# Patient Record
Sex: FEMALE | Race: WHITE | NOT HISPANIC OR LATINO | Employment: OTHER
[De-identification: names, ages, dates, MRNs, and addresses within clinical notes are randomized per-mention and may not be internally consistent; named-entity substitution may affect disease eponyms.]

---

## 2017-02-21 ENCOUNTER — PRIOR ORIGINAL RECORDS (OUTPATIENT)
Dept: OTHER | Age: 82
End: 2017-02-21

## 2017-03-01 ENCOUNTER — PRIOR ORIGINAL RECORDS (OUTPATIENT)
Dept: OTHER | Age: 82
End: 2017-03-01

## 2017-03-01 ENCOUNTER — LAB REQUISITION (OUTPATIENT)
Dept: LAB | Facility: HOSPITAL | Age: 82
End: 2017-03-01
Attending: FAMILY MEDICINE
Payer: MEDICARE

## 2017-03-01 DIAGNOSIS — I25.10 ATHEROSCLEROTIC HEART DISEASE OF NATIVE CORONARY ARTERY WITHOUT ANGINA PECTORIS: ICD-10-CM

## 2017-03-01 DIAGNOSIS — I48.91 ATRIAL FIBRILLATION (HCC): ICD-10-CM

## 2017-03-01 LAB
ALBUMIN SERPL-MCNC: 3.5 G/DL (ref 3.5–4.8)
ALP LIVER SERPL-CCNC: 55 U/L (ref 55–142)
ALT SERPL-CCNC: 24 U/L (ref 14–54)
AST SERPL-CCNC: 25 U/L (ref 15–41)
BILIRUB SERPL-MCNC: 0.8 MG/DL (ref 0.1–2)
BUN BLD-MCNC: 28 MG/DL (ref 8–20)
CALCIUM BLD-MCNC: 9.1 MG/DL (ref 8.3–10.3)
CHLORIDE: 106 MMOL/L (ref 101–111)
CHOLEST SMN-MCNC: 238 MG/DL (ref ?–200)
CO2: 29 MMOL/L (ref 22–32)
CREAT BLD-MCNC: 1.19 MG/DL (ref 0.55–1.02)
GLUCOSE BLD-MCNC: 78 MG/DL (ref 70–99)
HDLC SERPL-MCNC: 88 MG/DL (ref 45–?)
HDLC SERPL: 2.7 {RATIO} (ref ?–4.44)
LDLC SERPL CALC-MCNC: 134 MG/DL (ref ?–130)
M PROTEIN MFR SERPL ELPH: 6.7 G/DL (ref 6.1–8.3)
NONHDLC SERPL-MCNC: 150 MG/DL (ref ?–130)
POTASSIUM SERPL-SCNC: 3.9 MMOL/L (ref 3.6–5.1)
SODIUM SERPL-SCNC: 142 MMOL/L (ref 136–144)
T3 SERPL-MCNC: 51 NG/DL (ref 60–181)
THYROXINE (T4): 7.1 UG/DL (ref 4.5–10.9)
TRIGLYCERIDES: 78 MG/DL (ref ?–150)
TSI SER-ACNC: 1.67 MIU/ML (ref 0.35–5.5)
VLDL: 16 MG/DL (ref 5–40)

## 2017-03-01 PROCEDURE — 84443 ASSAY THYROID STIM HORMONE: CPT | Performed by: FAMILY MEDICINE

## 2017-03-01 PROCEDURE — 80061 LIPID PANEL: CPT | Performed by: FAMILY MEDICINE

## 2017-03-01 PROCEDURE — 84436 ASSAY OF TOTAL THYROXINE: CPT | Performed by: FAMILY MEDICINE

## 2017-03-01 PROCEDURE — 84480 ASSAY TRIIODOTHYRONINE (T3): CPT | Performed by: FAMILY MEDICINE

## 2017-03-01 PROCEDURE — 36415 COLL VENOUS BLD VENIPUNCTURE: CPT | Performed by: FAMILY MEDICINE

## 2017-03-01 PROCEDURE — 80053 COMPREHEN METABOLIC PANEL: CPT | Performed by: FAMILY MEDICINE

## 2017-03-17 LAB
ALBUMIN: 3.5 G/DL
ALT (SGPT): 24 U/L
AST (SGOT): 25 U/L
BILIRUBIN TOTAL: 0.8 MG/DL
BUN: 28 MG/DL
CALCIUM: 9.1 MG/DL
CHLORIDE: 106 MEQ/L
CHOLESTEROL, TOTAL: 238 MG/DL
CREATININE, SERUM: 1.19 MG/DL
GLUCOSE: 78 MG/DL
GLUCOSE: 78 MG/DL
HDL CHOLESTEROL: 88 MG/DL
LDL CHOLESTEROL: 134 MG/DL
POTASSIUM, SERUM: 3.9 MEQ/L
PROTEIN, TOTAL: 6.7 G/DL
SGOT (AST): 25 IU/L
SGPT (ALT): 24 IU/L
SODIUM: 142 MEQ/L
TRIGLYCERIDES: 78 MG/DL

## 2017-03-20 ENCOUNTER — PRIOR ORIGINAL RECORDS (OUTPATIENT)
Dept: OTHER | Age: 82
End: 2017-03-20

## 2017-03-21 ENCOUNTER — PRIOR ORIGINAL RECORDS (OUTPATIENT)
Dept: OTHER | Age: 82
End: 2017-03-21

## 2017-03-24 ENCOUNTER — PRIOR ORIGINAL RECORDS (OUTPATIENT)
Dept: OTHER | Age: 82
End: 2017-03-24

## 2017-03-27 ENCOUNTER — PRIOR ORIGINAL RECORDS (OUTPATIENT)
Dept: OTHER | Age: 82
End: 2017-03-27

## 2017-06-07 ENCOUNTER — LAB ENCOUNTER (OUTPATIENT)
Dept: LAB | Age: 82
End: 2017-06-07
Attending: FAMILY MEDICINE
Payer: MEDICARE

## 2017-06-07 DIAGNOSIS — I25.10 CAD (CORONARY ARTERY DISEASE): Primary | ICD-10-CM

## 2017-06-07 PROCEDURE — 36415 COLL VENOUS BLD VENIPUNCTURE: CPT

## 2017-06-07 PROCEDURE — 80048 BASIC METABOLIC PNL TOTAL CA: CPT

## 2017-06-07 PROCEDURE — 83880 ASSAY OF NATRIURETIC PEPTIDE: CPT

## 2017-07-12 ENCOUNTER — LAB ENCOUNTER (OUTPATIENT)
Dept: LAB | Age: 82
End: 2017-07-12
Attending: FAMILY MEDICINE
Payer: MEDICARE

## 2017-07-12 DIAGNOSIS — R69 DIAGNOSIS UNKNOWN: Primary | ICD-10-CM

## 2017-07-12 LAB
ALBUMIN SERPL-MCNC: 3.3 G/DL (ref 3.5–4.8)
ALP LIVER SERPL-CCNC: 55 U/L (ref 55–142)
ALT SERPL-CCNC: 21 U/L (ref 14–54)
AST SERPL-CCNC: 27 U/L (ref 15–41)
BASOPHILS # BLD AUTO: 0.05 X10(3) UL (ref 0–0.1)
BASOPHILS NFR BLD AUTO: 1.3 %
BILIRUB SERPL-MCNC: 0.7 MG/DL (ref 0.1–2)
BUN BLD-MCNC: 27 MG/DL (ref 8–20)
CALCIUM BLD-MCNC: 9.1 MG/DL (ref 8.3–10.3)
CHLORIDE: 106 MMOL/L (ref 101–111)
CO2: 33 MMOL/L (ref 22–32)
CREAT BLD-MCNC: 1.17 MG/DL (ref 0.55–1.02)
EOSINOPHIL # BLD AUTO: 0.12 X10(3) UL (ref 0–0.3)
EOSINOPHIL NFR BLD AUTO: 3.1 %
ERYTHROCYTE [DISTWIDTH] IN BLOOD BY AUTOMATED COUNT: 15 % (ref 11.5–16)
GLUCOSE BLD-MCNC: 78 MG/DL (ref 70–99)
HCT VFR BLD AUTO: 39.6 % (ref 34–50)
HGB BLD-MCNC: 12.6 G/DL (ref 12–16)
IMMATURE GRANULOCYTE COUNT: 0.01 X10(3) UL (ref 0–1)
IMMATURE GRANULOCYTE RATIO %: 0.3 %
LYMPHOCYTES # BLD AUTO: 1.13 X10(3) UL (ref 0.9–4)
LYMPHOCYTES NFR BLD AUTO: 29.6 %
M PROTEIN MFR SERPL ELPH: 6.6 G/DL (ref 6.1–8.3)
MCH RBC QN AUTO: 30.5 PG (ref 27–33.2)
MCHC RBC AUTO-ENTMCNC: 31.8 G/DL (ref 31–37)
MCV RBC AUTO: 95.9 FL (ref 81–100)
MONOCYTES # BLD AUTO: 0.6 X10(3) UL (ref 0.1–0.6)
MONOCYTES NFR BLD AUTO: 15.7 %
NEUTROPHIL ABS PRELIM: 1.91 X10 (3) UL (ref 1.3–6.7)
NEUTROPHILS # BLD AUTO: 1.91 X10(3) UL (ref 1.3–6.7)
NEUTROPHILS NFR BLD AUTO: 50 %
PLATELET # BLD AUTO: 106 10(3)UL (ref 150–450)
POTASSIUM SERPL-SCNC: 4.2 MMOL/L (ref 3.6–5.1)
RBC # BLD AUTO: 4.13 X10(6)UL (ref 3.8–5.1)
RED CELL DISTRIBUTION WIDTH-SD: 52.4 FL (ref 35.1–46.3)
SODIUM SERPL-SCNC: 143 MMOL/L (ref 136–144)
WBC # BLD AUTO: 3.8 X10(3) UL (ref 4–13)

## 2017-07-12 PROCEDURE — 80053 COMPREHEN METABOLIC PANEL: CPT

## 2017-07-12 PROCEDURE — 36415 COLL VENOUS BLD VENIPUNCTURE: CPT

## 2017-07-12 PROCEDURE — 85025 COMPLETE CBC W/AUTO DIFF WBC: CPT

## 2017-08-14 ENCOUNTER — PRIOR ORIGINAL RECORDS (OUTPATIENT)
Dept: OTHER | Age: 82
End: 2017-08-14

## 2017-08-22 ENCOUNTER — MYAURORA ACCOUNT LINK (OUTPATIENT)
Dept: OTHER | Age: 82
End: 2017-08-22

## 2017-08-22 ENCOUNTER — PRIOR ORIGINAL RECORDS (OUTPATIENT)
Dept: OTHER | Age: 82
End: 2017-08-22

## 2017-08-24 ENCOUNTER — PRIOR ORIGINAL RECORDS (OUTPATIENT)
Dept: OTHER | Age: 82
End: 2017-08-24

## 2017-09-05 ENCOUNTER — PRIOR ORIGINAL RECORDS (OUTPATIENT)
Dept: OTHER | Age: 82
End: 2017-09-05

## 2017-10-04 ENCOUNTER — LAB ENCOUNTER (OUTPATIENT)
Dept: LAB | Age: 82
End: 2017-10-04
Attending: INTERNAL MEDICINE
Payer: MEDICARE

## 2017-10-04 DIAGNOSIS — E78.00 PURE HYPERCHOLESTEROLEMIA: Primary | ICD-10-CM

## 2017-10-04 PROCEDURE — 85025 COMPLETE CBC W/AUTO DIFF WBC: CPT

## 2017-10-04 PROCEDURE — 84436 ASSAY OF TOTAL THYROXINE: CPT

## 2017-10-04 PROCEDURE — 84443 ASSAY THYROID STIM HORMONE: CPT

## 2017-10-04 PROCEDURE — 36415 COLL VENOUS BLD VENIPUNCTURE: CPT

## 2017-10-04 PROCEDURE — 80053 COMPREHEN METABOLIC PANEL: CPT

## 2017-10-04 PROCEDURE — 84480 ASSAY TRIIODOTHYRONINE (T3): CPT

## 2017-10-04 PROCEDURE — 80061 LIPID PANEL: CPT

## 2017-11-07 ENCOUNTER — PRIOR ORIGINAL RECORDS (OUTPATIENT)
Dept: OTHER | Age: 82
End: 2017-11-07

## 2017-11-14 ENCOUNTER — PRIOR ORIGINAL RECORDS (OUTPATIENT)
Dept: OTHER | Age: 82
End: 2017-11-14

## 2017-11-22 ENCOUNTER — PRIOR ORIGINAL RECORDS (OUTPATIENT)
Dept: OTHER | Age: 82
End: 2017-11-22

## 2017-12-01 ENCOUNTER — PRIOR ORIGINAL RECORDS (OUTPATIENT)
Dept: OTHER | Age: 82
End: 2017-12-01

## 2018-01-10 ENCOUNTER — NURSE ONLY (OUTPATIENT)
Dept: LAB | Age: 83
End: 2018-01-10
Attending: FAMILY MEDICINE
Payer: MEDICARE

## 2018-01-10 DIAGNOSIS — N18.30 CHRONIC KIDNEY DISEASE, STAGE III (MODERATE) (HCC): Primary | ICD-10-CM

## 2018-01-10 LAB
ALBUMIN SERPL-MCNC: 3.6 G/DL (ref 3.5–4.8)
ALP LIVER SERPL-CCNC: 68 U/L (ref 55–142)
ALT SERPL-CCNC: 22 U/L (ref 14–54)
AST SERPL-CCNC: 32 U/L (ref 15–41)
BASOPHILS # BLD AUTO: 0.05 X10(3) UL (ref 0–0.1)
BASOPHILS NFR BLD AUTO: 0.7 %
BILIRUB SERPL-MCNC: 0.8 MG/DL (ref 0.1–2)
BUN BLD-MCNC: 29 MG/DL (ref 8–20)
CALCIUM BLD-MCNC: 9.1 MG/DL (ref 8.3–10.3)
CHLORIDE: 105 MMOL/L (ref 101–111)
CO2: 31 MMOL/L (ref 22–32)
CREAT BLD-MCNC: 1.1 MG/DL (ref 0.55–1.02)
EOSINOPHIL # BLD AUTO: 0.14 X10(3) UL (ref 0–0.3)
EOSINOPHIL NFR BLD AUTO: 1.9 %
ERYTHROCYTE [DISTWIDTH] IN BLOOD BY AUTOMATED COUNT: 15.2 % (ref 11.5–16)
GLUCOSE BLD-MCNC: 80 MG/DL (ref 70–99)
HCT VFR BLD AUTO: 42 % (ref 34–50)
HGB BLD-MCNC: 13.9 G/DL (ref 12–16)
IMMATURE GRANULOCYTE COUNT: 0.04 X10(3) UL (ref 0–1)
IMMATURE GRANULOCYTE RATIO %: 0.5 %
LYMPHOCYTES # BLD AUTO: 0.94 X10(3) UL (ref 0.9–4)
LYMPHOCYTES NFR BLD AUTO: 12.7 %
M PROTEIN MFR SERPL ELPH: 7.3 G/DL (ref 6.1–8.3)
MCH RBC QN AUTO: 31.3 PG (ref 27–33.2)
MCHC RBC AUTO-ENTMCNC: 33.1 G/DL (ref 31–37)
MCV RBC AUTO: 94.6 FL (ref 81–100)
MONOCYTES # BLD AUTO: 0.64 X10(3) UL (ref 0.1–0.6)
MONOCYTES NFR BLD AUTO: 8.6 %
NEUTROPHIL ABS PRELIM: 5.6 X10 (3) UL (ref 1.3–6.7)
NEUTROPHILS # BLD AUTO: 5.6 X10(3) UL (ref 1.3–6.7)
NEUTROPHILS NFR BLD AUTO: 75.6 %
PLATELET # BLD AUTO: 131 10(3)UL (ref 150–450)
POTASSIUM SERPL-SCNC: 4.1 MMOL/L (ref 3.6–5.1)
RBC # BLD AUTO: 4.44 X10(6)UL (ref 3.8–5.1)
RED CELL DISTRIBUTION WIDTH-SD: 53.7 FL (ref 35.1–46.3)
SODIUM SERPL-SCNC: 142 MMOL/L (ref 136–144)
WBC # BLD AUTO: 7.4 X10(3) UL (ref 4–13)

## 2018-01-10 PROCEDURE — 36415 COLL VENOUS BLD VENIPUNCTURE: CPT

## 2018-01-10 PROCEDURE — 80053 COMPREHEN METABOLIC PANEL: CPT

## 2018-01-10 PROCEDURE — 85025 COMPLETE CBC W/AUTO DIFF WBC: CPT

## 2018-02-07 ENCOUNTER — NURSE ONLY (OUTPATIENT)
Dept: LAB | Age: 83
End: 2018-02-07
Attending: FAMILY MEDICINE
Payer: MEDICARE

## 2018-02-07 DIAGNOSIS — E78.5 HYPERLIPIDEMIA: Primary | ICD-10-CM

## 2018-02-07 LAB
CHOLEST SMN-MCNC: 321 MG/DL (ref ?–200)
HDLC SERPL-MCNC: 78 MG/DL (ref 45–?)
HDLC SERPL: 4.12 {RATIO} (ref ?–4.44)
LDLC SERPL CALC-MCNC: 223 MG/DL (ref ?–130)
NONHDLC SERPL-MCNC: 243 MG/DL (ref ?–130)
TRIGL SERPL-MCNC: 100 MG/DL (ref ?–150)
VLDLC SERPL CALC-MCNC: 20 MG/DL (ref 5–40)

## 2018-02-07 PROCEDURE — 80061 LIPID PANEL: CPT

## 2018-02-07 PROCEDURE — 36415 COLL VENOUS BLD VENIPUNCTURE: CPT

## 2018-03-02 PROCEDURE — 87075 CULTR BACTERIA EXCEPT BLOOD: CPT | Performed by: NURSE PRACTITIONER

## 2018-03-02 PROCEDURE — 87205 SMEAR GRAM STAIN: CPT | Performed by: NURSE PRACTITIONER

## 2018-03-02 PROCEDURE — 87070 CULTURE OTHR SPECIMN AEROBIC: CPT | Performed by: NURSE PRACTITIONER

## 2018-03-07 ENCOUNTER — OFFICE VISIT (OUTPATIENT)
Dept: WOUND CARE | Facility: HOSPITAL | Age: 83
End: 2018-03-07
Attending: NURSE PRACTITIONER
Payer: MEDICARE

## 2018-03-07 DIAGNOSIS — I87.2 PERIPHERAL VENOUS INSUFFICIENCY: ICD-10-CM

## 2018-03-07 DIAGNOSIS — L03.115 CELLULITIS OF RIGHT LOWER LIMB: ICD-10-CM

## 2018-03-07 DIAGNOSIS — S81.801A UNSPECIFIED OPEN WOUND, RIGHT LOWER LEG, INITIAL ENCOUNTER: Primary | ICD-10-CM

## 2018-03-07 PROCEDURE — 11042 DBRDMT SUBQ TIS 1ST 20SQCM/<: CPT

## 2018-03-07 PROCEDURE — 99214 OFFICE O/P EST MOD 30 MIN: CPT

## 2018-03-07 NOTE — PROGRESS NOTES
Progress Note Details  Patient Name: Rupesh Huston Date: 3/7/2018   Patient Number: [id number] Physician / Jessica Duran: Santo Gilmore   Patient YOB: 1932 [facility]    Chief Complaint  This information was obtained from Heart Disease - Mother, Maternal Grandparents, Hypertension - Mother    Social History  This information was obtained from the patient  Never smoker, Marital Status - , Alcohol Use - ocassionaly, Tobacco Use - no, Caffeine Use - ocassional-coffee, S Endocrine: Polydypsia (Excessive Thirst), Polyphagia (Excessive Hunger), Polyuria (Excessive Urination)  Hematologic/Lymphatic: Enlarged Lymph Nodes  Psychiatric: Anxiety, Depression    Additional Information  Medication reconciliation completed at today's Wound #1 Right, Medial Lower Leg is an acute Full Thickness Venous Ulcer and has received a status of Not Healed. Initial wound encounter measurements are 5.5cm length x 3.1cm width with no measurable depth, with an area of 17.05 sq cm .  No tunneling has b Extremity Color: Pigmented   Hair Growth on Extremity: No   Temperature of Extremity: Cool   Capillary Refill: < 3 seconds   Erythema: Yes   Dependent Rubor: Yes   Hyperpigmentation: Yes   Lipodermatosclerosis: No      Additional Information  RIGHT- Heel t Wound Cleansing & Dressings  May shower with protection. - Shower boot  Cleanse with saline or wound cleanser  Santyl - Place gauze moistened with saline over santyl. Bordered foam - Only in clinic today.   Dry Gauze  Kerlix  Paper tape  Change dressing ev

## 2018-03-14 ENCOUNTER — OFFICE VISIT (OUTPATIENT)
Dept: WOUND CARE | Facility: HOSPITAL | Age: 83
End: 2018-03-14
Attending: NURSE PRACTITIONER
Payer: MEDICARE

## 2018-03-14 DIAGNOSIS — L03.115 CELLULITIS OF RIGHT LOWER LIMB: ICD-10-CM

## 2018-03-14 DIAGNOSIS — S81.801A UNSPECIFIED OPEN WOUND, RIGHT LOWER LEG, INITIAL ENCOUNTER: Primary | ICD-10-CM

## 2018-03-14 DIAGNOSIS — I87.2 PERIPHERAL VENOUS INSUFFICIENCY: ICD-10-CM

## 2018-03-14 PROCEDURE — 99213 OFFICE O/P EST LOW 20 MIN: CPT

## 2018-03-14 NOTE — PROGRESS NOTES
Progress Note Details  Patient Name: Rolando Sat Date: 3/14/2018   Patient Number: [id number] Physician / Naeem Castellano: Marin Chowdary   Patient YOB: 1932 Facility: [facility]    Chief Complaint  This information was obtained from 3/14/18-Pt here for RLE wound management. Spouse able to do dressing changes as ordered. Tolerating antibiotics well and taking probiotics with it. Afebrile and denies any pain.     Complaints and Symptoms  This information was obtained from the patient  Pa BP Elevated,taking diuretics. . Pulse RRR. RR within normal limits. Afebrile. Within Ideal body weight range. Alert, calm, well developed, in no apparent distress.  Height/Length: 66 in (167.64 cm), Weight: 135 lbs (61.36 kgs), BMI: 21.8, Temperature: 97.0 ° Compression Device Used: Tubigrip or Tubifast   Calf Measurement 29 cm from heel with right measurement of 29.5 cm   Ankle Measurement 10 cm from heel with right measurement of 21 cm  Vascular Assessment:  Right Extremity Pulses:   Dorsalis Pedis: Palpable Misc/Additional Orders  Increase dietary protein  S/S of Infection    Additional Orders:    Care summary  Reviewed and evaluated labs. - 1/10/18 CBC low pl 131; CMP low gfr 46;   3/2/18-Negative wound culture.   Discussed the Plan of Care at bedside with pa

## 2018-03-21 ENCOUNTER — OFFICE VISIT (OUTPATIENT)
Dept: WOUND CARE | Facility: HOSPITAL | Age: 83
End: 2018-03-21
Attending: NURSE PRACTITIONER
Payer: MEDICARE

## 2018-03-21 DIAGNOSIS — I87.2 PERIPHERAL VENOUS INSUFFICIENCY: ICD-10-CM

## 2018-03-21 DIAGNOSIS — S81.801A UNSPECIFIED OPEN WOUND, RIGHT LOWER LEG, INITIAL ENCOUNTER: Primary | ICD-10-CM

## 2018-03-21 DIAGNOSIS — L03.115 CELLULITIS OF RIGHT LOWER LIMB: ICD-10-CM

## 2018-03-21 PROCEDURE — 99213 OFFICE O/P EST LOW 20 MIN: CPT

## 2018-03-21 NOTE — PROGRESS NOTES
Progress Note Details  Patient Name: Sarah Awan Date: 3/21/2018   Patient Number: [id number] Physician / Lisa Dorsey: Annie Benitez   Patient YOB: 1932 [facility]: [facility]    Chief Complaint  This information was obtained from 3/14/18-Pt here for RLE wound management. Spouse able to do dressing changes as ordered. Tolerating antibiotics well and taking probiotics with it. Afebrile and denies any pain. 3/21/18-Pt here for RLE wound management. Afebrile.  Pt reports mild pain that BP Elevated, taking diuretics. . Pulse RRR. RR within normal limits. Afebrile. Within Ideal body weight range. Alert, calm, well developed, in no apparent distress.  Height/Length: 66 in (167.64 cm), Weight: 135 lbs (61.36 kgs), BMI: 21.8, Temperature: 97.7 Compression Device Used: Compression Stockings   Calf Measurement 29 cm from heel with right measurement of 32 cm   Ankle Measurement 10 cm from heel with right measurement of 20.7 cm  Vascular Assessment:  Right Extremity Pulses:   Dorsalis Pedis: Palpabl Elevate leg(s)as much as possible. Follow-Up Appointments  Return Appointment in 1 week. Misc/Additional Orders  Increase dietary protein  S/S of Infection    Additional Orders:    Care summary  Reviewed and evaluated labs.  - 1/10/18 CBC low pl 131;

## 2018-03-28 ENCOUNTER — OFFICE VISIT (OUTPATIENT)
Dept: WOUND CARE | Facility: HOSPITAL | Age: 83
End: 2018-03-28
Attending: NURSE PRACTITIONER
Payer: MEDICARE

## 2018-03-28 DIAGNOSIS — L03.115 CELLULITIS OF RIGHT LOWER LIMB: ICD-10-CM

## 2018-03-28 DIAGNOSIS — S81.801A UNSPECIFIED OPEN WOUND, RIGHT LOWER LEG, INITIAL ENCOUNTER: Primary | ICD-10-CM

## 2018-03-28 DIAGNOSIS — I87.2 PERIPHERAL VENOUS INSUFFICIENCY: ICD-10-CM

## 2018-03-28 PROCEDURE — 99213 OFFICE O/P EST LOW 20 MIN: CPT

## 2018-03-28 NOTE — PROGRESS NOTES
Progress Note Details  Patient Name: Cheryl Sebastian Date: 3/28/2018   Patient Number: [id number] Physician / Lizeth Gray: Doc Degree   Patient YOB: 1932 Facility: [facility]    Chief Complaint  This information was obtained from 3/14/18-Pt here for RLE wound management. Spouse able to do dressing changes as ordered. Tolerating antibiotics well and taking probiotics with it. Afebrile and denies any pain. 3/21/18-Pt here for RLE wound management. Afebrile.  Pt reports mild pain that BP WNL. Pulse RRR. RR within normal limits. Afebrile. Within Ideal body weight range. Alert, calm, well developed, in no apparent distress.  Height/Length: 66 in (167.64 cm), Weight: 135 lbs (61.36 kgs), BMI: 21.8, Temperature: 98.0 °F (36.67 °C), Pulse: 73 Ankle Measurement 10 cm from heel with right measurement of 21.4 cm  Vascular Assessment:  Right Extremity Pulses:   Dorsalis Pedis: Palpable  Right Extremity colors, hair growth, and conditions:   Extremity Color: Pigmented   Hair Growth on Extremity: No Misc/Additional Orders  Increase dietary protein  S/S of Infection    Additional Orders:    Care summary  Reviewed and evaluated labs. - 1/10/18 CBC low pl 131; CMP low gfr 46;   3/2/18-Negative wound culture.   Discussed the Plan of Care at bedside with pa

## 2018-04-04 ENCOUNTER — OFFICE VISIT (OUTPATIENT)
Dept: WOUND CARE | Facility: HOSPITAL | Age: 83
End: 2018-04-04
Attending: NURSE PRACTITIONER
Payer: MEDICARE

## 2018-04-04 DIAGNOSIS — S81.801A UNSPECIFIED OPEN WOUND, RIGHT LOWER LEG, INITIAL ENCOUNTER: Primary | ICD-10-CM

## 2018-04-04 DIAGNOSIS — I87.2 PERIPHERAL VENOUS INSUFFICIENCY: ICD-10-CM

## 2018-04-04 DIAGNOSIS — L03.115 CELLULITIS OF RIGHT LOWER LIMB: ICD-10-CM

## 2018-04-04 PROCEDURE — 99213 OFFICE O/P EST LOW 20 MIN: CPT

## 2018-04-04 NOTE — PROGRESS NOTES
Progress Note Details  Patient Name: Rolando Maddox Date: 4/4/2018   Patient Number: [id number] Physician / Naeem Castellano: Marin Chowdary   Patient YOB: 1932 Facility: [facility]    Chief Complaint  This information was obtained from 3/14/18-Pt here for RLE wound management. Spouse able to do dressing changes as ordered. Tolerating antibiotics well and taking probiotics with it. Afebrile and denies any pain. 3/21/18-Pt here for RLE wound management. Afebrile.  Pt reports mild pain that Hematologic/Lymphatic: Enlarged Lymph Nodes  Psychiatric: Anxiety, Depression    Additional Information  Medication reconciliation completed at today's visit. : Yes        Objective    Constitutional  BP Elevated during visit only. Pulse RRR.  RR within nor Right Extremity: Edema is present   Compression Device In Use: Yes   Device Used Correctly: Yes   Compression Device Used: Compression Stockings   Calf Measurement 29 cm from heel with right measurement of 30 cm   Ankle Measurement 10 cm from heel with rig Avoid prolonged standing in one place. Elevate leg(s)as much as possible. - Above heart level for 30 mins three times a day if increased swelling and pain is noted. Follow-Up Appointments  Return Appointment in 1 week.     Misc/Additional Orders  Matilda Avery

## 2018-04-11 ENCOUNTER — OFFICE VISIT (OUTPATIENT)
Dept: WOUND CARE | Facility: HOSPITAL | Age: 83
End: 2018-04-11
Attending: NURSE PRACTITIONER
Payer: MEDICARE

## 2018-04-11 DIAGNOSIS — L03.115 CELLULITIS OF RIGHT LOWER LIMB: ICD-10-CM

## 2018-04-11 DIAGNOSIS — S81.801A UNSPECIFIED OPEN WOUND, RIGHT LOWER LEG, INITIAL ENCOUNTER: Primary | ICD-10-CM

## 2018-04-11 DIAGNOSIS — I87.2 PERIPHERAL VENOUS INSUFFICIENCY: ICD-10-CM

## 2018-04-11 PROCEDURE — 99213 OFFICE O/P EST LOW 20 MIN: CPT

## 2018-04-11 NOTE — PROGRESS NOTES
Progress Note Details  Patient Name: Mendoza Renee Date: 4/11/2018   Patient Number: [id number] Physician / Marlon Blanca: Carolina Oates   Patient YOB: 1932 Facility: [facility]    Chief Complaint  This information was obtained from 3/14/18-Pt here for RLE wound management. Spouse able to do dressing changes as ordered. Tolerating antibiotics well and taking probiotics with it. Afebrile and denies any pain. 3/21/18-Pt here for RLE wound management. Afebrile.  Pt reports mild pain that Integumentary (Hair/Skin/Nails): Dryness  Neurological: Numbness, Tingling, Tremors, Loss of Protective Sensation  Hematologic/Lymphatic: Enlarged Lymph Nodes  Psychiatric: Anxiety, Depression    Additional Information  Medication reconciliation completed Edema Assessment:  Left Extremity:   Calf Measurement 29 cm from heel   Ankle Measurement 10 cm from heel  Right Extremity: Edema is present   Compression Device In Use: Yes   Device Used Correctly: Yes   Compression Device Used: Compression Stockings   Ca Compression to Right Leg. - Own 20-30 mmhg compression, if increased discomfort noted then use 15mmhg compression stocking. Compression to Left Leg - Own compression stocking to left  Avoid prolonged standing in one place.   Elevate leg(s)as much as possi

## 2018-04-18 ENCOUNTER — OFFICE VISIT (OUTPATIENT)
Dept: WOUND CARE | Facility: HOSPITAL | Age: 83
End: 2018-04-18
Attending: NURSE PRACTITIONER
Payer: MEDICARE

## 2018-04-18 DIAGNOSIS — I87.2 PERIPHERAL VENOUS INSUFFICIENCY: ICD-10-CM

## 2018-04-18 DIAGNOSIS — S81.801A UNSPECIFIED OPEN WOUND, RIGHT LOWER LEG, INITIAL ENCOUNTER: Primary | ICD-10-CM

## 2018-04-18 DIAGNOSIS — L03.115 CELLULITIS OF RIGHT LOWER LIMB: ICD-10-CM

## 2018-04-18 PROCEDURE — 99213 OFFICE O/P EST LOW 20 MIN: CPT

## 2018-04-20 NOTE — PROGRESS NOTES
Progress Note Details  Patient Name: Briana Soto  Patient Number: [id number]  Patient YOB: 1932  Date: 4/18/2018  Physician / Aylin Ear: Monica Bahena [address]: Ammy Cardenas    Chief Complaint  This information was obtained from the 3/14/18-Pt here for RLE wound management. Spouse able to do dressing changes as ordered. Tolerating antibiotics well and taking probiotics with it. Afebrile and denies any pain. 3/21/18-Pt here for RLE wound management. Afebrile.  Pt reports mild pain Gastrointestinal (GI): Change in Bowel Habits, Nausea / Vomiting / Diarrhea (N/V/D), Difficulty Swallowing, Stomach/abdominal pain  Genitourinary (): Urinary Incontinence  Musculoskeletal: Assistive Devices  Integumentary (Hair/Skin/Nails): Dryness  Neur The periwound skin exhibited: Edema, Hemosiderosis.  The periwound skin did not exhibit: Brawny Induration, Excoriation, Induration, Crepitus, Fluctuance, Friable, Rash, Dry/Scaly, Moist, Maceration, Atrophie Kate, Cyanosis, Ecchymosis, Erythema, Pallor, Initial Anesthetic Order: Apply lidocaine to wound bed on all future wound center visits during preparation for physician exam if wound bed contains fibrin or eschar.   4% Topical Lidocaine  Dermaplast Spray    Wound Cleansing & Dressings  May shower with p Electronic Signature(s)  Signed By: Date:  Kayla GRAVES FNP-BC 04/19/2018 17:54:15       Entered By: Kayla Arias on 04/19/2018 16:52:52

## 2018-04-24 ENCOUNTER — OFFICE VISIT (OUTPATIENT)
Dept: WOUND CARE | Facility: HOSPITAL | Age: 83
End: 2018-04-24
Attending: NURSE PRACTITIONER
Payer: MEDICARE

## 2018-04-24 DIAGNOSIS — L03.115 CELLULITIS OF RIGHT LOWER LIMB: ICD-10-CM

## 2018-04-24 DIAGNOSIS — I87.2 PERIPHERAL VENOUS INSUFFICIENCY: ICD-10-CM

## 2018-04-24 DIAGNOSIS — S81.801A UNSPECIFIED OPEN WOUND, RIGHT LOWER LEG, INITIAL ENCOUNTER: Primary | ICD-10-CM

## 2018-04-24 PROCEDURE — 99214 OFFICE O/P EST MOD 30 MIN: CPT

## 2018-04-24 NOTE — PROGRESS NOTES
Progress Note Details  Patient Name: Gretta Gray Date: 4/24/2018   Patient Number: [id number] Physician / Merced Craft: Galina Medina   Patient YOB: 1932 [facility]: [facility]    Chief Complaint  This information was obtained from 3/14/18-Pt here for RLE wound management. Spouse able to do dressing changes as ordered. Tolerating antibiotics well and taking probiotics with it. Afebrile and denies any pain. 3/21/18-Pt here for RLE wound management. Afebrile.  Pt reports mild pain that Cardiovascular (Central/Peripheral): Chest Pain, Palpitations, Dyspnea on Exertion, Intermittent Claudication, Lower extremity (leg) resting pain  Gastrointestinal (GI): Change in Bowel Habits, Nausea / Vomiting / Diarrhea (N/V/D), Difficulty Swallowing, S The periwound skin exhibited: Edema, Hemosiderosis.  The periwound skin did not exhibit: Brawny Induration, Excoriation, Induration, Crepitus, Fluctuance, Friable, Rash, Dry/Scaly, Moist, Maceration, Atrophie Kate, Cyanosis, Ecchymosis, Erythema, Pallor, Temperature of Extremity: Warm   Capillary Refill: < 3 seconds   Erythema: No   Dependent Rubor: No   Hyperpigmentation: Yes   Lipodermatosclerosis: No          Wound has decreased in size and new trauma wound. No s/s of infection.   Assessment    Active Pr Wound #2 Right, Anterior Lower Leg     Topicals:  Initial Anesthetic Order: Apply lidocaine to wound bed on all future wound center visits during preparation for physician exam if wound bed contains fibrin or eschar.   4% Topical Lidocaine  Dermaplast Spray

## 2018-05-02 ENCOUNTER — OFFICE VISIT (OUTPATIENT)
Dept: WOUND CARE | Facility: HOSPITAL | Age: 83
End: 2018-05-02
Attending: NURSE PRACTITIONER
Payer: MEDICARE

## 2018-05-02 DIAGNOSIS — L03.115 CELLULITIS OF RIGHT LOWER LIMB: ICD-10-CM

## 2018-05-02 DIAGNOSIS — S81.811D LACERATION WITHOUT FOREIGN BODY, RIGHT LOWER LEG, SUBSEQUENT ENCOUNTER: ICD-10-CM

## 2018-05-02 DIAGNOSIS — I87.2 PERIPHERAL VENOUS INSUFFICIENCY: Primary | ICD-10-CM

## 2018-05-02 PROCEDURE — 97597 DBRDMT OPN WND 1ST 20 CM/<: CPT

## 2018-05-02 NOTE — PROGRESS NOTES
Progress Note Details  Patient Name: Leopoldo Leigh Date: 5/2/2018   Patient Number: [id number] Physician / Wanda Bank: Remy Pham   Patient YOB: 1932 Facility: [facility]    Chief Complaint  This information was obtained from 3/14/18-Pt here for RLE wound management. Spouse able to do dressing changes as ordered. Tolerating antibiotics well and taking probiotics with it. Afebrile and denies any pain. 3/21/18-Pt here for RLE wound management. Afebrile.  Pt reports mild pain that Respiratory: Cough, Shortness of Breath  Cardiovascular (Central/Peripheral): Chest Pain, Palpitations, Dyspnea on Exertion, Intermittent Claudication, Lower extremity (leg) resting pain  Gastrointestinal (GI): Change in Bowel Habits, Nausea / Vomiting / D Wound #2 Right, Anterior Lower Leg is an acute Full Thickness Trauma Wound and has received a status of Not Healed.  Subsequent wound encounter measurements are 1.6cm length x 1.1cm width x 0.1cm depth, with an area of 1.76 sq cm and a volume of 0.176 cubic (Encounter Diagnosis) I87.2 - Venous insufficiency (chronic) (peripheral)  (Encounter Diagnosis) L03.115 - Cellulitis of right lower limb  (Encounter Diagnosis) Z79.01 - Long term (current) use of anticoagulants  (Encounter Diagnosis) Z79.52 - Long term (c Follow-Up Appointments  Return Appointment in 1 week. Additional Orders:    Care summary  Reviewed and evaluated labs. - 1/10/18 CBC low pl 131; CMP low gfr 46;   3/2/18-Negative wound culture. Discussed the Plan of Care at bedside with patient.  The levon

## 2018-05-04 ENCOUNTER — APPOINTMENT (OUTPATIENT)
Dept: WOUND CARE | Age: 83
End: 2018-05-04
Attending: NURSE PRACTITIONER
Payer: MEDICARE

## 2018-05-09 ENCOUNTER — OFFICE VISIT (OUTPATIENT)
Dept: WOUND CARE | Facility: HOSPITAL | Age: 83
End: 2018-05-09
Attending: NURSE PRACTITIONER
Payer: MEDICARE

## 2018-05-09 DIAGNOSIS — S81.811D LACERATION WITHOUT FOREIGN BODY, RIGHT LOWER LEG, SUBSEQUENT ENCOUNTER: Primary | ICD-10-CM

## 2018-05-09 DIAGNOSIS — I87.2 PERIPHERAL VENOUS INSUFFICIENCY: ICD-10-CM

## 2018-05-09 DIAGNOSIS — L03.115 CELLULITIS OF RIGHT LOWER LIMB: ICD-10-CM

## 2018-05-09 PROCEDURE — 99213 OFFICE O/P EST LOW 20 MIN: CPT

## 2018-05-09 NOTE — PROGRESS NOTES
Progress Note Details  Patient Name: Noreen Aragon Date: 5/9/2018   Patient Number: [id number] Physician / Chavez Cap: Aníbal Ocampo   Patient YOB: 1932 Facility: [facility]    Chief Complaint  This information was obtained from 3/14/18-Pt here for RLE wound management. Spouse able to do dressing changes as ordered. Tolerating antibiotics well and taking probiotics with it. Afebrile and denies any pain. 3/21/18-Pt here for RLE wound management. Afebrile.  Pt reports mild pain that Patient denies complaints or symptoms related to:   Constitutional Symptoms (General Health):  Fatigue, Fever, Loss of Appetite, Marked Weight Change, Chills  Ear/Nose/Mouth/Throat: Dental Problems  Respiratory: Cough, Shortness of Breath  Cardiovascular Wound #2 Right, Anterior Lower Leg is an acute Full Thickness Trauma Wound and has received a status of Not Healed.  Subsequent wound encounter measurements are 1.1cm length x 0.8cm width x 0.1cm depth, with an area of 0.88 sq cm and a volume of 0.088 cubic (Encounter Diagnosis) I87.2 - Venous insufficiency (chronic) (peripheral)  (Encounter Diagnosis) L03.115 - Cellulitis of right lower limb  (Encounter Diagnosis) Z79.01 - Long term (current) use of anticoagulants  (Encounter Diagnosis) Z79.52 - Long term (c Pt here for RLE venous ulcer management. Wound progressing well. Using own compression stocking. The treatment plan is to use collagen and foam,re-assess the wound characteristics in 7 days. The overall goal is to achieve wound closure . F/U in 1 week.   Chanell

## 2018-05-16 ENCOUNTER — OFFICE VISIT (OUTPATIENT)
Dept: WOUND CARE | Facility: HOSPITAL | Age: 83
End: 2018-05-16
Attending: NURSE PRACTITIONER
Payer: MEDICARE

## 2018-05-16 DIAGNOSIS — S81.811D LACERATION WITHOUT FOREIGN BODY, RIGHT LOWER LEG, SUBSEQUENT ENCOUNTER: Primary | ICD-10-CM

## 2018-05-16 DIAGNOSIS — L03.115 CELLULITIS OF RIGHT LOWER LIMB: ICD-10-CM

## 2018-05-16 DIAGNOSIS — I87.2 PERIPHERAL VENOUS INSUFFICIENCY: ICD-10-CM

## 2018-05-16 PROCEDURE — 99213 OFFICE O/P EST LOW 20 MIN: CPT

## 2018-05-16 NOTE — PROGRESS NOTES
Progress Note Details  Patient Name: Briana Soto Date: 5/16/2018   Patient Number: [id number] Physician / Aylin Ear: Umm Murillo   Patient YOB: 1932 Facility[facility]    Chief Complaint  This information was obtained from 3/14/18-Pt here for RLE wound management. Spouse able to do dressing changes as ordered. Tolerating antibiotics well and taking probiotics with it. Afebrile and denies any pain. 3/21/18-Pt here for RLE wound management. Afebrile.  Pt reports mild pain that Hematologic/Lymphatic: Bruising (Xarelto), Bleeding Tendency (Due to xarelto)  Prior Wound History: Other (h/o venous ulcer)    Patient denies complaints or symptoms related to:   Constitutional Symptoms (General Health):  Fatigue, Fever, Loss of Appetite, Wound #2 Right, Anterior Lower Leg is an acute Full Thickness Trauma Wound and has received a status of Not Healed.  Subsequent wound encounter measurements are 0.7cm length x 0.5cm width x 0.1cm depth, with an area of 0.35 sq cm and a volume of 0.035 cubic (Encounter Diagnosis) S81.811D - Laceration without foreign body, right lower leg, subsequent encounter  (Encounter Diagnosis) I87.2 - Venous insufficiency (chronic) (peripheral)  (Encounter Diagnosis) L03.115 - Cellulitis of right lower limb  (Encounter D Last albumin date and value: - 1/10/18 3.6 and 7.3tp        Pt here for RLE venous ulcer management. Wound is progressing well and edema in control. Using own compression stocking.  The treatment plan is to use collagen and foam, re-assess the wound charact

## 2018-05-23 ENCOUNTER — OFFICE VISIT (OUTPATIENT)
Dept: WOUND CARE | Facility: HOSPITAL | Age: 83
End: 2018-05-23
Attending: NURSE PRACTITIONER
Payer: MEDICARE

## 2018-05-23 DIAGNOSIS — S81.811D LACERATION WITHOUT FOREIGN BODY, RIGHT LOWER LEG, SUBSEQUENT ENCOUNTER: Primary | ICD-10-CM

## 2018-05-23 DIAGNOSIS — L03.115 CELLULITIS OF RIGHT LOWER LIMB: ICD-10-CM

## 2018-05-23 DIAGNOSIS — I87.2 PERIPHERAL VENOUS INSUFFICIENCY: ICD-10-CM

## 2018-05-23 PROCEDURE — 99214 OFFICE O/P EST MOD 30 MIN: CPT

## 2018-05-23 NOTE — PROGRESS NOTES
Progress Note Details  Patient Name: Brutus Mcardle Date: 5/23/2018   Patient Number: [id number] Physician / Kelly Wingate: Charleen Barclay   Patient YOB: 1932 Facility: [facility]    Chief Complaint  This information was obtained from 3/14/18-Pt here for RLE wound management. Spouse able to do dressing changes as ordered. Tolerating antibiotics well and taking probiotics with it. Afebrile and denies any pain. 3/21/18-Pt here for RLE wound management. Afebrile.  Pt reports mild pain that Integumentary (Hair/Skin/Nails): Prone to Skin Tears (steroid use), Other (Bumped right knee-skin tear), Hyperpigmentation  Hematologic/Lymphatic: Bruising (Xarelto), Bleeding Tendency (Due to xarelto)  Prior Wound History: Other (h/o venous ulcer)    Noemy Wound #2 Right, Anterior Lower Leg is an acute Full Thickness Trauma Wound and has received an outcome of Resolved. Subsequent wound encounter measurements are 0cm length x 0cm width with no measurable depth, with an area of 0 sq cm .  No tunneling has been Additional Information  BP (mmHg):: 142/82        Right lower leg wound has healed. New wound on right knee, no s/s of infection.   Assessment    Active Problems    ICD-10  (Encounter Diagnosis) S81.011A - Laceration without foreign body, right knee, initia Perfusion assessed by SHIVAM/TBI - 3/7/18 bedside left and right 1.17  Perfusion assessed by palpation of pulses.  - 5/23/18 +2 bilateral DP and PT  Last sharp debridement date: - 5/2/18  Last albumin date and value: - 1/10/18 3.6 and 7.3tp        Pt here for

## 2018-05-29 ENCOUNTER — OFFICE VISIT (OUTPATIENT)
Dept: WOUND CARE | Facility: HOSPITAL | Age: 83
End: 2018-05-29
Attending: NURSE PRACTITIONER
Payer: MEDICARE

## 2018-05-29 DIAGNOSIS — S81.811D LACERATION WITHOUT FOREIGN BODY, RIGHT LOWER LEG, SUBSEQUENT ENCOUNTER: Primary | ICD-10-CM

## 2018-05-29 DIAGNOSIS — I87.2 PERIPHERAL VENOUS INSUFFICIENCY: ICD-10-CM

## 2018-05-29 PROCEDURE — 99213 OFFICE O/P EST LOW 20 MIN: CPT

## 2018-05-29 NOTE — PROGRESS NOTES
Progress Note Details  Patient Name: Eduardo Willams Date: 5/29/2018   Patient Number: [id number] Physician / Hattie Bee: Jennifer Jewell   Patient YOB: 1932 Facility: [facility]    Chief Complaint  This information was obtained from 3/14/18-Pt here for RLE wound management. Spouse able to do dressing changes as ordered. Tolerating antibiotics well and taking probiotics with it. Afebrile and denies any pain. 3/21/18-Pt here for RLE wound management. Afebrile.  Pt reports mild pain that Cardiovascular (Central/Peripheral):  Lower extremity (leg) swelling (Bilateral LE trace edema), Other (AVR, pacemaker and h/o CABG)  Integumentary (Hair/Skin/Nails): Prone to Skin Tears (steroid use), Other (Bumped right knee-skin tear), Hyperpigmentation Wound #3 Right Knee is an acute Full Thickness Trauma Wound and has received an outcome of Resolved. Subsequent wound encounter measurements are 0cm length x 0cm width with no measurable depth, with an area of 0 sq cm . No tunneling has been noted.  No sinu Wound healed. Bordered foam x 1 week. D/c'd from outpatient wound care services.   Electronic Signature(s)   Signed By:  Date:    FERNY Muñoz-BC 05/29/2018 16:37:22     Entered By: Kayla Arias on 05/29/2018 16:36:53

## 2018-06-18 ENCOUNTER — MYAURORA ACCOUNT LINK (OUTPATIENT)
Dept: OTHER | Age: 83
End: 2018-06-18

## 2018-08-24 ENCOUNTER — MYAURORA ACCOUNT LINK (OUTPATIENT)
Dept: OTHER | Age: 83
End: 2018-08-24

## 2018-11-14 ENCOUNTER — NURSE ONLY (OUTPATIENT)
Dept: LAB | Age: 83
End: 2018-11-14
Attending: FAMILY MEDICINE
Payer: MEDICARE

## 2018-11-14 DIAGNOSIS — R60.0 LEG EDEMA: Primary | ICD-10-CM

## 2018-11-14 PROCEDURE — 80053 COMPREHEN METABOLIC PANEL: CPT

## 2018-11-14 PROCEDURE — 36415 COLL VENOUS BLD VENIPUNCTURE: CPT

## 2018-12-04 ENCOUNTER — PRIOR ORIGINAL RECORDS (OUTPATIENT)
Dept: OTHER | Age: 83
End: 2018-12-04

## 2018-12-04 ENCOUNTER — MYAURORA ACCOUNT LINK (OUTPATIENT)
Dept: OTHER | Age: 83
End: 2018-12-04

## 2019-02-28 VITALS
WEIGHT: 139 LBS | SYSTOLIC BLOOD PRESSURE: 128 MMHG | HEIGHT: 67 IN | DIASTOLIC BLOOD PRESSURE: 88 MMHG | BODY MASS INDEX: 21.82 KG/M2

## 2019-02-28 VITALS
HEIGHT: 67 IN | HEART RATE: 64 BPM | BODY MASS INDEX: 22.21 KG/M2 | SYSTOLIC BLOOD PRESSURE: 136 MMHG | DIASTOLIC BLOOD PRESSURE: 80 MMHG | WEIGHT: 141.5 LBS

## 2019-02-28 VITALS
WEIGHT: 139 LBS | HEIGHT: 67 IN | DIASTOLIC BLOOD PRESSURE: 70 MMHG | SYSTOLIC BLOOD PRESSURE: 112 MMHG | BODY MASS INDEX: 21.82 KG/M2

## 2019-02-28 VITALS — SYSTOLIC BLOOD PRESSURE: 120 MMHG | DIASTOLIC BLOOD PRESSURE: 72 MMHG | HEIGHT: 67 IN

## 2019-03-01 VITALS
RESPIRATION RATE: 16 BRPM | WEIGHT: 138 LBS | SYSTOLIC BLOOD PRESSURE: 132 MMHG | HEART RATE: 66 BPM | BODY MASS INDEX: 22.18 KG/M2 | DIASTOLIC BLOOD PRESSURE: 74 MMHG | HEIGHT: 66 IN

## 2019-04-18 RX ORDER — PREDNISONE 5 MG/1
5 TABLET ORAL DAILY
COMMUNITY
Start: 2016-04-18

## 2019-04-18 RX ORDER — POTASSIUM CHLORIDE 750 MG/1
TABLET, EXTENDED RELEASE ORAL
COMMUNITY
Start: 2016-02-19 | End: 2019-08-27

## 2019-04-18 RX ORDER — FUROSEMIDE 20 MG/1
TABLET ORAL
COMMUNITY
Start: 2016-02-19 | End: 2019-08-27

## 2019-05-08 ENCOUNTER — ANCILLARY PROCEDURE (OUTPATIENT)
Dept: CARDIOLOGY | Age: 84
End: 2019-05-08
Attending: INTERNAL MEDICINE

## 2019-05-08 ENCOUNTER — ANCILLARY ORDERS (OUTPATIENT)
Dept: CARDIOLOGY | Age: 84
End: 2019-05-08

## 2019-05-08 DIAGNOSIS — Z95.0 PACEMAKER: ICD-10-CM

## 2019-05-08 PROCEDURE — 93294 REM INTERROG EVL PM/LDLS PM: CPT | Performed by: INTERNAL MEDICINE

## 2019-05-22 ENCOUNTER — NURSE ONLY (OUTPATIENT)
Dept: LAB | Age: 84
End: 2019-05-22
Attending: FAMILY MEDICINE
Payer: MEDICARE

## 2019-05-22 DIAGNOSIS — R53.83 FATIGUE: Primary | ICD-10-CM

## 2019-05-22 DIAGNOSIS — R05.9 COUGH: ICD-10-CM

## 2019-05-22 PROCEDURE — 36415 COLL VENOUS BLD VENIPUNCTURE: CPT

## 2019-05-22 PROCEDURE — 84443 ASSAY THYROID STIM HORMONE: CPT

## 2019-05-22 PROCEDURE — 80053 COMPREHEN METABOLIC PANEL: CPT

## 2019-05-22 PROCEDURE — 85025 COMPLETE CBC W/AUTO DIFF WBC: CPT

## 2019-05-23 ENCOUNTER — APPOINTMENT (OUTPATIENT)
Dept: LAB | Age: 84
End: 2019-05-23
Attending: FAMILY MEDICINE
Payer: MEDICARE

## 2019-05-23 PROCEDURE — 36415 COLL VENOUS BLD VENIPUNCTURE: CPT

## 2019-05-23 PROCEDURE — 82533 TOTAL CORTISOL: CPT

## 2019-08-27 ENCOUNTER — ANCILLARY PROCEDURE (OUTPATIENT)
Dept: CARDIOLOGY | Age: 84
End: 2019-08-27
Attending: INTERNAL MEDICINE

## 2019-08-27 VITALS — HEART RATE: 72 BPM | SYSTOLIC BLOOD PRESSURE: 110 MMHG | DIASTOLIC BLOOD PRESSURE: 78 MMHG

## 2019-08-27 DIAGNOSIS — Z95.0 CARDIAC PACEMAKER: Primary | ICD-10-CM

## 2019-08-27 DIAGNOSIS — Z45.018 PACEMAKER REPROGRAMMING/CHECK: ICD-10-CM

## 2019-08-27 PROCEDURE — 93279 PRGRMG DEV EVAL PM/LDLS PM: CPT | Performed by: INTERNAL MEDICINE

## 2019-08-27 RX ORDER — FUROSEMIDE 20 MG/1
40 TABLET ORAL DAILY
COMMUNITY
End: 2020-09-01 | Stop reason: SDUPTHER

## 2019-08-28 ENCOUNTER — TELEPHONE (OUTPATIENT)
Dept: CARDIOLOGY | Age: 84
End: 2019-08-28

## 2019-09-03 ENCOUNTER — ANCILLARY PROCEDURE (OUTPATIENT)
Dept: CARDIOLOGY | Age: 84
End: 2019-09-03
Attending: INTERNAL MEDICINE

## 2019-09-03 DIAGNOSIS — Z45.018 PACEMAKER REPROGRAMMING/CHECK: ICD-10-CM

## 2019-09-03 PROCEDURE — 93280 PM DEVICE PROGR EVAL DUAL: CPT | Performed by: INTERNAL MEDICINE

## 2019-10-03 ENCOUNTER — ANCILLARY PROCEDURE (OUTPATIENT)
Dept: CARDIOLOGY | Age: 84
End: 2019-10-03
Attending: INTERNAL MEDICINE

## 2019-10-03 DIAGNOSIS — I48.91 ATRIAL FIBRILLATION, UNSPECIFIED TYPE (CMD): ICD-10-CM

## 2019-10-03 PROCEDURE — 93306 TTE W/DOPPLER COMPLETE: CPT | Performed by: INTERNAL MEDICINE

## 2019-10-07 ENCOUNTER — TELEPHONE (OUTPATIENT)
Dept: CARDIOLOGY | Age: 84
End: 2019-10-07

## 2019-11-17 ENCOUNTER — ANCILLARY PROCEDURE (OUTPATIENT)
Dept: CARDIOLOGY | Age: 84
End: 2019-11-17
Attending: INTERNAL MEDICINE

## 2019-11-17 DIAGNOSIS — Z95.0 CARDIAC PACEMAKER IN SITU: ICD-10-CM

## 2019-11-18 PROCEDURE — 93294 REM INTERROG EVL PM/LDLS PM: CPT | Performed by: INTERNAL MEDICINE

## 2019-12-03 ENCOUNTER — OFFICE VISIT (OUTPATIENT)
Dept: CARDIOLOGY | Age: 84
End: 2019-12-03

## 2019-12-03 VITALS
HEIGHT: 66 IN | HEART RATE: 72 BPM | DIASTOLIC BLOOD PRESSURE: 62 MMHG | RESPIRATION RATE: 16 BRPM | SYSTOLIC BLOOD PRESSURE: 122 MMHG | WEIGHT: 143 LBS | BODY MASS INDEX: 22.98 KG/M2

## 2019-12-03 DIAGNOSIS — Z98.890 HISTORY OF MITRAL VALVE REPAIR: ICD-10-CM

## 2019-12-03 DIAGNOSIS — Z95.3 HISTORY OF AORTIC VALVE REPLACEMENT WITH BIOPROSTHETIC VALVE: ICD-10-CM

## 2019-12-03 DIAGNOSIS — I08.0 MILD MITRAL AND AORTIC REGURGITATION: ICD-10-CM

## 2019-12-03 DIAGNOSIS — Z98.890 S/P AV NODAL ABLATION: ICD-10-CM

## 2019-12-03 DIAGNOSIS — Z95.0 PACEMAKER: ICD-10-CM

## 2019-12-03 DIAGNOSIS — I44.2 COMPLETE HEART BLOCK (CMD): ICD-10-CM

## 2019-12-03 DIAGNOSIS — I48.20 ATRIAL FIBRILLATION, CHRONIC (CMD): Primary | ICD-10-CM

## 2019-12-03 DIAGNOSIS — Z79.01 LONG TERM (CURRENT) USE OF ANTICOAGULANTS: ICD-10-CM

## 2019-12-03 PROCEDURE — 99214 OFFICE O/P EST MOD 30 MIN: CPT | Performed by: INTERNAL MEDICINE

## 2019-12-03 SDOH — HEALTH STABILITY: PHYSICAL HEALTH: ON AVERAGE, HOW MANY DAYS PER WEEK DO YOU ENGAGE IN MODERATE TO STRENUOUS EXERCISE (LIKE A BRISK WALK)?: 7 DAYS

## 2019-12-03 ASSESSMENT — PATIENT HEALTH QUESTIONNAIRE - PHQ9
1. LITTLE INTEREST OR PLEASURE IN DOING THINGS: NOT AT ALL
SUM OF ALL RESPONSES TO PHQ9 QUESTIONS 1 AND 2: 0
SUM OF ALL RESPONSES TO PHQ9 QUESTIONS 1 AND 2: 0
2. FEELING DOWN, DEPRESSED OR HOPELESS: NOT AT ALL

## 2019-12-03 ASSESSMENT — ENCOUNTER SYMPTOMS
WEIGHT LOSS: 0
LOSS OF BALANCE: 0
BRUISES/BLEEDS EASILY: 1
SUSPICIOUS LESIONS: 0
SYNCOPE: 0
COUGH: 0
ALLERGIC/IMMUNOLOGIC COMMENTS: NO NEW FOOD ALLERGIES
HEMOPTYSIS: 0
FEVER: 0
DIZZINESS: 0
NEAR-SYNCOPE: 0
HEMATOCHEZIA: 0
WEIGHT GAIN: 0
CHILLS: 0
DOUBLE VISION: 0

## 2020-02-03 ENCOUNTER — ANCILLARY PROCEDURE (OUTPATIENT)
Dept: CARDIOLOGY | Age: 85
End: 2020-02-03
Attending: INTERNAL MEDICINE

## 2020-02-03 DIAGNOSIS — Z95.0 CARDIAC PACEMAKER IN SITU: ICD-10-CM

## 2020-02-04 PROCEDURE — 93294 REM INTERROG EVL PM/LDLS PM: CPT | Performed by: INTERNAL MEDICINE

## 2020-05-13 ENCOUNTER — ANCILLARY PROCEDURE (OUTPATIENT)
Dept: CARDIOLOGY | Age: 85
End: 2020-05-13
Attending: INTERNAL MEDICINE

## 2020-05-13 ENCOUNTER — ANCILLARY ORDERS (OUTPATIENT)
Dept: CARDIOLOGY | Age: 85
End: 2020-05-13

## 2020-05-13 DIAGNOSIS — Z95.0 CARDIAC PACEMAKER: ICD-10-CM

## 2020-05-13 PROCEDURE — X1114 CARDIAC DEVICE HOME CHECK - REMOTE UNSCHEDULED: HCPCS | Performed by: INTERNAL MEDICINE

## 2020-06-10 ENCOUNTER — NURSE ONLY (OUTPATIENT)
Dept: LAB | Age: 85
End: 2020-06-10
Attending: FAMILY MEDICINE
Payer: MEDICARE

## 2020-06-10 ENCOUNTER — APPOINTMENT (OUTPATIENT)
Dept: GENERAL RADIOLOGY | Facility: HOSPITAL | Age: 85
DRG: 480 | End: 2020-06-10
Attending: EMERGENCY MEDICINE
Payer: MEDICARE

## 2020-06-10 ENCOUNTER — HOSPITAL ENCOUNTER (INPATIENT)
Facility: HOSPITAL | Age: 85
LOS: 5 days | Discharge: NURSING, REHAB OR LONG-TERM CARE | DRG: 480 | End: 2020-06-16
Attending: EMERGENCY MEDICINE | Admitting: HOSPITALIST
Payer: MEDICARE

## 2020-06-10 DIAGNOSIS — R53.83 FATIGUE: Primary | ICD-10-CM

## 2020-06-10 DIAGNOSIS — S72.002A CLOSED FRACTURE OF LEFT HIP, INITIAL ENCOUNTER (HCC): Primary | ICD-10-CM

## 2020-06-10 DIAGNOSIS — R53.1 WEAKNESS: ICD-10-CM

## 2020-06-10 PROCEDURE — 73502 X-RAY EXAM HIP UNI 2-3 VIEWS: CPT | Performed by: EMERGENCY MEDICINE

## 2020-06-10 PROCEDURE — 84443 ASSAY THYROID STIM HORMONE: CPT

## 2020-06-10 PROCEDURE — 85025 COMPLETE CBC W/AUTO DIFF WBC: CPT

## 2020-06-10 PROCEDURE — 80053 COMPREHEN METABOLIC PANEL: CPT

## 2020-06-10 PROCEDURE — 71045 X-RAY EXAM CHEST 1 VIEW: CPT | Performed by: EMERGENCY MEDICINE

## 2020-06-10 PROCEDURE — 36415 COLL VENOUS BLD VENIPUNCTURE: CPT

## 2020-06-10 RX ORDER — MORPHINE SULFATE 4 MG/ML
4 INJECTION, SOLUTION INTRAMUSCULAR; INTRAVENOUS EVERY 30 MIN PRN
Status: DISCONTINUED | OUTPATIENT
Start: 2020-06-10 | End: 2020-06-11

## 2020-06-11 ENCOUNTER — ANESTHESIA (OUTPATIENT)
Dept: SURGERY | Facility: HOSPITAL | Age: 85
DRG: 480 | End: 2020-06-11
Payer: MEDICARE

## 2020-06-11 ENCOUNTER — APPOINTMENT (OUTPATIENT)
Dept: CT IMAGING | Facility: HOSPITAL | Age: 85
DRG: 480 | End: 2020-06-11
Attending: EMERGENCY MEDICINE
Payer: MEDICARE

## 2020-06-11 ENCOUNTER — ANESTHESIA EVENT (OUTPATIENT)
Dept: SURGERY | Facility: HOSPITAL | Age: 85
DRG: 480 | End: 2020-06-11
Payer: MEDICARE

## 2020-06-11 ENCOUNTER — APPOINTMENT (OUTPATIENT)
Dept: GENERAL RADIOLOGY | Facility: HOSPITAL | Age: 85
DRG: 480 | End: 2020-06-11
Attending: ORTHOPAEDIC SURGERY
Payer: MEDICARE

## 2020-06-11 PROBLEM — S72.002A HIP FRACTURE REQUIRING OPERATIVE REPAIR, LEFT, CLOSED, INITIAL ENCOUNTER (HCC): Status: ACTIVE | Noted: 2020-06-11

## 2020-06-11 PROBLEM — S72.002A CLOSED FRACTURE OF LEFT HIP, INITIAL ENCOUNTER (HCC): Status: ACTIVE | Noted: 2020-06-11

## 2020-06-11 PROCEDURE — 70450 CT HEAD/BRAIN W/O DYE: CPT | Performed by: EMERGENCY MEDICINE

## 2020-06-11 PROCEDURE — 3E0T3BZ INTRODUCTION OF ANESTHETIC AGENT INTO PERIPHERAL NERVES AND PLEXI, PERCUTANEOUS APPROACH: ICD-10-PCS | Performed by: ANESTHESIOLOGY

## 2020-06-11 PROCEDURE — 99223 1ST HOSP IP/OBS HIGH 75: CPT | Performed by: HOSPITALIST

## 2020-06-11 PROCEDURE — 76000 FLUOROSCOPY <1 HR PHYS/QHP: CPT | Performed by: ORTHOPAEDIC SURGERY

## 2020-06-11 PROCEDURE — 0QS736Z REPOSITION LEFT UPPER FEMUR WITH INTRAMEDULLARY INTERNAL FIXATION DEVICE, PERCUTANEOUS APPROACH: ICD-10-PCS | Performed by: ORTHOPAEDIC SURGERY

## 2020-06-11 RX ORDER — SODIUM PHOSPHATE, DIBASIC AND SODIUM PHOSPHATE, MONOBASIC 7; 19 G/133ML; G/133ML
1 ENEMA RECTAL ONCE AS NEEDED
Status: DISCONTINUED | OUTPATIENT
Start: 2020-06-11 | End: 2020-06-16

## 2020-06-11 RX ORDER — SODIUM CHLORIDE, SODIUM LACTATE, POTASSIUM CHLORIDE, CALCIUM CHLORIDE 600; 310; 30; 20 MG/100ML; MG/100ML; MG/100ML; MG/100ML
INJECTION, SOLUTION INTRAVENOUS CONTINUOUS
Status: DISCONTINUED | OUTPATIENT
Start: 2020-06-11 | End: 2020-06-11 | Stop reason: HOSPADM

## 2020-06-11 RX ORDER — ONDANSETRON 2 MG/ML
4 INJECTION INTRAMUSCULAR; INTRAVENOUS AS NEEDED
Status: DISCONTINUED | OUTPATIENT
Start: 2020-06-11 | End: 2020-06-11 | Stop reason: HOSPADM

## 2020-06-11 RX ORDER — DEXAMETHASONE SODIUM PHOSPHATE 10 MG/ML
8 INJECTION, SOLUTION INTRAMUSCULAR; INTRAVENOUS ONCE
Status: COMPLETED | OUTPATIENT
Start: 2020-06-11 | End: 2020-06-12

## 2020-06-11 RX ORDER — CEFAZOLIN SODIUM 1 G/3ML
INJECTION, POWDER, FOR SOLUTION INTRAMUSCULAR; INTRAVENOUS AS NEEDED
Status: DISCONTINUED | OUTPATIENT
Start: 2020-06-11 | End: 2020-06-11 | Stop reason: SURG

## 2020-06-11 RX ORDER — ACETAMINOPHEN 325 MG/1
650 TABLET ORAL 4 TIMES DAILY
Status: DISCONTINUED | OUTPATIENT
Start: 2020-06-11 | End: 2020-06-12

## 2020-06-11 RX ORDER — HYDROCODONE BITARTRATE AND ACETAMINOPHEN 5; 325 MG/1; MG/1
1 TABLET ORAL EVERY 4 HOURS PRN
Status: DISCONTINUED | OUTPATIENT
Start: 2020-06-11 | End: 2020-06-16

## 2020-06-11 RX ORDER — LIDOCAINE HYDROCHLORIDE 10 MG/ML
INJECTION, SOLUTION EPIDURAL; INFILTRATION; INTRACAUDAL; PERINEURAL AS NEEDED
Status: DISCONTINUED | OUTPATIENT
Start: 2020-06-11 | End: 2020-06-11 | Stop reason: SURG

## 2020-06-11 RX ORDER — MORPHINE SULFATE 4 MG/ML
1 INJECTION, SOLUTION INTRAMUSCULAR; INTRAVENOUS EVERY 2 HOUR PRN
Status: DISCONTINUED | OUTPATIENT
Start: 2020-06-11 | End: 2020-06-11 | Stop reason: ALTCHOICE

## 2020-06-11 RX ORDER — BISACODYL 10 MG
10 SUPPOSITORY, RECTAL RECTAL
Status: DISCONTINUED | OUTPATIENT
Start: 2020-06-11 | End: 2020-06-16

## 2020-06-11 RX ORDER — OXYCODONE HYDROCHLORIDE 10 MG/1
10 TABLET ORAL EVERY 4 HOURS PRN
Status: DISCONTINUED | OUTPATIENT
Start: 2020-06-11 | End: 2020-06-12

## 2020-06-11 RX ORDER — DEXAMETHASONE SODIUM PHOSPHATE 4 MG/ML
4 VIAL (ML) INJECTION AS NEEDED
Status: DISCONTINUED | OUTPATIENT
Start: 2020-06-11 | End: 2020-06-11 | Stop reason: HOSPADM

## 2020-06-11 RX ORDER — MORPHINE SULFATE 4 MG/ML
2 INJECTION, SOLUTION INTRAMUSCULAR; INTRAVENOUS EVERY 2 HOUR PRN
Status: DISCONTINUED | OUTPATIENT
Start: 2020-06-11 | End: 2020-06-11 | Stop reason: ALTCHOICE

## 2020-06-11 RX ORDER — DEXAMETHASONE SODIUM PHOSPHATE 10 MG/ML
8 INJECTION, SOLUTION INTRAMUSCULAR; INTRAVENOUS ONCE
Status: DISCONTINUED | OUTPATIENT
Start: 2020-06-12 | End: 2020-06-11

## 2020-06-11 RX ORDER — ACETAMINOPHEN 500 MG
1000 TABLET ORAL ONCE AS NEEDED
Status: DISCONTINUED | OUTPATIENT
Start: 2020-06-11 | End: 2020-06-11 | Stop reason: HOSPADM

## 2020-06-11 RX ORDER — MEPERIDINE HYDROCHLORIDE 25 MG/ML
12.5 INJECTION INTRAMUSCULAR; INTRAVENOUS; SUBCUTANEOUS AS NEEDED
Status: DISCONTINUED | OUTPATIENT
Start: 2020-06-11 | End: 2020-06-11 | Stop reason: HOSPADM

## 2020-06-11 RX ORDER — HYDROCODONE BITARTRATE AND ACETAMINOPHEN 5; 325 MG/1; MG/1
1 TABLET ORAL AS NEEDED
Status: DISCONTINUED | OUTPATIENT
Start: 2020-06-11 | End: 2020-06-11 | Stop reason: HOSPADM

## 2020-06-11 RX ORDER — HYDROMORPHONE HYDROCHLORIDE 1 MG/ML
0.4 INJECTION, SOLUTION INTRAMUSCULAR; INTRAVENOUS; SUBCUTANEOUS EVERY 2 HOUR PRN
Status: DISCONTINUED | OUTPATIENT
Start: 2020-06-11 | End: 2020-06-12

## 2020-06-11 RX ORDER — HYDROCODONE BITARTRATE AND ACETAMINOPHEN 5; 325 MG/1; MG/1
2 TABLET ORAL EVERY 4 HOURS PRN
Status: DISCONTINUED | OUTPATIENT
Start: 2020-06-11 | End: 2020-06-16

## 2020-06-11 RX ORDER — MIDAZOLAM HYDROCHLORIDE 1 MG/ML
1 INJECTION INTRAMUSCULAR; INTRAVENOUS EVERY 5 MIN PRN
Status: DISCONTINUED | OUTPATIENT
Start: 2020-06-11 | End: 2020-06-11 | Stop reason: HOSPADM

## 2020-06-11 RX ORDER — DOCUSATE SODIUM 100 MG/1
100 CAPSULE, LIQUID FILLED ORAL 2 TIMES DAILY
Status: DISCONTINUED | OUTPATIENT
Start: 2020-06-11 | End: 2020-06-16

## 2020-06-11 RX ORDER — NALOXONE HYDROCHLORIDE 0.4 MG/ML
80 INJECTION, SOLUTION INTRAMUSCULAR; INTRAVENOUS; SUBCUTANEOUS AS NEEDED
Status: DISCONTINUED | OUTPATIENT
Start: 2020-06-11 | End: 2020-06-11 | Stop reason: HOSPADM

## 2020-06-11 RX ORDER — DEXAMETHASONE SODIUM PHOSPHATE 4 MG/ML
VIAL (ML) INJECTION AS NEEDED
Status: DISCONTINUED | OUTPATIENT
Start: 2020-06-11 | End: 2020-06-11 | Stop reason: SURG

## 2020-06-11 RX ORDER — ONDANSETRON 2 MG/ML
4 INJECTION INTRAMUSCULAR; INTRAVENOUS EVERY 6 HOURS PRN
Status: DISCONTINUED | OUTPATIENT
Start: 2020-06-11 | End: 2020-06-16

## 2020-06-11 RX ORDER — ACETAMINOPHEN 325 MG/1
650 TABLET ORAL EVERY 4 HOURS PRN
Status: DISCONTINUED | OUTPATIENT
Start: 2020-06-11 | End: 2020-06-16

## 2020-06-11 RX ORDER — TIZANIDINE 2 MG/1
2 TABLET ORAL 3 TIMES DAILY PRN
Status: DISCONTINUED | OUTPATIENT
Start: 2020-06-11 | End: 2020-06-12

## 2020-06-11 RX ORDER — LABETALOL HYDROCHLORIDE 5 MG/ML
5 INJECTION, SOLUTION INTRAVENOUS EVERY 5 MIN PRN
Status: DISCONTINUED | OUTPATIENT
Start: 2020-06-11 | End: 2020-06-11 | Stop reason: HOSPADM

## 2020-06-11 RX ORDER — SODIUM CHLORIDE, SODIUM LACTATE, POTASSIUM CHLORIDE, CALCIUM CHLORIDE 600; 310; 30; 20 MG/100ML; MG/100ML; MG/100ML; MG/100ML
INJECTION, SOLUTION INTRAVENOUS CONTINUOUS PRN
Status: DISCONTINUED | OUTPATIENT
Start: 2020-06-11 | End: 2020-06-11 | Stop reason: SURG

## 2020-06-11 RX ORDER — HYDROMORPHONE HYDROCHLORIDE 1 MG/ML
0.4 INJECTION, SOLUTION INTRAMUSCULAR; INTRAVENOUS; SUBCUTANEOUS EVERY 5 MIN PRN
Status: DISCONTINUED | OUTPATIENT
Start: 2020-06-11 | End: 2020-06-11 | Stop reason: HOSPADM

## 2020-06-11 RX ORDER — HYDROMORPHONE HYDROCHLORIDE 1 MG/ML
0.2 INJECTION, SOLUTION INTRAMUSCULAR; INTRAVENOUS; SUBCUTANEOUS EVERY 2 HOUR PRN
Status: DISCONTINUED | OUTPATIENT
Start: 2020-06-11 | End: 2020-06-12

## 2020-06-11 RX ORDER — PREDNISONE 1 MG/1
5 TABLET ORAL DAILY
Status: DISCONTINUED | OUTPATIENT
Start: 2020-06-11 | End: 2020-06-12

## 2020-06-11 RX ORDER — HYDROCODONE BITARTRATE AND ACETAMINOPHEN 5; 325 MG/1; MG/1
2 TABLET ORAL AS NEEDED
Status: DISCONTINUED | OUTPATIENT
Start: 2020-06-11 | End: 2020-06-11 | Stop reason: HOSPADM

## 2020-06-11 RX ORDER — MORPHINE SULFATE 4 MG/ML
4 INJECTION, SOLUTION INTRAMUSCULAR; INTRAVENOUS EVERY 2 HOUR PRN
Status: DISCONTINUED | OUTPATIENT
Start: 2020-06-11 | End: 2020-06-11 | Stop reason: ALTCHOICE

## 2020-06-11 RX ORDER — HYDROMORPHONE HYDROCHLORIDE 1 MG/ML
0.3 INJECTION, SOLUTION INTRAMUSCULAR; INTRAVENOUS; SUBCUTANEOUS EVERY 2 HOUR PRN
Status: DISCONTINUED | OUTPATIENT
Start: 2020-06-11 | End: 2020-06-12

## 2020-06-11 RX ORDER — SODIUM CHLORIDE 9 MG/ML
INJECTION, SOLUTION INTRAVENOUS ONCE
Status: COMPLETED | OUTPATIENT
Start: 2020-06-11 | End: 2020-06-11

## 2020-06-11 RX ORDER — TRAMADOL HYDROCHLORIDE 50 MG/1
50 TABLET ORAL EVERY 6 HOURS PRN
Status: DISCONTINUED | OUTPATIENT
Start: 2020-06-11 | End: 2020-06-12

## 2020-06-11 RX ORDER — METOCLOPRAMIDE HYDROCHLORIDE 5 MG/ML
10 INJECTION INTRAMUSCULAR; INTRAVENOUS AS NEEDED
Status: DISCONTINUED | OUTPATIENT
Start: 2020-06-11 | End: 2020-06-11 | Stop reason: HOSPADM

## 2020-06-11 RX ORDER — POLYETHYLENE GLYCOL 3350 17 G/17G
17 POWDER, FOR SOLUTION ORAL DAILY PRN
Status: DISCONTINUED | OUTPATIENT
Start: 2020-06-11 | End: 2020-06-16

## 2020-06-11 RX ORDER — OXYCODONE HYDROCHLORIDE 5 MG/1
2.5 TABLET ORAL EVERY 4 HOURS PRN
Status: DISCONTINUED | OUTPATIENT
Start: 2020-06-11 | End: 2020-06-12

## 2020-06-11 RX ORDER — OXYCODONE HYDROCHLORIDE 5 MG/1
5 TABLET ORAL EVERY 4 HOURS PRN
Status: DISCONTINUED | OUTPATIENT
Start: 2020-06-11 | End: 2020-06-12

## 2020-06-11 RX ORDER — MIDAZOLAM HYDROCHLORIDE 1 MG/ML
INJECTION INTRAMUSCULAR; INTRAVENOUS AS NEEDED
Status: DISCONTINUED | OUTPATIENT
Start: 2020-06-11 | End: 2020-06-11 | Stop reason: SURG

## 2020-06-11 RX ADMIN — SODIUM CHLORIDE, SODIUM LACTATE, POTASSIUM CHLORIDE, CALCIUM CHLORIDE: 600; 310; 30; 20 INJECTION, SOLUTION INTRAVENOUS at 16:24:00

## 2020-06-11 RX ADMIN — ONDANSETRON 4 MG: 2 INJECTION INTRAMUSCULAR; INTRAVENOUS at 15:49:00

## 2020-06-11 RX ADMIN — DEXAMETHASONE SODIUM PHOSPHATE 4 MG: 4 MG/ML VIAL (ML) INJECTION at 14:35:00

## 2020-06-11 RX ADMIN — SODIUM CHLORIDE, SODIUM LACTATE, POTASSIUM CHLORIDE, CALCIUM CHLORIDE: 600; 310; 30; 20 INJECTION, SOLUTION INTRAVENOUS at 14:14:00

## 2020-06-11 RX ADMIN — SODIUM CHLORIDE, SODIUM LACTATE, POTASSIUM CHLORIDE, CALCIUM CHLORIDE: 600; 310; 30; 20 INJECTION, SOLUTION INTRAVENOUS at 15:36:00

## 2020-06-11 RX ADMIN — LIDOCAINE HYDROCHLORIDE 40 MG: 10 INJECTION, SOLUTION EPIDURAL; INFILTRATION; INTRACAUDAL; PERINEURAL at 14:25:00

## 2020-06-11 RX ADMIN — MIDAZOLAM HYDROCHLORIDE 2 MG: 1 INJECTION INTRAMUSCULAR; INTRAVENOUS at 14:12:00

## 2020-06-11 RX ADMIN — CEFAZOLIN SODIUM 2 G: 1 INJECTION, POWDER, FOR SOLUTION INTRAMUSCULAR; INTRAVENOUS at 14:35:00

## 2020-06-11 NOTE — OCCUPATIONAL THERAPY NOTE
OT order received, chart reviewed. Pt to have surgery this date. Pt will require new OT orders including recs for activity and weight bearing after surgical intervention. Will await initiation of OT until after surgery.

## 2020-06-11 NOTE — ANESTHESIA PREPROCEDURE EVALUATION
PRE-OP EVALUATION    Patient Name: Kathi Zamorano    Pre-op Diagnosis: Closed left hip fracture ([address] [facility]    Procedure(s):  LEFT INTRAMEDULLARY NAIL FOR HIP FRACTURE    Surgeon(s) and Role:     Pearle Lanes, MD - Primary    Pre-op vitals review (+) hyperlipidemia  (+) CAD    (+) CABG/stent  (+) pacemaker/AICD  (+) valvular problems/murmurs     (+) dysrhythmias and atrial fibrillation                  Endo/Other                                  Pulmonary                 (-) recent URI          N infection.     Plan/risks discussed with: patient                Present on Admission:  **None**

## 2020-06-11 NOTE — ED INITIAL ASSESSMENT (HPI)
A/O x 4. Patient from [address] [place] [address]. Patient presents with left hip pain s/p fall approx 30 minutes ago. Patient states that she was trying to reach for something and fell from the bed. Denies any head injury. Denies any LOC.   Patient on Xarelto

## 2020-06-11 NOTE — H&P
[facility]  History and Physical     Dearharrison Resendiz Patient Status:  Emergency    2/3/1932 MRN [id number]   Location [facility] Attending Claudette Slice, MD   Hosp Day # 0 PCP No primary care provider on file.      Chief C mg by mouth daily. , Disp: , Rfl:         Review of Systems:   A comprehensive 14 point review of systems was completed. Pertinent positives and negatives noted in the HPI.     Physical Exam:    /69   Pulse 70   Temp 98.7 °F (37.1 °C) (Temporal)   R afternoon  4. N.p.o.  5. Pain control  2. CAD status post CABG  1. Patient states she had normal stress test a couple of months ago  2. Able to walk multiple blocks. Never gets chest pain or shortness of breath  3.  METS greater than 4, okay from a medical

## 2020-06-11 NOTE — ANESTHESIA PROCEDURE NOTES
Airway  Date/Time: 6/11/2020 2:25 PM  Urgency: elective    Airway not difficult    General Information and Staff    Patient location during procedure: OR  Anesthesiologist: iYsel Wallace MD  Performed: anesthesiologist     Indications and Patient Conditi

## 2020-06-11 NOTE — PLAN OF CARE
NURSING ADMISSION NOTE      Patient admitted via Cart  Oriented to room. Safety precautions initiated. Bed in low position. Call light in reach. Patient is A&O x4.   Calm and cooperative. VSS. On tele-Vpaced. On RA. IV-SL.   C/o L hip pain, nor

## 2020-06-11 NOTE — OPERATIVE REPORT
Operative Note  [facility]    Patient Name: Jazmyn Antunez    Procedure Date: 2020    : 2/3/1932    MRN: [id number]    Preoperative Diagnosis: Closed left hip fracture ([facility] [address] [S72.002A]    Postoperative Diagnosis: Closed left hip fracture ([facility] [address] [S72. malleting to the appropriate depth. A small incision was made on the lateral thigh for the aiming sleeve for the femoral head screw. The guidewire for the femoral head screw was then inserted through the aiming sleeve and driven into the femoral head.

## 2020-06-11 NOTE — PROGRESS NOTES
Ortho    Consult received for 80 F with left intertrochanteric hip fracture. Plan for OR Thursday afternoon  Admit to Internal Medicine  Hold xarelto  Pain control  Please place Tsang  NPO for OR Thursday    Full consult to follow.     Fredrick Griffith MD

## 2020-06-11 NOTE — CM/SW NOTE
10am  DON Req. MSW called [facility] [phone] to confirm pt's level of care PTA. MSW spoke to Brandi who states pt is from the Independent side. 11:30am  MSW started Aidin. Message left for spouse ( phone) to discuss d/c planning.

## 2020-06-11 NOTE — CONSULTS
[facility]    Report of Consultation    Abi Horton Patient Status:  Inpatient    2/3/1932 MRN [id number]   Location [address] [facility] Attending Rodolfo Xavier MD   Hosp Day # 0 PCP No primary care provider on file.      Uma Lora tablet, 1 tablet, Oral, Q4H PRN    Or  [MAR Hold] HYDROcodone-acetaminophen (NORCO) 5-325 MG per tab 2 tablet, 2 tablet, Oral, Q4H PRN  [MAR Hold] predniSONE (DELTASONE) tab 5 mg, 5 mg, Oral, Daily  lactated ringers infusion, , Intravenous, Continuous  ace Allergies    Dabigatran              OTHER (SEE COMMENTS)    Comment:Oral, epistaxis    Review of Systems:     Constitutional:  Denies fever, chills or weight loss   Allergies: As described in allergies  HEENT:  Denies sore throat or acute eye prob  06/11/2020    K 4.6 06/11/2020     06/11/2020    CO2 26.0 06/11/2020     (H) 06/11/2020    CA 8.5 06/11/2020    ALB 3.2 (L) 06/10/2020    ALKPHO 52 (L) 06/10/2020    TP 6.9 06/10/2020    AST 24 06/10/2020    ALT 17 06/10/2020    INR 1.4

## 2020-06-11 NOTE — PHYSICAL THERAPY NOTE
PT order received, chart reviewed. Pt to have surgery this date. Pt will require new PT orders including recs for activity and weight bearing after surgical intervention. Will await initiation of PT until after surgery.

## 2020-06-11 NOTE — PROGRESS NOTES
Received pt from PACU. Pt drowsy, awakes to name. Denies pain, able to wiggle toes but weak flexion on right. Denies numbness or tingling. Pt trying to climbing out of bed, bed alarm engaged.  at bedside.

## 2020-06-11 NOTE — ED PROVIDER NOTES
Patient Seen in: [facility] Emergency Department      History   Patient presents with:  Fall    Stated Complaint:     HPI    Status post mechanical fall. Patient presents with left hip pain.   She was trying to reach for something while in bed she r Pulmonary:      Effort: Pulmonary effort is normal. No respiratory distress. Abdominal:      General: There is no distension. Palpations: Abdomen is soft. Tenderness: There is no tenderness.    Musculoskeletal:         General: Tenderness pres The following orders were created for panel order TYPE AND SCREEN.   Procedure                               Abnormality         Status                     ---------                               -----------         ------                     ABORH (BLO URINALYSIS WITH CULTURE REFLEX   TYPE AND SCREEN    Narrative: The following orders were created for panel order TYPE AND SCREEN.   Procedure                               Abnormality         Status                     --------- fall         PATIENT STATED HISTORY: (As transcribed by Technologist)  Pt fell;lt hip     pain               FINDINGS:  Comminuted and displaced intertrochanteric fracture of the     proximal left femur with varus angulation.   Surgical clips along the left

## 2020-06-12 ENCOUNTER — APPOINTMENT (OUTPATIENT)
Dept: CV DIAGNOSTICS | Facility: HOSPITAL | Age: 85
DRG: 480 | End: 2020-06-12
Attending: HOSPITALIST
Payer: MEDICARE

## 2020-06-12 ENCOUNTER — APPOINTMENT (OUTPATIENT)
Dept: GENERAL RADIOLOGY | Facility: HOSPITAL | Age: 85
DRG: 480 | End: 2020-06-12
Attending: HOSPITALIST
Payer: MEDICARE

## 2020-06-12 PROCEDURE — 93306 TTE W/DOPPLER COMPLETE: CPT | Performed by: HOSPITALIST

## 2020-06-12 PROCEDURE — 05HC33Z INSERTION OF INFUSION DEVICE INTO LEFT BASILIC VEIN, PERCUTANEOUS APPROACH: ICD-10-PCS | Performed by: HOSPITALIST

## 2020-06-12 PROCEDURE — 99233 SBSQ HOSP IP/OBS HIGH 50: CPT | Performed by: HOSPITALIST

## 2020-06-12 PROCEDURE — 71045 X-RAY EXAM CHEST 1 VIEW: CPT | Performed by: HOSPITALIST

## 2020-06-12 PROCEDURE — 99291 CRITICAL CARE FIRST HOUR: CPT | Performed by: HOSPITALIST

## 2020-06-12 PROCEDURE — 99221 1ST HOSP IP/OBS SF/LOW 40: CPT | Performed by: INTERNAL MEDICINE

## 2020-06-12 RX ORDER — HALOPERIDOL 5 MG/ML
1 INJECTION INTRAMUSCULAR EVERY 6 HOURS PRN
Status: DISCONTINUED | OUTPATIENT
Start: 2020-06-12 | End: 2020-06-16

## 2020-06-12 RX ORDER — SODIUM CHLORIDE 9 MG/ML
INJECTION, SOLUTION INTRAVENOUS ONCE
Status: COMPLETED | OUTPATIENT
Start: 2020-06-12 | End: 2020-06-12

## 2020-06-12 RX ORDER — HYDROMORPHONE HYDROCHLORIDE 1 MG/ML
0.3 INJECTION, SOLUTION INTRAMUSCULAR; INTRAVENOUS; SUBCUTANEOUS
Status: DISCONTINUED | OUTPATIENT
Start: 2020-06-12 | End: 2020-06-12

## 2020-06-12 RX ORDER — HYDROMORPHONE HYDROCHLORIDE 1 MG/ML
0.1 INJECTION, SOLUTION INTRAMUSCULAR; INTRAVENOUS; SUBCUTANEOUS
Status: DISCONTINUED | OUTPATIENT
Start: 2020-06-12 | End: 2020-06-12

## 2020-06-12 RX ORDER — HYDROMORPHONE HYDROCHLORIDE 1 MG/ML
0.2 INJECTION, SOLUTION INTRAMUSCULAR; INTRAVENOUS; SUBCUTANEOUS
Status: DISCONTINUED | OUTPATIENT
Start: 2020-06-12 | End: 2020-06-16

## 2020-06-12 RX ORDER — SODIUM CHLORIDE 9 MG/ML
INJECTION, SOLUTION INTRAVENOUS CONTINUOUS
Status: ACTIVE | OUTPATIENT
Start: 2020-06-12 | End: 2020-06-13

## 2020-06-12 RX ORDER — HYDROMORPHONE HYDROCHLORIDE 1 MG/ML
0.1 INJECTION, SOLUTION INTRAMUSCULAR; INTRAVENOUS; SUBCUTANEOUS
Status: DISCONTINUED | OUTPATIENT
Start: 2020-06-12 | End: 2020-06-16

## 2020-06-12 NOTE — CM/SW NOTE
Received call from Breanna Hurley, an  for [facility] at [address] [place] [address]. He stated that he can provide on site outpt level therapy 2-3x/week. He stated [facility] does not have a [facility], if [facility].   Anticipate pt may be mor

## 2020-06-12 NOTE — PHYSICAL THERAPY NOTE
Attempted to see for PT evaluation. Per Dr Shy Davis, hold PT at this time pending medical management. Pt hypotensive this AM.  Will re attempt when appropriate. PM: re attempt, per rn, pt remains inappropriate for PT. Will re attempt 6/13/20.

## 2020-06-12 NOTE — OCCUPATIONAL THERAPY NOTE
Attempted to see patient this am for OT evaluation. Per Dr Ken Martin, hold therapy at this time pending medical management, patient hypotensive this am. Will reattempt as able and as patient appropriate.      Second attempt in pm, per RN patient not yet appropr

## 2020-06-12 NOTE — PROGRESS NOTES
ORTHOPEDIC SURGERY PROGRESS NOTE    Attending: Delta Gutiérrez III, MD    Procedure: IM nail left hip  Date of Procedure: 6/11/2020     SUBJECTIVE:  Pain controlled.   Hypotensive over night and this AM.      OBJECTIVE:  Blood pressure 93/44, pulse 71, tempe

## 2020-06-12 NOTE — PLAN OF CARE
Pt pleasantly confused. On 2L O2 via nasal cannula. Pt [place], but hearing aids are at home. Incision to L hip with ABD pad and paper tape C/D/I. Tele monitor on. Hypotension - see previous RN note. SCDs to BLE. On cardiac diet. Tsang in place. WBAT.  Denies p

## 2020-06-12 NOTE — PROGRESS NOTES
Post Op Day 1 Ortho Note: Left IM Nail for Hip Fracture    Status Post Nerve Block:  Type of Nerve Block: Left Femoral  Single Injection Nerve Block    Post op review: No evidence of immediate block related complications, No paresthesia noted, Able to plan

## 2020-06-12 NOTE — CONSULTS
[facility] at  [facility]  Report of Consultation    Ainsley Mccray Patient Status:  Inpatient    2/3/1932 MRN [id number]   [facility] 3NW-A Attending David Domingo MD   Hosp Day # 1 PCP No primary car MG/ML injection 0.4 mg, 0.4 mg, Intravenous, Q2H PRN  •  0.9% NaCl infusion, , Intravenous, Continuous  •  ondansetron HCl (ZOFRAN) injection 4 mg, 4 mg, Intravenous, Q6H PRN  •  acetaminophen (TYLENOL) tab 650 mg, 650 mg, Oral, Q4H PRN **OR** HYDROcodone- previous ECGs available  Confirmed by Jo Otero M.D., [facility] ([id number]) on 6/11/2020 12:16:06     Labs:   Lab Results   Component Value Date    WBC 7.9 06/12/2020    HGB 8.3 06/12/2020    HCT 26.0 06/12/2020    PLT 82.0 06/12/2020    CREATSERUM 1.31 06/12/2020

## 2020-06-12 NOTE — CM/SW NOTE
06/12/20 1100   CM/SW Referral Data   Referral Source Social Work (self-referral)   Reason for Referral Discharge planning   Informant Spouse   Patient Info   Patient's Mental Status Alert;Oriented   Patient's Home Environment Senior Independent Housing

## 2020-06-12 NOTE — PROGRESS NOTES
[facility]  Progress Note     Severo Grace Patient Status:  Inpatient    2/3/1932 MRN [id number]   [facility] 3NW-A Attending Jennifer Robert MD   Hosp Day # 1 PCP No primary care provider on file.      Chief Complaint: hypotension Clearance: 28.9 mL/min (A) (based on SCr of 1.31 mg/dL (H)). Recent Labs   Lab 06/10/20  2259   PTP 17.8*   INR 1.42*     No results for input(s): TROP, CK in the last 168 hours. Imaging: Imaging data reviewed in Epic.   Medications:   • hydrocortisone on patients current state of illness, I anticipate that, after discharge, patient will require TBD.

## 2020-06-12 NOTE — ANESTHESIA POSTPROCEDURE EVALUATION
[address] [facility] Patient Status:  Inpatient   Age/Gender 80year old female MRN [id number]   [facility] 3NW-A Attending Jameson Li MD   Hosp Day # 1 PCP No primary care provider on file.        Anesthesia Post-op Note    Proc

## 2020-06-12 NOTE — PROGRESS NOTES
Dr Macho Camp paged about pt's BP retake 80/44. Awaiting response. Spoke to Dr Macho Camp. 1000cc bolus ordered. Will monitor closely.

## 2020-06-12 NOTE — PROGRESS NOTES
Post op hypotension  S/p 500 cc NS bolus  Given dose of decadron post op  Is on prednisone at home for PMR. Give another 500 cc bolus and recheck BP    Rikki Sherman MD    ADDENDUM:  Improved slightly but then dropped further.   Give another NS bolus  Has n

## 2020-06-12 NOTE — PROGRESS NOTES
Dr Suzette Choi paged about pt's BP 86/43 after 500cc fluid bolus. Pt asymptomatic. Awaiting response. Spoke with Dr Suzette Choi. 500cc fluid bolus ordered and 8mg IV prednisone ordered.

## 2020-06-12 NOTE — PLAN OF CARE
Assumed patient's care at [address] [place] [address], patient resting in bed comfortably, denies having pain at the moment , reports decreased sensation to her left hip area. Incision is with tape CDI, ice packs used as needed. VS are stable, Tsang kept in for strict I&Os.  Evla Garcia

## 2020-06-12 NOTE — PROGRESS NOTES
Able to reinsert PIV to R forearm. Pt's BP still on the low side 89/47. Dr Mackenzie Wilkins paged about possible transfer to ICU. Spoke to Dr Mackenzie Wilkins and received transfer orders and orders for vascular access. Called report to ICU nurse [address].      7745- BP roberto

## 2020-06-13 ENCOUNTER — APPOINTMENT (OUTPATIENT)
Dept: CT IMAGING | Facility: HOSPITAL | Age: 85
DRG: 480 | End: 2020-06-13
Attending: HOSPITALIST
Payer: MEDICARE

## 2020-06-13 PROCEDURE — 99233 SBSQ HOSP IP/OBS HIGH 50: CPT | Performed by: INTERNAL MEDICINE

## 2020-06-13 PROCEDURE — 30233N1 TRANSFUSION OF NONAUTOLOGOUS RED BLOOD CELLS INTO PERIPHERAL VEIN, PERCUTANEOUS APPROACH: ICD-10-PCS | Performed by: HOSPITALIST

## 2020-06-13 PROCEDURE — 99233 SBSQ HOSP IP/OBS HIGH 50: CPT | Performed by: HOSPITALIST

## 2020-06-13 PROCEDURE — 70450 CT HEAD/BRAIN W/O DYE: CPT | Performed by: HOSPITALIST

## 2020-06-13 RX ORDER — SODIUM CHLORIDE 9 MG/ML
INJECTION, SOLUTION INTRAVENOUS ONCE
Status: COMPLETED | OUTPATIENT
Start: 2020-06-13 | End: 2020-06-13

## 2020-06-13 NOTE — PLAN OF CARE
Patient receiving one unit of packed red blood cells. No signs/symptoms of transfusion reaction. Will continue to monitor.

## 2020-06-13 NOTE — OCCUPATIONAL THERAPY NOTE
OCCUPATIONAL THERAPY EVALUATION - INPATIENT     Room Number: 321/321-A  Evaluation Date: 6/13/2020  Type of Evaluation: Initial  Presenting Problem: Closed fx L hip     Physician Order: IP Consult to Occupational Therapy  Reason for Therapy: ADL/IADL Dysfu Left 6/11/2020    Performed by Jose Enrique Harris MD at [facility] MAIN OR   • [address] SITUATION  Type of Home: Independent living facility([facility])  Home Layout: One level  Lives flexion less than 1/4 ROM,  1/2 ROM    Upper extremity strength is within functional limits except for R UE shoulder flexion 2-/5, elbow flexion 2-/5, gross grasp 3/5.     COORDINATION  Gross Motor    WFL L UE, R UE impaired   Fine Motor    WFL L UE, R shifting side to side. Pt demonstrated R sided weakness in trunk control while sitting EOB. Pt noted to have asymmetrical smile with some R sided facial droop. RN notified of session.     Pt returned to supine position in bed with MAX A for boosting up to h options    Overall Complexity MODERATE     OT Discharge Recommendations: Sub-acute rehabilitation(18-21 days)  OT Device Recommendations: TBD    PLAN  OT Treatment Plan: Balance activities; Energy conservation/work simplification techniques;ADL training;IAD

## 2020-06-13 NOTE — PLAN OF CARE
Received pt in bilateral wrist restraints this am.  Remains forgetful, easily reoriented.  at bedside. Drowsy in am.   fed pt late breakfast.  At time of therapy, pt noted to have weakness to RUE.   Therapy thought pt looked to have slight r

## 2020-06-13 NOTE — PROGRESS NOTES
[facility]  Progress Note     Whitney Hudson Patient Status:  Inpatient    2/3/1932 MRN [id number]   [facility] 3NW-A Attending Donta Bran MD   Hosp Day # 2 PCP No primary care provider on file.      Chief Complaint: hypotension --  20  --    ALT 15 17  --  13  --    BILT 0.8 0.5  --  0.8  --    TP 7.1 6.9  --  5.1*  --      Estimated Creatinine Clearance: 25 mL/min (A) (based on SCr of 1.51 mg/dL (H)).   Recent Labs   Lab 06/10/20  5751   PTP 17.8*   INR 1.42*     No results for i status: Full code  · Tsang: No  Will the patient be referred to [facility]CC on discharge?: No  Estimated date of discharge: TBD  Discharge is dependent on: progrress  At this point Ms. Jazmine Be is expected to be discharge to: TBD    Whitney Coker MD

## 2020-06-13 NOTE — PROGRESS NOTES
[facility]    Electrophysiology Follow Up Note      Bon Owusu Patient Status:  Inpatient    2/3/1932 MRN [id number]   [facility] 3NW-A Attending Atiya Pollack MD   Hosp Day # 2 PCP No primary care provider on file. suspension 30 mL, 30 mL, Oral, Daily PRN  •  bisacodyl (DULCOLAX) rectal suppository 10 mg, 10 mg, Rectal, Daily PRN  •  Fleet Enema (FLEET) 7-19 GM/118ML enema 133 mL, 1 enema, Rectal, Once PRN      Physical Exam:  Blood pressure 140/52, pulse 75, tempera op  - resolved without recurrence  - TTE with normal LVEF    2) s/p bioprosthetic AVR  - normal valve function on TTE    3) ascending aortic aneurysm  - unknown chronicity  - 45 mm on TTE this admission  - outpatient surveillance    4) s/p PPM  - no acute

## 2020-06-13 NOTE — PHYSICAL THERAPY NOTE
PHYSICAL THERAPY EVALUATION - INPATIENT     Room Number: 321/321-A  Evaluation Date: 6/13/2020  Type of Evaluation: Initial  Physician Order: PT Eval and Treat    Presenting Problem: s/p left femur IM nailing 6/11/20  Reason for Therapy: Mobility Dys and ind with adls per spouse pta. Pt is right handed per spouse. SUBJECTIVE  Pt speaking minimally, only answering with single words, smiling throughout session.       Patient self-stated goal is unable to state    OBJECTIVE  Precautions: Bed/chair ala ACTIVITY TOLERANCE                         O2 WALK                  AM-PAC '6-Clicks' INPATIENT SHORT FORM - BASIC MOBILITY  How much difficulty does the patient currently have. ..  -   Turning over in bed (including adjusting bedclothes, sheets and alisha also demonstrating slight right facial droop, symmetrical tongue movement(see OT note for vision assessment). Rn notified of therapy findings. Discussed with pt and spouse POC.     Exercise/Education Provided:  Bed mobility  Posture  ROM  Transfer traini Good  Frequency (Obs): Daily  Number of Visits to Meet Established Goals: 5      CURRENT GOALS    Goal #1 Patient is able to demonstrate supine - sit EOB @ level: moderate assistance     Goal #2 Patient is able to demonstrate transfers Sit to/from Stand at

## 2020-06-13 NOTE — PLAN OF CARE
CT head showed no acute changes. Attempted to remove wrist restraint but pt still trying to remove hip dressing. Restraint reapplied. Pt ate well for dinner. No apparent distress. Repositioned frequently.  left at 6p.   Will continue to monitor

## 2020-06-13 NOTE — PLAN OF CARE
Plan of care explained and updated with patient. Patient is confused, forgetful, with reminders helpful. Short term memory loss noted. Notified physician that patient pulled out julien catheter and midline.  Patient also took off surgical dressing, which was

## 2020-06-14 ENCOUNTER — APPOINTMENT (OUTPATIENT)
Dept: GENERAL RADIOLOGY | Facility: HOSPITAL | Age: 85
DRG: 480 | End: 2020-06-14
Attending: ORTHOPAEDIC SURGERY
Payer: MEDICARE

## 2020-06-14 PROCEDURE — 73030 X-RAY EXAM OF SHOULDER: CPT | Performed by: ORTHOPAEDIC SURGERY

## 2020-06-14 PROCEDURE — 99233 SBSQ HOSP IP/OBS HIGH 50: CPT | Performed by: HOSPITALIST

## 2020-06-14 PROCEDURE — 99233 SBSQ HOSP IP/OBS HIGH 50: CPT | Performed by: INTERNAL MEDICINE

## 2020-06-14 NOTE — PLAN OF CARE
Pt a&o x2. Easily reorientated. Cheerful & cooperative w/ care. Bilat wrist restraints taken off @ 9am.   Pt not making attempts to pull @ iv or julien.  @ bedside & asst with meals.   Appetite good  Denies c/o pain  Bed alarm cont, high fall ri

## 2020-06-14 NOTE — PROGRESS NOTES
[facility]  Progress Note     Severo Grace Patient Status:  Inpatient    2/3/1932 MRN [id number]   [facility] 3NW-A Attending Jennifer Robert MD   Hosp Day # 3 PCP No primary care provider on file.      Chief Complaint: Hypotension GFRNAA 48* 49*   < > 36* 31* 41*   CA 8.9 9.1   < > 7.6* 7.9* 8.0*   ALB 3.4 3.2*  --  2.4*  --   --     140   < > 142 140 142   K 3.6 4.2   < > 4.6 4.5 4.7    107   < > 111 109 112   CO2 32.0 29.0   < > 25.0 23.0 25.0   ALKPHO 53* 52*  --  3 peripheral nerve partial paralysis. ? positioning. H/o Right shoulder trauma s/p therapy. 1. F/u right shoulder xray   2.  Consider Neuro evaluation     Appreciate consults     Quality:  · DVT Prophylaxis: SCD  · CODE status: Full code  · Tsang: No  Will t

## 2020-06-14 NOTE — PROGRESS NOTES
No major issues  Nurse tells me that patient is not able to elevate right arm. Patient does not say much.     Temp:  [97.6 °F (36.4 °C)-98 °F (36.7 °C)] 97.8 °F (36.6 °C)  Pulse:  [72-80] 72  Resp:  [18-20] 20  BP: (119-147)/(39-70) 140/53    Right shoul

## 2020-06-14 NOTE — PHYSICAL THERAPY NOTE
PHYSICAL THERAPY TREATMENT NOTE - INPATIENT    Room Number: 956/645-V     Session:2  Number of Visits to Meet Established Goals: 5    Presenting Problem: s/p left femur IM nailing 6/11/20  History related to current admission: Pt admitted due to fall at h Static Sitting: Poor  Dynamic Sitting: Poor -           Static Standing: Dependent  Dynamic Standing: Dependent    ACTIVITY TOLERANCE                         O2 WALK                    AM-PAC '6-Clicks' INPATIENT SHORT FORM - BASIC MO requiring A of 2 for most activities. Pt would continue to benefit from further PT given progress, family support, and motivation.     DISCHARGE RECOMMENDATIONS  PT Discharge Recommendations: Sub-acute rehabilitation(ELOS 18-21 days)     PLAN  PT Treatment

## 2020-06-14 NOTE — PROGRESS NOTES
[facility]    Electrophysiology Follow Up Note      Zayra Rodriguez Patient Status:  Inpatient    2/3/1932 MRN [id number]   [facility] 3NW-A Attending Jesica Bray MD   Hosp Day # 3 PCP No primary care provider on file. MG/5ML suspension 30 mL, 30 mL, Oral, Daily PRN  •  bisacodyl (DULCOLAX) rectal suppository 10 mg, 10 mg, Rectal, Daily PRN  •  Fleet Enema (FLEET) 7-19 GM/118ML enema 133 mL, 1 enema, Rectal, Once PRN      Physical Exam:  Blood pressure 147/39, pulse 73, ascending aortic aneurysm who is admitted after a fall c/b left hip fracture. She is s/p ORIF with orthopedic surgery.      1) hypotension  - was post op, resolved without recurrence  - TTE with normal LVEF     2) s/p bioprosthetic AVR  - normal valve funct

## 2020-06-14 NOTE — PLAN OF CARE
A/Ox4, VSS, , hx of pacemaker (V-paced), SCD's on, on xarelto, julien intact and draining adequately, cardiac diet, no facial droop noted, R arm weakness post op (MD aware), WBAT, PT/OT to follow, wrist restraints due to constantly picking at incision  analgesics based on type and severity of pain and evaluate response  - Implement non-pharmacological measures as appropriate and evaluate response  - Consider cultural and social influences on pain and pain management  - Manage/alleviate anxiety  - Utilize Promote increasing activity/tolerance for mobility and gait  - Educate and engage patient/family in tolerated activity level and precautions  - use of total lift for transfers, support right UE   Outcome: Progressing     Problem: Impaired Activities of Dorina Rue

## 2020-06-14 NOTE — OCCUPATIONAL THERAPY NOTE
OCCUPATIONAL THERAPY TREATMENT NOTE - INPATIENT     Room Number: 321/321-A  Session: 1   Number of Visits to Meet Established Goals: 6    Presenting Problem: Closed fx L hip     History related to current admission: Pt is 80year old female admitted on 6/1 RCNSTJ LUCY LMTD W/O BYPASS         SUBJECTIVE  \"I'm better\"    Patient self-stated goal is to sit up.     OBJECTIVE  Precautions: Bed/chair alarm    WEIGHT BEARING RESTRICTION  Weight Bearing Restriction: L lower extremity           L Lower Extremity: W management, with good verbal understanding.  present throughout. PPE worn by therapist this session: Surgical mask, gloves. Patient End of Session: Up in chair;Needs met;Call light within reach;RN aware of session/findings; All patient questions transfer to bedside commode:  with mod assist --> in progress    UE Exercise Program Goal  Patient will be supervision with right JUAN JOSE FRENCH (home exercise program).  --> in progress    Additional Goals:  Pt will tolerate sitting for 3 minutes at contact venecia

## 2020-06-15 PROCEDURE — 99233 SBSQ HOSP IP/OBS HIGH 50: CPT | Performed by: HOSPITALIST

## 2020-06-15 PROCEDURE — 99233 SBSQ HOSP IP/OBS HIGH 50: CPT | Performed by: INTERNAL MEDICINE

## 2020-06-15 RX ORDER — FUROSEMIDE 40 MG/1
40 TABLET ORAL DAILY
Status: DISCONTINUED | OUTPATIENT
Start: 2020-06-15 | End: 2020-06-16

## 2020-06-15 RX ORDER — PREDNISONE 20 MG/1
20 TABLET ORAL
Status: DISCONTINUED | OUTPATIENT
Start: 2020-06-15 | End: 2020-06-16

## 2020-06-15 NOTE — PROGRESS NOTES
[facility]  Progress Note     Alfreda Greer Patient Status:  Inpatient    2/3/1932 MRN [id number]   [facility] 3NW-A Attending Zachary Templeton MD   Hosp Day # 4 PCP No primary care provider on file.      Chief Complaint: Hypotension CA 8.9 9.1   < > 7.6* 7.9* 8.0*   ALB 3.4 3.2*  --  2.4*  --   --     140   < > 142 140 142   K 3.6 4.2   < > 4.6 4.5 4.7    107   < > 111 109 112   CO2 32.0 29.0   < > 25.0 23.0 25.0   ALKPHO 53* 52*  --  38*  --   --    AST 22 24  --  20  - cuff pathology- outpt MRI recommended     Appreciate consults   DC planning tomorrow to [facility]     Quality:  · DVT Prophylaxis: SCD  · CODE status: Full code  · Tsang: No  Will the patient be referred to TCC on discharge?: No  Estimated date of discharge:  Rosemary Frazier

## 2020-06-15 NOTE — PLAN OF CARE
Pt pleasant, cooperative, A&Ox2-3 with  at bedside. On ra,  & scds in place. Im md aware of hgb & platlet levels. Voiding via external cath, brief in place,bm today. large amount of serosanguinous drainage via surg incision x2, changed x2 today.  B

## 2020-06-15 NOTE — PLAN OF CARE
Alert X2, confused at times, requires frequent orientation, , RA, IS encouraged, no facial drooping noted upon assessment, hx of pacemaker at v-paced, SCD's on, on xareltoabril in place, LBM 06/10/20, will give suppository in AM, denies pain, refus adequate comfort level or patient's stated pain goal  Description  INTERVENTIONS:  - Encourage pt to monitor pain and request assistance  - Assess pain using appropriate pain scale  - Administer analgesics based on type and severity of pain and evaluate re Progressing     Problem: Impaired Functional Mobility  Goal: Achieve highest/safest level of mobility/gait  Description  Interventions:  - Assess patient's functional ability and stability  - Promote increasing activity/tolerance for mobility and gait  - E

## 2020-06-15 NOTE — CM/SW NOTE
10:26am  MSW spoke to pt's spouse and discussed PT rec for [facility].    DON requested, Michaela started

## 2020-06-15 NOTE — PROGRESS NOTES
[facility]    Electrophysiology Follow Up Note      Zayra Rodriguez Patient Status:  Inpatient    2/3/1932 MRN [id number]   [facility] 3NW-A Attending Jesica Bray MD   Hosp Day # 4 PCP No primary care provider on file. enema 133 mL, 1 enema, Rectal, Once PRN      Physical Exam:  Blood pressure 156/59, pulse 72, temperature 97.3 °F (36.3 °C), temperature source Oral, resp. rate 18, height 67\", weight 140 lb, SpO2 97 %.   Temp (24hrs), Av.2 °F (36.8 °C), Min:97.3 °F (3 surgery.      1) hypotension  - was post op, resolved without recurrence  - TTE with normal LVEF  - restarting home lasix, renal function is improved     2) s/p bioprosthetic AVR  - normal valve function on TTE     3) ascending aortic aneurysm  - unknown ch

## 2020-06-15 NOTE — PHYSICAL THERAPY NOTE
PHYSICAL THERAPY TREATMENT NOTE - INPATIENT    Room Number: 321/321-A     Session: 3  Number of Visits to Meet Established Goals: 5    Presenting Problem: s/p left femur IM nailing 6/11/20  History related to current admission: Pt admitted due to fall at Static Sitting: Poor  Dynamic Sitting: Poor -           Static Standing: Poor +  Dynamic Standing: Poor +    ACTIVITY TOLERANCE         Room air    AM-PAC '6-Clicks' INPATIENT SHORT FORM - BASIC MOBILITY  How much difficulty does the patient arturo Ex:   heel slides  Hip abd  SLR  AP  LAQ  X 10      Patient End of Session: Up in chair;Needs met;Call light within reach;RN aware of session/findings    ASSESSMENT   Pt progressing as demonstrated by increased activity tolerance with sitting activities

## 2020-06-16 VITALS
SYSTOLIC BLOOD PRESSURE: 165 MMHG | TEMPERATURE: 98 F | BODY MASS INDEX: 21.97 KG/M2 | DIASTOLIC BLOOD PRESSURE: 68 MMHG | RESPIRATION RATE: 16 BRPM | OXYGEN SATURATION: 97 % | WEIGHT: 140 LBS | HEIGHT: 67 IN | HEART RATE: 73 BPM

## 2020-06-16 PROCEDURE — 99232 SBSQ HOSP IP/OBS MODERATE 35: CPT | Performed by: INTERNAL MEDICINE

## 2020-06-16 PROCEDURE — 99239 HOSP IP/OBS DSCHRG MGMT >30: CPT | Performed by: HOSPITALIST

## 2020-06-16 RX ORDER — PREDNISONE 1 MG/1
5 TABLET ORAL
Status: DISCONTINUED | OUTPATIENT
Start: 2020-06-17 | End: 2020-06-16

## 2020-06-16 RX ORDER — FUROSEMIDE 20 MG/1
40 TABLET ORAL DAILY
COMMUNITY

## 2020-06-16 RX ORDER — POTASSIUM CHLORIDE 750 MG/1
20 TABLET, FILM COATED, EXTENDED RELEASE ORAL DAILY
COMMUNITY
End: 2021-10-05

## 2020-06-16 RX ORDER — HYDROCODONE BITARTRATE AND ACETAMINOPHEN 5; 325 MG/1; MG/1
1 TABLET ORAL EVERY 4 HOURS PRN
Qty: 30 TABLET | Refills: 0 | Status: ON HOLD | OUTPATIENT
Start: 2020-06-16 | End: 2021-10-05

## 2020-06-16 NOTE — PLAN OF CARE
Patient alert and oriented x 3, can be forgetful at times. Cognitive function much improved, she has awareness of what's going on. Left thigh with small coverlet dressing, a small serosanguineous drainage noted.   Denies pain at this time, verbalized head

## 2020-06-16 NOTE — PLAN OF CARE
Pt has been cooperative and calm. Denies any pain. On RA. VSS. Tolerating cardiac diet. Voiding via external purewick catheter. SCDs on. Fall precautions in place. Will continue to monitor.

## 2020-06-16 NOTE — PROGRESS NOTES
[facility]    Electrophysiology Follow Up Note      Alfreda Greer Patient Status:  Inpatient    2/3/1932 MRN [id number]   [facility] 3SW-A Attending Zachary Templeton MD   Hosp Day # 5 PCP No primary care provider on file. Enema (FLEET) 7-19 GM/118ML enema 133 mL, 1 enema, Rectal, Once PRN      Physical Exam:  Blood pressure 139/70, pulse 76, temperature 97.2 °F (36.2 °C), temperature source Oral, resp. rate 16, height 67\", weight 140 lb, SpO2 100 %. Temp (24hrs), Av. 2 ascending aortic aneurysm  - unknown chronicity  - 45 mm on TTE this admission  - continue outpatient surveillance     4) s/p PPM  - no acute issues     5) persistent AF  - ventricular pacing, no rate control needed  - on rivaroxaban 15 mg daily; Hb is sta

## 2020-06-16 NOTE — PROGRESS NOTES
ORTHOPEDIC SURGERY PROGRESS NOTE    Attending: Chas Reese III, MD    Procedure: IM nail left hip  Date of Procedure: 6/11/2020     SUBJECTIVE:  Pain controlled. More alert today. Comfortable in bed. Up to chair yesterday. Has not been up yet today. papo Sharma III, MD

## 2020-06-16 NOTE — PROGRESS NOTES
[facility]  Progress Note     Abi Horton Patient Status:  Inpatient    2/3/1932 MRN [id number]   [facility] 3NW-A Attending Rodolfo Xavier MD   Hosp Day # 5 PCP No primary care provider on file.      Chief Complaint: Hypotension < > 7.6* 7.9* 8.0*   ALB 3.4 3.2*  --  2.4*  --   --     140   < > 142 140 142   K 3.6 4.2   < > 4.6 4.5 4.7    107   < > 111 109 112   CO2 32.0 29.0   < > 25.0 23.0 25.0   ALKPHO 53* 52*  --  38*  --   --    AST 22 24  --  20  --   --    ALT 1 Appreciate consults   DC planning to [facility] once arranged  Medically cleared for discharge     Quality:   · DVT Prophylaxis: SCD, Xarelto   · CODE status: Full code  · Tsang: No  Will the patient be referred to TCC on discharge?: No  Estimated date of dis

## 2020-06-16 NOTE — PHYSICAL THERAPY NOTE
PHYSICAL THERAPY TREATMENT NOTE - INPATIENT    Room Number: 321/321-A     Session: 4  Number of Visits to Meet Established Goals: 5    Presenting Problem: s/p left femur IM nailing 6/11/20  History related to current admission: Pt admitted due to fall at Static Sitting: Poor  Dynamic Sitting: Poor -           Static Standing: Poor +  Dynamic Standing: Poor +    ACTIVITY TOLERANCE         Room air    AM-PAC '6-Clicks' INPATIENT SHORT FORM - BASIC MOBILITY  How much difficulty does the patient arturo take side steps. pt presented with weakness or right UE, lob to right and post in sitting, dec strength,impaired standing balance, dec trunk strength and dec endurance. During entire session, noted to have weakness of R ue, dec coordination and strength.  R

## 2020-06-16 NOTE — CM/SW NOTE
Met with pt and pt's  who stated preference for [facility]. They are agreeable with DC plan for today. Spoke with Carmen Loaiza at [facility] who confirmed pt can be accepted for admission. Ambulance transport requested for 4pm today.   PCS form completed and

## 2020-06-16 NOTE — PROGRESS NOTES
NURSING DISCHARGE NOTE    Discharged Nursing home via Ambulance. Accompanied by Support staff  Belongings Taken by patient/family.

## 2020-06-17 ENCOUNTER — INITIAL APN SNF VISIT (OUTPATIENT)
Dept: INTERNAL MEDICINE CLINIC | Age: 85
End: 2020-06-17

## 2020-06-17 VITALS
HEART RATE: 73 BPM | OXYGEN SATURATION: 98 % | DIASTOLIC BLOOD PRESSURE: 54 MMHG | TEMPERATURE: 98 F | RESPIRATION RATE: 18 BRPM | SYSTOLIC BLOOD PRESSURE: 150 MMHG

## 2020-06-17 DIAGNOSIS — M35.3 PMR (POLYMYALGIA RHEUMATICA) (HCC): ICD-10-CM

## 2020-06-17 DIAGNOSIS — Z91.89 AT HIGH RISK FOR CONSTIPATION: ICD-10-CM

## 2020-06-17 DIAGNOSIS — Z87.81 HISTORY OF FEMUR FRACTURE: Primary | ICD-10-CM

## 2020-06-17 DIAGNOSIS — D62 ACUTE BLOOD LOSS ANEMIA: ICD-10-CM

## 2020-06-17 DIAGNOSIS — Z79.899 MEDICATION MANAGEMENT: ICD-10-CM

## 2020-06-17 PROCEDURE — 99310 SBSQ NF CARE HIGH MDM 45: CPT | Performed by: NURSE PRACTITIONER

## 2020-06-17 PROCEDURE — 1111F DSCHRG MED/CURRENT MED MERGE: CPT | Performed by: NURSE PRACTITIONER

## 2020-06-17 RX ORDER — LIDOCAINE 50 MG/G
1 PATCH TOPICAL EVERY 24 HOURS
COMMUNITY

## 2020-06-17 RX ORDER — DOCUSATE SODIUM 100 MG/1
100 CAPSULE, LIQUID FILLED ORAL 2 TIMES DAILY
COMMUNITY

## 2020-06-17 RX ORDER — POLYETHYLENE GLYCOL 3350 17 G/17G
17 POWDER, FOR SOLUTION ORAL DAILY PRN
COMMUNITY

## 2020-06-17 NOTE — DISCHARGE SUMMARY
[facility]  DISCHARGE SUMMARY     Meagan Marlow Patient Status:  Inpatient    2/3/1932 MRN [id number]   [facility] 3SW-A Attending Daniel Prescott MD   Hosp Day # 5 PCP No primary care provider on file.      Date of Admission: 6/10/2020 Risk of readmission: Amna Alvarado has High Risk of readmission after discharge from the hospital.    History of Present Illness: Amna Alvarado is a 80year old female with past medical history of CAD status post cabg, A. fib status on Xarelto, degree AV b tablet  Refills:  0        CHANGE how you take these medications      Instructions Prescription details   predniSONE 5 MG Tabs  Commonly known as:  Lien Rossi  What changed:  Another medication with the same name was removed.  Continue taking this medication

## 2020-06-17 NOTE — PROGRESS NOTES
Chiquita Segundo  : 2/3/1932  Age 80year old  female patient is admitted to Facility: [address] [facility] for [address] [place] [address] date:  6/10/20  Discharge date to [facility]:  20  ELOS:  16-19 days  Anticipated discharge date:  20; sooner consulted  2. S/P Left intramedullary nailing on 6/11  3. Cont xarelto and hgb stable.    2. Hypotension- resolved. Now HTN   1. Home lasix   3. Elevated proBNP with hypotension  1. ECHO reviewed  2. Cardiology Consult appreciated   3. Cont home lasix   4. Smokeless tobacco: Never Used    Alcohol use: Yes      Comment: 1 glass of wine a night.     Drug use: Never      ALLERGIES:    Dabigatran              OTHER (SEE COMMENTS)    Comment:Oral, epistaxis    CODE STATUS:  Full Code    ADVANCED CARE PLANNING TEAM allergies    PHYSICAL EXAM:  GENERAL HEALTH: well developed, well nourished, in no apparent distress  LINES, TUBES, DRAINS:  none  SKIN: no rashes, no suspicious lesions  WOUND: left hip; 2 surgical sites examined; staples intact however serosanguinous shannon EOPERCENT 2.0 06/16/2020    BAPERCENT 0.1 06/16/2020    NE 6.20 06/16/2020    LYMABS 1.22 06/16/2020    MOABSO 0.97 06/16/2020    EOABSO 0.17 06/16/2020    BAABSO 0.01 06/16/2020       [facility] records, notes, lab and imaging results reviewed.   Medic recommended.     Isis Fitzpatrick, CHRISTOPHE  06/17/20

## 2020-06-17 NOTE — CM/SW NOTE
06/16/20 1410   Discharge disposition   Expected discharge disposition Skilled Nurs   Name of [id number] [address]   Discharge transportation [facility] 6/16/2020

## 2020-06-18 ENCOUNTER — EXTERNAL FACILITY (OUTPATIENT)
Dept: FAMILY MEDICINE CLINIC | Facility: CLINIC | Age: 85
End: 2020-06-18

## 2020-06-18 DIAGNOSIS — S72.002S CLOSED FRACTURE OF LEFT HIP, SEQUELA: ICD-10-CM

## 2020-06-18 DIAGNOSIS — I10 ESSENTIAL HYPERTENSION: ICD-10-CM

## 2020-06-18 DIAGNOSIS — I44.2 AV BLOCK, 3RD DEGREE (HCC): ICD-10-CM

## 2020-06-18 DIAGNOSIS — I48.20 CHRONIC ATRIAL FIBRILLATION (HCC): ICD-10-CM

## 2020-06-18 DIAGNOSIS — R29.898 RIGHT ARM WEAKNESS: ICD-10-CM

## 2020-06-18 DIAGNOSIS — N18.30 STAGE 3 CHRONIC KIDNEY DISEASE (HCC): ICD-10-CM

## 2020-06-18 DIAGNOSIS — D62 ACUTE BLOOD LOSS ANEMIA: ICD-10-CM

## 2020-06-18 DIAGNOSIS — R53.1 WEAKNESS: Primary | ICD-10-CM

## 2020-06-18 DIAGNOSIS — M35.3 PMR (POLYMYALGIA RHEUMATICA) (HCC): ICD-10-CM

## 2020-06-18 PROCEDURE — 99306 1ST NF CARE HIGH MDM 50: CPT | Performed by: FAMILY MEDICINE

## 2020-06-19 ENCOUNTER — SNF VISIT (OUTPATIENT)
Dept: INTERNAL MEDICINE CLINIC | Age: 85
End: 2020-06-19

## 2020-06-19 DIAGNOSIS — S72.002D HIP FRACTURE REQUIRING OPERATIVE REPAIR, LEFT, CLOSED, WITH ROUTINE HEALING, SUBSEQUENT ENCOUNTER: Primary | ICD-10-CM

## 2020-06-19 DIAGNOSIS — Z78.9 PRESENCE OF SURGICAL INCISION: ICD-10-CM

## 2020-06-19 DIAGNOSIS — M35.3 PMR (POLYMYALGIA RHEUMATICA) (HCC): ICD-10-CM

## 2020-06-19 DIAGNOSIS — I48.91 ATRIAL FIBRILLATION, UNSPECIFIED TYPE (HCC): ICD-10-CM

## 2020-06-19 PROCEDURE — 99308 SBSQ NF CARE LOW MDM 20: CPT | Performed by: NURSE PRACTITIONER

## 2020-06-20 NOTE — PROGRESS NOTES
Yeison Hui, 33/1932, 80year old, female    Chief Complaint:  Patient presents with:   Follow - Up  Musculoskeletal Problem  Wound  Medication Follow-Up     [facility] Admit date:  6/10/20  Discharge date to [facility]:  6/16/20  ELOS:  16-19 days  Anticipa treat  DAVID team to establish care plan meeting with patient and POA/family as appropriate  DAVID team/ & discharge planner to assist with establishing safe discharge plan for next level of care     DVT Prophylaxis   Xarelto per Ortho     Left fe

## 2020-06-22 ENCOUNTER — SNF VISIT (OUTPATIENT)
Dept: INTERNAL MEDICINE CLINIC | Age: 85
End: 2020-06-22

## 2020-06-22 VITALS
HEART RATE: 75 BPM | OXYGEN SATURATION: 99 % | RESPIRATION RATE: 18 BRPM | DIASTOLIC BLOOD PRESSURE: 67 MMHG | TEMPERATURE: 98 F | SYSTOLIC BLOOD PRESSURE: 143 MMHG

## 2020-06-22 DIAGNOSIS — Z79.899 MEDICATION MANAGEMENT: ICD-10-CM

## 2020-06-22 DIAGNOSIS — S72.002D HIP FRACTURE REQUIRING OPERATIVE REPAIR, LEFT, CLOSED, WITH ROUTINE HEALING, SUBSEQUENT ENCOUNTER: Primary | ICD-10-CM

## 2020-06-22 DIAGNOSIS — Z78.9 PRESENCE OF SURGICAL INCISION: ICD-10-CM

## 2020-06-22 PROCEDURE — 99308 SBSQ NF CARE LOW MDM 20: CPT | Performed by: NURSE PRACTITIONER

## 2020-06-22 NOTE — PROGRESS NOTES
Patrice Chi, 33/1932, 80year old, female    Chief Complaint:  Patient presents with:   Follow - Up  Musculoskeletal Problem  Constipation     [facility] Admit date:  6/10/20  Discharge date to [facility]:  6/16/20  ELOS:  16-19 days  Anticipated discharge d and POA/family as appropriate  DAVID team/ & discharge planner to assist with establishing safe discharge plan for next level of care     DVT Prophylaxis   Xarelto per Ortho     Left femur displaced fracture; s/p IM nailing 6/11/20  PT/OT eval a

## 2020-06-26 ENCOUNTER — SNF VISIT (OUTPATIENT)
Dept: INTERNAL MEDICINE CLINIC | Age: 85
End: 2020-06-26

## 2020-06-26 VITALS
SYSTOLIC BLOOD PRESSURE: 144 MMHG | DIASTOLIC BLOOD PRESSURE: 56 MMHG | OXYGEN SATURATION: 94 % | RESPIRATION RATE: 16 BRPM | HEART RATE: 67 BPM | TEMPERATURE: 97 F

## 2020-06-26 DIAGNOSIS — S72.002D HIP FRACTURE REQUIRING OPERATIVE REPAIR, LEFT, CLOSED, WITH ROUTINE HEALING, SUBSEQUENT ENCOUNTER: ICD-10-CM

## 2020-06-26 DIAGNOSIS — Z74.3 REQUIRES CONTINUOUS SUPERVISION FOR ACTIVITIES OF DAILY LIVING (ADL): Primary | ICD-10-CM

## 2020-06-26 DIAGNOSIS — Z78.9 PRESENCE OF SURGICAL INCISION: ICD-10-CM

## 2020-06-26 PROCEDURE — 99308 SBSQ NF CARE LOW MDM 20: CPT | Performed by: NURSE PRACTITIONER

## 2020-06-26 NOTE — PROGRESS NOTES
Julio Stratton, 33/1932, 80year old, female    Chief Complaint:  Patient presents with:   Follow - Up  Musculoskeletal Problem  Weakness     [facility] Admit date:  6/10/20  Discharge date to [facility]:  6/16/20  ELOS:  16-19 days  Anticipated discharge date: POA/family as appropriate  DAVID team/ & discharge planner to assist with establishing safe discharge plan for next level of care     DVT Prophylaxis   Xarelto per Ortho     Left femur displaced fracture; s/p IM nailing 6/11/20  PT/OT eval and t

## 2020-06-27 ENCOUNTER — EXTERNAL FACILITY (OUTPATIENT)
Dept: FAMILY MEDICINE CLINIC | Facility: CLINIC | Age: 85
End: 2020-06-27

## 2020-06-27 DIAGNOSIS — I10 ESSENTIAL HYPERTENSION: ICD-10-CM

## 2020-06-27 DIAGNOSIS — R29.898 RIGHT ARM WEAKNESS: ICD-10-CM

## 2020-06-27 DIAGNOSIS — I48.20 CHRONIC ATRIAL FIBRILLATION (HCC): ICD-10-CM

## 2020-06-27 DIAGNOSIS — R53.1 WEAKNESS: Primary | ICD-10-CM

## 2020-06-27 DIAGNOSIS — S72.002A CLOSED FRACTURE OF LEFT HIP, INITIAL ENCOUNTER (HCC): ICD-10-CM

## 2020-06-27 DIAGNOSIS — D62 ACUTE BLOOD LOSS ANEMIA: ICD-10-CM

## 2020-06-27 DIAGNOSIS — I44.2 AV BLOCK, 3RD DEGREE (HCC): ICD-10-CM

## 2020-06-27 DIAGNOSIS — M35.3 PMR (POLYMYALGIA RHEUMATICA) (HCC): ICD-10-CM

## 2020-06-27 PROCEDURE — 99308 SBSQ NF CARE LOW MDM 20: CPT | Performed by: FAMILY MEDICINE

## 2020-06-28 VITALS
RESPIRATION RATE: 18 BRPM | TEMPERATURE: 97 F | DIASTOLIC BLOOD PRESSURE: 59 MMHG | BODY MASS INDEX: 23 KG/M2 | OXYGEN SATURATION: 98 % | HEART RATE: 73 BPM | SYSTOLIC BLOOD PRESSURE: 118 MMHG | WEIGHT: 147.31 LBS

## 2020-06-28 VITALS
RESPIRATION RATE: 18 BRPM | SYSTOLIC BLOOD PRESSURE: 130 MMHG | TEMPERATURE: 98 F | DIASTOLIC BLOOD PRESSURE: 62 MMHG | HEART RATE: 72 BPM | OXYGEN SATURATION: 96 % | WEIGHT: 147.13 LBS | BODY MASS INDEX: 23 KG/M2

## 2020-06-28 PROBLEM — N18.30 STAGE 3 CHRONIC KIDNEY DISEASE (HCC): Status: ACTIVE | Noted: 2020-06-28

## 2020-06-28 NOTE — PROGRESS NOTES
Amna Alvarado  : 2/3/1932  Age 80year old  female [facility]     Chief complaint: fall, left femur fx, weakness, anemia, CKDIII    From hospital records:   Meme Peterson is a 80year old ABLATION & RCNSTJ ATRIA LMTD W/O BYPASS       No family history on file. Social History    Tobacco Use      Smoking status: Never Smoker      Smokeless tobacco: Never Used    Alcohol use: Yes      Comment: 1 glass of wine a night.     Drug use: Never anxiety    PHYSICAL EXAM:  GENERAL HEALTH: well developed, well nourished, in no apparent distress  LINES, TUBES, DRAINS:  none  SKIN: no rashes, no suspicious lesions  WOUND: healing incision site.  + bruising and swelling over left hip.   EYES: PERRLA, EO

## 2020-06-28 NOTE — PROGRESS NOTES
Patrice Chi  : 2/3/1932  Age 80year old  female [facility]     Chief complaint: fall, left femur fx, weakness, anemia, CKDIII    From hospital records:   Uriel Peterson is a 80year old Pacemaker      Past Surgical History:   Procedure Laterality Date   • CABG     • CARDIAC PACEMAKER PLACEMENT     • FEMUR IM NAIL Left 6/11/2020    Performed by eVra Espana MD at [facility] MAIN OR   • [address] W/O BYPASS       No f frequency; no vaginal discharge; no urinary incontinence; no hematuria  MUSCULOSKELETAL:no joint complaints upper or lower extremities  NEURO:no sensory or motor complaint  PSYCHE: no symptoms of depression or anxiety    PHYSICAL EXAM:  GENERAL HEALTH: wel hypertension  Stable. On lasix 40mg daily. 8. AV block, 3rd degree (HCC)  Has pace maker    9. Right arm weakness  F/u ortho outpt possible need MRI outpt due to rotator cuff injury.     Jimi Noss, DO

## 2020-07-06 ENCOUNTER — SNF DISCHARGE (OUTPATIENT)
Dept: INTERNAL MEDICINE CLINIC | Age: 85
End: 2020-07-06

## 2020-07-06 DIAGNOSIS — Z79.899 MEDICATION MANAGEMENT: ICD-10-CM

## 2020-07-06 DIAGNOSIS — Z76.0 PRESCRIPTION REFILL: ICD-10-CM

## 2020-07-06 DIAGNOSIS — S72.002S CLOSED FRACTURE OF LEFT HIP, SEQUELA: Primary | ICD-10-CM

## 2020-07-06 PROCEDURE — 99316 NF DSCHRG MGMT 30 MIN+: CPT | Performed by: NURSE PRACTITIONER

## 2020-07-07 VITALS
HEART RATE: 68 BPM | SYSTOLIC BLOOD PRESSURE: 118 MMHG | TEMPERATURE: 97 F | DIASTOLIC BLOOD PRESSURE: 70 MMHG | RESPIRATION RATE: 18 BRPM | OXYGEN SATURATION: 96 %

## 2020-07-07 NOTE — PROGRESS NOTES
Gino Nisha, 33/1932, 80year old, female is being discharged from Facility: [address] [place] [address]    Date of Admission: 6/16/20  Date of Discharge: 7/6/20                              Admitting Diagnoses:  Left femur displaced fx evaluate and treat, CNA/bath aid,  and DME as recommended by rehab therapy staff.     · Prescriptions provided; refills as per PCP/specialists    Discharge Diagnoses w/ current management:  Physical Deconditioning/Weakness  Home therapies to ev

## 2020-08-31 PROBLEM — I97.190 COMPLETE AV BLOCK DUE TO AV NODAL ABLATION (CMD): Status: ACTIVE | Noted: 2020-08-31

## 2020-08-31 PROBLEM — I44.2 COMPLETE HEART BLOCK (CMD): Status: RESOLVED | Noted: 2019-12-03 | Resolved: 2020-08-31

## 2020-08-31 PROBLEM — I44.2 COMPLETE AV BLOCK DUE TO AV NODAL ABLATION (CMD): Status: ACTIVE | Noted: 2020-08-31

## 2020-09-01 ENCOUNTER — ANCILLARY PROCEDURE (OUTPATIENT)
Dept: CARDIOLOGY | Age: 85
End: 2020-09-01
Attending: INTERNAL MEDICINE

## 2020-09-01 ENCOUNTER — OFFICE VISIT (OUTPATIENT)
Dept: CARDIOLOGY | Age: 85
End: 2020-09-01

## 2020-09-01 VITALS
BODY MASS INDEX: 22.6 KG/M2 | DIASTOLIC BLOOD PRESSURE: 72 MMHG | WEIGHT: 140 LBS | SYSTOLIC BLOOD PRESSURE: 98 MMHG | HEART RATE: 70 BPM

## 2020-09-01 DIAGNOSIS — Z79.01 LONG TERM (CURRENT) USE OF ANTICOAGULANTS: ICD-10-CM

## 2020-09-01 DIAGNOSIS — Z98.890 HISTORY OF MITRAL VALVE REPAIR: ICD-10-CM

## 2020-09-01 DIAGNOSIS — I97.190 COMPLETE AV BLOCK DUE TO AV NODAL ABLATION (CMD): ICD-10-CM

## 2020-09-01 DIAGNOSIS — Z95.0 CARDIAC PACEMAKER: ICD-10-CM

## 2020-09-01 DIAGNOSIS — I08.0 MILD MITRAL AND AORTIC REGURGITATION: ICD-10-CM

## 2020-09-01 DIAGNOSIS — Z45.018 PACEMAKER REPROGRAMMING/CHECK: ICD-10-CM

## 2020-09-01 DIAGNOSIS — I48.20 ATRIAL FIBRILLATION, CHRONIC (CMD): Primary | ICD-10-CM

## 2020-09-01 DIAGNOSIS — Z86.73 HISTORY OF CVA (CEREBROVASCULAR ACCIDENT): ICD-10-CM

## 2020-09-01 DIAGNOSIS — I44.2 COMPLETE AV BLOCK DUE TO AV NODAL ABLATION (CMD): ICD-10-CM

## 2020-09-01 DIAGNOSIS — Z95.3 HISTORY OF AORTIC VALVE REPLACEMENT WITH BIOPROSTHETIC VALVE: ICD-10-CM

## 2020-09-01 DIAGNOSIS — Z95.0 PACEMAKER: ICD-10-CM

## 2020-09-01 PROCEDURE — 93280 PM DEVICE PROGR EVAL DUAL: CPT | Performed by: INTERNAL MEDICINE

## 2020-09-01 PROCEDURE — 99214 OFFICE O/P EST MOD 30 MIN: CPT | Performed by: INTERNAL MEDICINE

## 2020-09-01 RX ORDER — HYDROCODONE BITARTRATE AND ACETAMINOPHEN 5; 325 MG/1; MG/1
1 TABLET ORAL
COMMUNITY
Start: 2020-06-16 | End: 2021-09-14

## 2020-09-01 RX ORDER — POTASSIUM CHLORIDE 750 MG/1
20 TABLET, FILM COATED, EXTENDED RELEASE ORAL DAILY
COMMUNITY

## 2020-09-01 RX ORDER — POLYETHYLENE GLYCOL 3350 17 G/17G
17 POWDER, FOR SOLUTION ORAL
COMMUNITY
End: 2021-09-14

## 2020-09-01 RX ORDER — FUROSEMIDE 20 MG/1
40 TABLET ORAL
COMMUNITY

## 2020-09-01 RX ORDER — LIDOCAINE 50 MG/G
1 PATCH TOPICAL
COMMUNITY
End: 2021-09-14

## 2020-09-01 RX ORDER — DOCUSATE SODIUM 100 MG/1
100 CAPSULE, LIQUID FILLED ORAL
COMMUNITY
End: 2021-09-14

## 2020-09-01 ASSESSMENT — ENCOUNTER SYMPTOMS
CHILLS: 0
BRUISES/BLEEDS EASILY: 0
FEVER: 0
NEAR-SYNCOPE: 0
HEMATOCHEZIA: 0
WEIGHT GAIN: 0
FOCAL WEAKNESS: 1
HEMOPTYSIS: 0
ALLERGIC/IMMUNOLOGIC COMMENTS: NO NEW FOOD ALLERGIES
SUSPICIOUS LESIONS: 0
BLURRED VISION: 0
SYNCOPE: 0
WEIGHT LOSS: 0
COUGH: 0

## 2020-09-01 ASSESSMENT — PATIENT HEALTH QUESTIONNAIRE - PHQ9
SUM OF ALL RESPONSES TO PHQ9 QUESTIONS 1 AND 2: 0
SUM OF ALL RESPONSES TO PHQ9 QUESTIONS 1 AND 2: 0
2. FEELING DOWN, DEPRESSED OR HOPELESS: NOT AT ALL
CLINICAL INTERPRETATION OF PHQ2 SCORE: NO FURTHER SCREENING NEEDED
CLINICAL INTERPRETATION OF PHQ9 SCORE: NO FURTHER SCREENING NEEDED
1. LITTLE INTEREST OR PLEASURE IN DOING THINGS: NOT AT ALL

## 2020-09-03 NOTE — ADDENDUM NOTE
Addendum  created 09/03/20 1332 by Al Isaacs MD    Child order released for a procedure order, Clinical Note Signed, Intraprocedure Blocks edited, Order Canceled from Note, Pend clinical note

## 2020-09-03 NOTE — ANESTHESIA PROCEDURE NOTES
[facility]  Performed by: Nette Tripathi MD  Authorized by: Nette Tripathi MD       General Information and Staff    Start Time:  6/11/2020 2:26 PM  End Time:  6/11/2020 2:31 PM  Anesthesiologist:  Nette Tripathi MD  Patient Location:  OR    Block P

## 2020-09-09 RX ORDER — FUROSEMIDE 40 MG/1
TABLET ORAL
Qty: 30 TABLET | Refills: 0 | OUTPATIENT
Start: 2020-09-09

## 2020-09-09 NOTE — TELEPHONE ENCOUNTER
She needs to get it from her PCP we are not her PCP. Give 10 days worth, but is she taking 40mg or 20mg daily?

## 2020-09-23 ENCOUNTER — NURSE ONLY (OUTPATIENT)
Dept: LAB | Age: 85
End: 2020-09-23
Attending: FAMILY MEDICINE
Payer: MEDICARE

## 2020-09-23 DIAGNOSIS — R41.3 MEMORY LOSS: ICD-10-CM

## 2020-09-23 DIAGNOSIS — R60.0 LEG EDEMA: Primary | ICD-10-CM

## 2020-09-23 DIAGNOSIS — R26.9 ALTERED GAIT: ICD-10-CM

## 2020-09-23 DIAGNOSIS — M35.3 PMR (POLYMYALGIA RHEUMATICA) (HCC): ICD-10-CM

## 2020-09-23 LAB
ALBUMIN SERPL-MCNC: 2.7 G/DL (ref 3.4–5)
ALBUMIN/GLOB SERPL: 0.7 {RATIO} (ref 1–2)
ALP LIVER SERPL-CCNC: 71 U/L
ALT SERPL-CCNC: 17 U/L
ANION GAP SERPL CALC-SCNC: 4 MMOL/L (ref 0–18)
AST SERPL-CCNC: 19 U/L (ref 15–37)
BASOPHILS # BLD AUTO: 0.07 X10(3) UL (ref 0–0.2)
BASOPHILS NFR BLD AUTO: 1.3 %
BILIRUB SERPL-MCNC: 0.6 MG/DL (ref 0.1–2)
BUN BLD-MCNC: 20 MG/DL (ref 7–18)
BUN/CREAT SERPL: 17.4 (ref 10–20)
CALCIUM BLD-MCNC: 9.6 MG/DL (ref 8.5–10.1)
CHLORIDE SERPL-SCNC: 108 MMOL/L (ref 98–112)
CO2 SERPL-SCNC: 30 MMOL/L (ref 21–32)
CREAT BLD-MCNC: 1.15 MG/DL
DEPRECATED RDW RBC AUTO: 51.3 FL (ref 35.1–46.3)
EOSINOPHIL # BLD AUTO: 0.51 X10(3) UL (ref 0–0.7)
EOSINOPHIL NFR BLD AUTO: 9.3 %
ERYTHROCYTE [DISTWIDTH] IN BLOOD BY AUTOMATED COUNT: 14.5 % (ref 11–15)
GLOBULIN PLAS-MCNC: 3.9 G/DL (ref 2.8–4.4)
GLUCOSE BLD-MCNC: 100 MG/DL (ref 70–99)
HCT VFR BLD AUTO: 38 %
HGB BLD-MCNC: 12 G/DL
IMM GRANULOCYTES # BLD AUTO: 0.02 X10(3) UL (ref 0–1)
IMM GRANULOCYTES NFR BLD: 0.4 %
LYMPHOCYTES # BLD AUTO: 1.03 X10(3) UL (ref 1–4)
LYMPHOCYTES NFR BLD AUTO: 18.9 %
M PROTEIN MFR SERPL ELPH: 6.6 G/DL (ref 6.4–8.2)
MCH RBC QN AUTO: 30.4 PG (ref 26–34)
MCHC RBC AUTO-ENTMCNC: 31.6 G/DL (ref 31–37)
MCV RBC AUTO: 96.2 FL
MONOCYTES # BLD AUTO: 0.72 X10(3) UL (ref 0.1–1)
MONOCYTES NFR BLD AUTO: 13.2 %
NEUTROPHILS # BLD AUTO: 3.11 X10 (3) UL (ref 1.5–7.7)
NEUTROPHILS # BLD AUTO: 3.11 X10(3) UL (ref 1.5–7.7)
NEUTROPHILS NFR BLD AUTO: 56.9 %
OSMOLALITY SERPL CALC.SUM OF ELEC: 297 MOSM/KG (ref 275–295)
PATIENT FASTING Y/N/NP: YES
PLATELET # BLD AUTO: 131 10(3)UL (ref 150–450)
POTASSIUM SERPL-SCNC: 3.7 MMOL/L (ref 3.5–5.1)
RBC # BLD AUTO: 3.95 X10(6)UL
SODIUM SERPL-SCNC: 142 MMOL/L (ref 136–145)
WBC # BLD AUTO: 5.5 X10(3) UL (ref 4–11)

## 2020-09-23 PROCEDURE — 85025 COMPLETE CBC W/AUTO DIFF WBC: CPT

## 2020-09-23 PROCEDURE — 80053 COMPREHEN METABOLIC PANEL: CPT

## 2020-09-23 PROCEDURE — 36415 COLL VENOUS BLD VENIPUNCTURE: CPT

## 2020-10-07 ENCOUNTER — NURSE ONLY (OUTPATIENT)
Dept: LAB | Age: 85
End: 2020-10-07
Attending: FAMILY MEDICINE
Payer: MEDICARE

## 2020-10-07 DIAGNOSIS — Z79.899 NEED FOR PROPHYLACTIC CHEMOTHERAPY: Primary | ICD-10-CM

## 2020-10-07 PROCEDURE — 36415 COLL VENOUS BLD VENIPUNCTURE: CPT

## 2020-10-07 PROCEDURE — 80048 BASIC METABOLIC PNL TOTAL CA: CPT

## 2020-10-28 ENCOUNTER — NURSE ONLY (OUTPATIENT)
Dept: LAB | Age: 85
End: 2020-10-28
Attending: FAMILY MEDICINE
Payer: MEDICARE

## 2020-10-28 DIAGNOSIS — E87.6 HYPOKALEMIA: Primary | ICD-10-CM

## 2020-10-28 PROCEDURE — 36415 COLL VENOUS BLD VENIPUNCTURE: CPT

## 2020-10-28 PROCEDURE — 80048 BASIC METABOLIC PNL TOTAL CA: CPT

## 2020-12-06 ENCOUNTER — ANCILLARY ORDERS (OUTPATIENT)
Dept: CARDIOLOGY | Age: 85
End: 2020-12-06

## 2020-12-06 ENCOUNTER — ANCILLARY PROCEDURE (OUTPATIENT)
Dept: CARDIOLOGY | Age: 85
End: 2020-12-06
Attending: INTERNAL MEDICINE

## 2020-12-06 DIAGNOSIS — Z95.0 CARDIAC PACEMAKER: ICD-10-CM

## 2020-12-06 PROCEDURE — X1114 CARDIAC DEVICE HOME CHECK - REMOTE UNSCHEDULED: HCPCS | Performed by: INTERNAL MEDICINE

## 2021-01-13 ENCOUNTER — NURSE ONLY (OUTPATIENT)
Dept: LAB | Age: 86
End: 2021-01-13
Attending: FAMILY MEDICINE
Payer: MEDICARE

## 2021-01-13 DIAGNOSIS — E87.6 HYPOKALEMIA: Primary | ICD-10-CM

## 2021-01-13 LAB
ANION GAP SERPL CALC-SCNC: 3 MMOL/L (ref 0–18)
BUN BLD-MCNC: 27 MG/DL (ref 7–18)
BUN/CREAT SERPL: 22.5 (ref 10–20)
CALCIUM BLD-MCNC: 9.3 MG/DL (ref 8.5–10.1)
CHLORIDE SERPL-SCNC: 106 MMOL/L (ref 98–112)
CO2 SERPL-SCNC: 35 MMOL/L (ref 21–32)
CREAT BLD-MCNC: 1.2 MG/DL
GLUCOSE BLD-MCNC: 70 MG/DL (ref 70–99)
OSMOLALITY SERPL CALC.SUM OF ELEC: 302 MOSM/KG (ref 275–295)
PATIENT FASTING Y/N/NP: YES
POTASSIUM SERPL-SCNC: 3.8 MMOL/L (ref 3.5–5.1)
SODIUM SERPL-SCNC: 144 MMOL/L (ref 136–145)

## 2021-01-13 PROCEDURE — 80048 BASIC METABOLIC PNL TOTAL CA: CPT

## 2021-01-13 PROCEDURE — 36415 COLL VENOUS BLD VENIPUNCTURE: CPT

## 2021-03-11 ENCOUNTER — ANCILLARY ORDERS (OUTPATIENT)
Dept: CARDIOLOGY | Age: 86
End: 2021-03-11

## 2021-03-11 ENCOUNTER — ANCILLARY PROCEDURE (OUTPATIENT)
Dept: CARDIOLOGY | Age: 86
End: 2021-03-11
Attending: INTERNAL MEDICINE

## 2021-03-11 DIAGNOSIS — Z95.0 CARDIAC PACEMAKER IN SITU: ICD-10-CM

## 2021-03-11 PROCEDURE — X1114 CARDIAC DEVICE HOME CHECK - REMOTE UNSCHEDULED: HCPCS | Performed by: INTERNAL MEDICINE

## 2021-04-07 ENCOUNTER — NURSE ONLY (OUTPATIENT)
Dept: LAB | Age: 86
End: 2021-04-07
Attending: FAMILY MEDICINE
Payer: MEDICARE

## 2021-04-07 DIAGNOSIS — Z79.899 NEED FOR PROPHYLACTIC CHEMOTHERAPY: Primary | ICD-10-CM

## 2021-04-07 PROCEDURE — 80053 COMPREHEN METABOLIC PANEL: CPT

## 2021-04-07 PROCEDURE — 85025 COMPLETE CBC W/AUTO DIFF WBC: CPT

## 2021-04-07 PROCEDURE — 36415 COLL VENOUS BLD VENIPUNCTURE: CPT

## 2021-05-12 ENCOUNTER — NURSE ONLY (OUTPATIENT)
Dept: LAB | Age: 86
End: 2021-05-12
Attending: FAMILY MEDICINE
Payer: MEDICARE

## 2021-05-12 DIAGNOSIS — E55.9 VITAMIN D DEFICIENCY: Primary | ICD-10-CM

## 2021-05-12 DIAGNOSIS — E87.6 HYPOKALEMIA: ICD-10-CM

## 2021-05-12 PROCEDURE — 80048 BASIC METABOLIC PNL TOTAL CA: CPT

## 2021-05-12 PROCEDURE — 82306 VITAMIN D 25 HYDROXY: CPT

## 2021-05-12 PROCEDURE — 36415 COLL VENOUS BLD VENIPUNCTURE: CPT

## 2021-06-18 ENCOUNTER — ANCILLARY ORDERS (OUTPATIENT)
Dept: CARDIOLOGY | Age: 86
End: 2021-06-18

## 2021-06-18 ENCOUNTER — ANCILLARY PROCEDURE (OUTPATIENT)
Dept: CARDIOLOGY | Age: 86
End: 2021-06-18
Attending: INTERNAL MEDICINE

## 2021-06-18 DIAGNOSIS — Z95.0 CARDIAC PACEMAKER: ICD-10-CM

## 2021-06-18 PROCEDURE — X1114 CARDIAC DEVICE HOME CHECK - REMOTE UNSCHEDULED: HCPCS | Performed by: INTERNAL MEDICINE

## 2021-06-18 PROCEDURE — 93294 REM INTERROG EVL PM/LDLS PM: CPT | Performed by: INTERNAL MEDICINE

## 2021-07-28 ENCOUNTER — NURSE ONLY (OUTPATIENT)
Dept: LAB | Age: 86
End: 2021-07-28
Attending: FAMILY MEDICINE
Payer: MEDICARE

## 2021-07-28 DIAGNOSIS — Z79.899 LONG-TERM USE OF HIGH-RISK MEDICATION: Primary | ICD-10-CM

## 2021-07-28 LAB
ALBUMIN SERPL-MCNC: 3.1 G/DL (ref 3.4–5)
ALBUMIN/GLOB SERPL: 0.9 {RATIO} (ref 1–2)
ALP LIVER SERPL-CCNC: 70 U/L
ALT SERPL-CCNC: 18 U/L
ANION GAP SERPL CALC-SCNC: 5 MMOL/L (ref 0–18)
AST SERPL-CCNC: 20 U/L (ref 15–37)
BILIRUB SERPL-MCNC: 0.7 MG/DL (ref 0.1–2)
BUN BLD-MCNC: 24 MG/DL (ref 7–18)
BUN/CREAT SERPL: 20.9 (ref 10–20)
CALCIUM BLD-MCNC: 9 MG/DL (ref 8.5–10.1)
CHLORIDE SERPL-SCNC: 106 MMOL/L (ref 98–112)
CO2 SERPL-SCNC: 33 MMOL/L (ref 21–32)
CREAT BLD-MCNC: 1.15 MG/DL
GLOBULIN PLAS-MCNC: 3.5 G/DL (ref 2.8–4.4)
GLUCOSE BLD-MCNC: 128 MG/DL (ref 70–99)
M PROTEIN MFR SERPL ELPH: 6.6 G/DL (ref 6.4–8.2)
OSMOLALITY SERPL CALC.SUM OF ELEC: 304 MOSM/KG (ref 275–295)
PATIENT FASTING Y/N/NP: NO
POTASSIUM SERPL-SCNC: 3.4 MMOL/L (ref 3.5–5.1)
SODIUM SERPL-SCNC: 144 MMOL/L (ref 136–145)

## 2021-07-28 PROCEDURE — 80053 COMPREHEN METABOLIC PANEL: CPT

## 2021-07-28 PROCEDURE — 36415 COLL VENOUS BLD VENIPUNCTURE: CPT

## 2021-09-10 PROBLEM — Z45.010 PACEMAKER BATTERY DEPLETION: Status: ACTIVE | Noted: 2021-09-10

## 2021-09-14 ENCOUNTER — DOCUMENTATION (OUTPATIENT)
Dept: CARDIOLOGY | Age: 86
End: 2021-09-14

## 2021-09-14 ENCOUNTER — OFFICE VISIT (OUTPATIENT)
Dept: CARDIOLOGY | Age: 86
End: 2021-09-14

## 2021-09-14 ENCOUNTER — ANCILLARY PROCEDURE (OUTPATIENT)
Dept: CARDIOLOGY | Age: 86
End: 2021-09-14
Attending: INTERNAL MEDICINE

## 2021-09-14 ENCOUNTER — PREP FOR CASE (OUTPATIENT)
Dept: CARDIOLOGY | Age: 86
End: 2021-09-14

## 2021-09-14 VITALS — DIASTOLIC BLOOD PRESSURE: 80 MMHG | SYSTOLIC BLOOD PRESSURE: 142 MMHG | HEART RATE: 75 BPM

## 2021-09-14 VITALS — HEART RATE: 75 BPM | DIASTOLIC BLOOD PRESSURE: 80 MMHG | SYSTOLIC BLOOD PRESSURE: 132 MMHG

## 2021-09-14 DIAGNOSIS — I44.2 COMPLETE AV BLOCK DUE TO AV NODAL ABLATION (CMD): ICD-10-CM

## 2021-09-14 DIAGNOSIS — Z95.0 CARDIAC PACEMAKER: ICD-10-CM

## 2021-09-14 DIAGNOSIS — Z95.0 PACEMAKER: Primary | ICD-10-CM

## 2021-09-14 DIAGNOSIS — I08.0 MILD MITRAL AND AORTIC REGURGITATION: ICD-10-CM

## 2021-09-14 DIAGNOSIS — Z79.01 LONG TERM (CURRENT) USE OF ANTICOAGULANTS: ICD-10-CM

## 2021-09-14 DIAGNOSIS — Z45.010 PACEMAKER BATTERY DEPLETION: ICD-10-CM

## 2021-09-14 DIAGNOSIS — Z98.890 S/P AV NODAL ABLATION: ICD-10-CM

## 2021-09-14 DIAGNOSIS — Z95.3 HISTORY OF AORTIC VALVE REPLACEMENT WITH BIOPROSTHETIC VALVE: ICD-10-CM

## 2021-09-14 DIAGNOSIS — I97.190 COMPLETE AV BLOCK DUE TO AV NODAL ABLATION (CMD): ICD-10-CM

## 2021-09-14 DIAGNOSIS — Z45.018 PACEMAKER REPROGRAMMING/CHECK: ICD-10-CM

## 2021-09-14 DIAGNOSIS — Z98.890 HISTORY OF MITRAL VALVE REPAIR: ICD-10-CM

## 2021-09-14 DIAGNOSIS — I48.20 ATRIAL FIBRILLATION, CHRONIC (CMD): ICD-10-CM

## 2021-09-14 PROCEDURE — 93279 PRGRMG DEV EVAL PM/LDLS PM: CPT | Performed by: INTERNAL MEDICINE

## 2021-09-14 PROCEDURE — 99215 OFFICE O/P EST HI 40 MIN: CPT | Performed by: INTERNAL MEDICINE

## 2021-09-14 RX ORDER — ATORVASTATIN CALCIUM 20 MG/1
20 TABLET, FILM COATED ORAL DAILY
COMMUNITY

## 2021-09-14 RX ORDER — SODIUM CHLORIDE 9 MG/ML
INJECTION, SOLUTION INTRAVENOUS CONTINUOUS
Status: CANCELLED | OUTPATIENT
Start: 2021-09-14

## 2021-09-14 ASSESSMENT — ENCOUNTER SYMPTOMS
COUGH: 0
BRUISES/BLEEDS EASILY: 0
SYNCOPE: 0
SHORTNESS OF BREATH: 0
CHILLS: 0
WEIGHT LOSS: 0
HEMOPTYSIS: 0
NEAR-SYNCOPE: 0
WEAKNESS: 1
HEMATOCHEZIA: 0
FEVER: 0
ALLERGIC/IMMUNOLOGIC COMMENTS: NO NEW FOOD ALLERGIES
WEIGHT GAIN: 0
DEPRESSION: 0
SUSPICIOUS LESIONS: 0

## 2021-09-14 ASSESSMENT — PATIENT HEALTH QUESTIONNAIRE - PHQ9
2. FEELING DOWN, DEPRESSED OR HOPELESS: NOT AT ALL
CLINICAL INTERPRETATION OF PHQ9 SCORE: NO FURTHER SCREENING NEEDED
SUM OF ALL RESPONSES TO PHQ9 QUESTIONS 1 AND 2: 0
1. LITTLE INTEREST OR PLEASURE IN DOING THINGS: NOT AT ALL
SUM OF ALL RESPONSES TO PHQ9 QUESTIONS 1 AND 2: 0
CLINICAL INTERPRETATION OF PHQ2 SCORE: NO FURTHER SCREENING NEEDED

## 2021-09-15 DIAGNOSIS — Z01.812 PRE-PROCEDURE LAB EXAM: Primary | ICD-10-CM

## 2021-09-22 ENCOUNTER — NURSE ONLY (OUTPATIENT)
Dept: LAB | Age: 86
End: 2021-09-22
Attending: FAMILY MEDICINE
Payer: MEDICARE

## 2021-09-22 DIAGNOSIS — E78.5 HYPERLIPIDEMIA: Primary | ICD-10-CM

## 2021-09-22 LAB
ALBUMIN SERPL-MCNC: 3 G/DL (ref 3.4–5)
ALBUMIN/GLOB SERPL: 0.8 {RATIO} (ref 1–2)
ALP LIVER SERPL-CCNC: 76 U/L
ALT SERPL-CCNC: 21 U/L
ANION GAP SERPL CALC-SCNC: 5 MMOL/L (ref 0–18)
AST SERPL-CCNC: 22 U/L (ref 15–37)
BILIRUB SERPL-MCNC: 0.7 MG/DL (ref 0.1–2)
BUN BLD-MCNC: 26 MG/DL (ref 7–18)
CALCIUM BLD-MCNC: 9.1 MG/DL (ref 8.5–10.1)
CHLORIDE SERPL-SCNC: 107 MMOL/L (ref 98–112)
CHOLEST SERPL-MCNC: 258 MG/DL (ref ?–200)
CO2 SERPL-SCNC: 32 MMOL/L (ref 21–32)
CREAT BLD-MCNC: 1.11 MG/DL
GLOBULIN PLAS-MCNC: 3.6 G/DL (ref 2.8–4.4)
GLUCOSE BLD-MCNC: 128 MG/DL (ref 70–99)
HDLC SERPL-MCNC: 70 MG/DL (ref 40–59)
LDLC SERPL CALC-MCNC: 176 MG/DL (ref ?–100)
NONHDLC SERPL-MCNC: 188 MG/DL (ref ?–130)
OSMOLALITY SERPL CALC.SUM OF ELEC: 304 MOSM/KG (ref 275–295)
PATIENT FASTING Y/N/NP: NO
PATIENT FASTING Y/N/NP: NO
POTASSIUM SERPL-SCNC: 3.6 MMOL/L (ref 3.5–5.1)
PROT SERPL-MCNC: 6.6 G/DL (ref 6.4–8.2)
SODIUM SERPL-SCNC: 144 MMOL/L (ref 136–145)
TRIGL SERPL-MCNC: 74 MG/DL (ref 30–149)
VLDLC SERPL CALC-MCNC: 15 MG/DL (ref 0–30)

## 2021-09-22 PROCEDURE — 80061 LIPID PANEL: CPT

## 2021-09-22 PROCEDURE — 36415 COLL VENOUS BLD VENIPUNCTURE: CPT

## 2021-09-22 PROCEDURE — 80053 COMPREHEN METABOLIC PANEL: CPT

## 2021-09-27 ENCOUNTER — LAB SERVICES (OUTPATIENT)
Dept: LAB | Age: 86
End: 2021-09-27

## 2021-09-27 DIAGNOSIS — Z01.812 PRE-PROCEDURE LAB EXAM: ICD-10-CM

## 2021-09-27 LAB
SARS-COV-2 RNA RESP QL NAA+PROBE: NOT DETECTED
SERVICE CMNT-IMP: NORMAL
SERVICE CMNT-IMP: NORMAL

## 2021-09-27 PROCEDURE — U0005 INFEC AGEN DETEC AMPLI PROBE: HCPCS | Performed by: CLINICAL MEDICAL LABORATORY

## 2021-09-27 PROCEDURE — U0003 INFECTIOUS AGENT DETECTION BY NUCLEIC ACID (DNA OR RNA); SEVERE ACUTE RESPIRATORY SYNDROME CORONAVIRUS 2 (SARS-COV-2) (CORONAVIRUS DISEASE [COVID-19]), AMPLIFIED PROBE TECHNIQUE, MAKING USE OF HIGH THROUGHPUT TECHNOLOGIES AS DESCRIBED BY CMS-2020-01-R: HCPCS | Performed by: CLINICAL MEDICAL LABORATORY

## 2021-09-29 ENCOUNTER — HOSPITAL ENCOUNTER (OUTPATIENT)
Age: 86
Discharge: HOME | End: 2021-09-29
Attending: INTERNAL MEDICINE | Admitting: INTERNAL MEDICINE

## 2021-09-29 VITALS
WEIGHT: 159.61 LBS | SYSTOLIC BLOOD PRESSURE: 142 MMHG | OXYGEN SATURATION: 99 % | HEIGHT: 68 IN | DIASTOLIC BLOOD PRESSURE: 65 MMHG | TEMPERATURE: 97 F | HEART RATE: 71 BPM | RESPIRATION RATE: 16 BRPM | BODY MASS INDEX: 24.19 KG/M2

## 2021-09-29 DIAGNOSIS — Z98.890 S/P AV NODAL ABLATION: ICD-10-CM

## 2021-09-29 DIAGNOSIS — Z95.0 PACEMAKER: ICD-10-CM

## 2021-09-29 LAB
ANION GAP SERPL CALC-SCNC: 3 MMOL/L (ref 10–20)
ATRIAL RATE (BPM): 74
BUN SERPL-MCNC: 26 MG/DL (ref 6–20)
BUN/CREAT SERPL: 24 (ref 7–25)
CALCIUM SERPL-MCNC: 9 MG/DL (ref 8.4–10.2)
CHLORIDE SERPL-SCNC: 108 MMOL/L (ref 98–107)
CO2 SERPL-SCNC: 34 MMOL/L (ref 21–32)
CREAT SERPL-MCNC: 1.09 MG/DL (ref 0.51–0.95)
DEPRECATED RDW RBC: 50.9 FL (ref 39–50)
ERYTHROCYTE [DISTWIDTH] IN BLOOD: 14 % (ref 11–15)
FASTING DURATION TIME PATIENT: ABNORMAL H
GFR SERPLBLD BASED ON 1.73 SQ M-ARVRAT: 45 ML/MIN
GLUCOSE SERPL-MCNC: 84 MG/DL (ref 70–99)
HCT VFR BLD CALC: 43.9 % (ref 36–46.5)
HGB BLD-MCNC: 14.1 G/DL (ref 12–15.5)
MAGNESIUM SERPL-MCNC: 2.4 MG/DL (ref 1.7–2.4)
MCH RBC QN AUTO: 31.5 PG (ref 26–34)
MCHC RBC AUTO-ENTMCNC: 32.1 G/DL (ref 32–36.5)
MCV RBC AUTO: 98 FL (ref 78–100)
NRBC BLD MANUAL-RTO: 0 /100 WBC
PLATELET # BLD AUTO: 113 K/MCL (ref 140–450)
POTASSIUM SERPL-SCNC: 3.9 MMOL/L (ref 3.4–5.1)
QRS-INTERVAL (MSEC): 162
QT-INTERVAL (MSEC): 446
QTC: 495
R AXIS (DEGREES): -69
RBC # BLD: 4.48 MIL/MCL (ref 4–5.2)
REPORT TEXT: NORMAL
SODIUM SERPL-SCNC: 141 MMOL/L (ref 135–145)
T AXIS (DEGREES): 102
VENTRICULAR RATE EKG/MIN (BPM): 74
WBC # BLD: 5.8 K/MCL (ref 4.2–11)

## 2021-09-29 PROCEDURE — 93005 ELECTROCARDIOGRAM TRACING: CPT | Performed by: INTERNAL MEDICINE

## 2021-09-29 PROCEDURE — 99153 MOD SED SAME PHYS/QHP EA: CPT | Performed by: INTERNAL MEDICINE

## 2021-09-29 PROCEDURE — 10002800 HB RX 250 W HCPCS: Performed by: INTERNAL MEDICINE

## 2021-09-29 PROCEDURE — 10002801 HB RX 250 W/O HCPCS: Performed by: INTERNAL MEDICINE

## 2021-09-29 PROCEDURE — 10006025 HB SUPPLY 275: Performed by: INTERNAL MEDICINE

## 2021-09-29 PROCEDURE — C1785 PMKR, DUAL, RATE-RESP: HCPCS | Performed by: INTERNAL MEDICINE

## 2021-09-29 PROCEDURE — 85027 COMPLETE CBC AUTOMATED: CPT | Performed by: INTERNAL MEDICINE

## 2021-09-29 PROCEDURE — 99152 MOD SED SAME PHYS/QHP 5/>YRS: CPT | Performed by: INTERNAL MEDICINE

## 2021-09-29 PROCEDURE — 80048 BASIC METABOLIC PNL TOTAL CA: CPT | Performed by: INTERNAL MEDICINE

## 2021-09-29 PROCEDURE — 13000001 HB PHASE II RECOVERY EA 30 MINUTES: Performed by: INTERNAL MEDICINE

## 2021-09-29 PROCEDURE — 33228 REMV&REPLC PM GEN DUAL LEAD: CPT | Performed by: INTERNAL MEDICINE

## 2021-09-29 PROCEDURE — 10006023 HB SUPPLY 272: Performed by: INTERNAL MEDICINE

## 2021-09-29 PROCEDURE — 83735 ASSAY OF MAGNESIUM: CPT | Performed by: INTERNAL MEDICINE

## 2021-09-29 DEVICE — PULSE GENERATOR
Type: IMPLANTABLE DEVICE | Site: CHEST | Status: FUNCTIONAL
Brand: ASSURITY MRI™

## 2021-09-29 RX ORDER — MIDAZOLAM HYDROCHLORIDE 1 MG/ML
INJECTION, SOLUTION INTRAMUSCULAR; INTRAVENOUS PRN
Status: DISCONTINUED | OUTPATIENT
Start: 2021-09-29 | End: 2021-09-29 | Stop reason: HOSPADM

## 2021-09-29 RX ORDER — LIDOCAINE HYDROCHLORIDE 20 MG/ML
INJECTION, SOLUTION EPIDURAL; INFILTRATION; INTRACAUDAL; PERINEURAL PRN
Status: DISCONTINUED | OUTPATIENT
Start: 2021-09-29 | End: 2021-09-29 | Stop reason: HOSPADM

## 2021-09-29 RX ORDER — CEFAZOLIN SODIUM 1 G/3ML
INJECTION, POWDER, FOR SOLUTION INTRAMUSCULAR; INTRAVENOUS PRN
Status: DISCONTINUED | OUTPATIENT
Start: 2021-09-29 | End: 2021-09-29 | Stop reason: HOSPADM

## 2021-09-29 RX ORDER — SODIUM CHLORIDE 9 MG/ML
INJECTION, SOLUTION INTRAVENOUS CONTINUOUS
Status: DISCONTINUED | OUTPATIENT
Start: 2021-09-29 | End: 2021-09-29 | Stop reason: HOSPADM

## 2021-09-29 ASSESSMENT — PAIN SCALES - GENERAL
PAINLEVEL_OUTOF10: 0

## 2021-09-29 ASSESSMENT — LIFESTYLE VARIABLES: SMOKING_HISTORY: NO

## 2021-09-30 ENCOUNTER — OFF PREMISE (OUTPATIENT)
Dept: CARDIOLOGY | Age: 86
End: 2021-09-30

## 2021-10-01 ENCOUNTER — TELEPHONE (OUTPATIENT)
Dept: CARDIOLOGY | Age: 86
End: 2021-10-01

## 2021-10-01 ENCOUNTER — OFFICE VISIT (OUTPATIENT)
Dept: CARDIOLOGY | Age: 86
End: 2021-10-01

## 2021-10-01 VITALS
WEIGHT: 155 LBS | BODY MASS INDEX: 24.91 KG/M2 | HEART RATE: 70 BPM | DIASTOLIC BLOOD PRESSURE: 82 MMHG | SYSTOLIC BLOOD PRESSURE: 130 MMHG | HEIGHT: 66 IN

## 2021-10-01 DIAGNOSIS — R21 RASH: Primary | ICD-10-CM

## 2021-10-01 PROCEDURE — 99214 OFFICE O/P EST MOD 30 MIN: CPT | Performed by: NURSE PRACTITIONER

## 2021-10-01 RX ORDER — CHOLECALCIFEROL (VITAMIN D3) 125 MCG
CAPSULE ORAL DAILY
COMMUNITY

## 2021-10-01 SDOH — HEALTH STABILITY: PHYSICAL HEALTH: ON AVERAGE, HOW MANY MINUTES DO YOU ENGAGE IN EXERCISE AT THIS LEVEL?: 10 MIN

## 2021-10-01 SDOH — HEALTH STABILITY: PHYSICAL HEALTH: ON AVERAGE, HOW MANY DAYS PER WEEK DO YOU ENGAGE IN MODERATE TO STRENUOUS EXERCISE (LIKE A BRISK WALK)?: 7 DAYS

## 2021-10-01 ASSESSMENT — PATIENT HEALTH QUESTIONNAIRE - PHQ9
2. FEELING DOWN, DEPRESSED OR HOPELESS: NOT AT ALL
CLINICAL INTERPRETATION OF PHQ9 SCORE: NO FURTHER SCREENING NEEDED
1. LITTLE INTEREST OR PLEASURE IN DOING THINGS: NOT AT ALL
SUM OF ALL RESPONSES TO PHQ9 QUESTIONS 1 AND 2: 0
SUM OF ALL RESPONSES TO PHQ9 QUESTIONS 1 AND 2: 0
CLINICAL INTERPRETATION OF PHQ2 SCORE: NO FURTHER SCREENING NEEDED

## 2021-10-02 ENCOUNTER — APPOINTMENT (OUTPATIENT)
Dept: GENERAL RADIOLOGY | Facility: HOSPITAL | Age: 86
DRG: 536 | End: 2021-10-02
Attending: EMERGENCY MEDICINE
Payer: MEDICARE

## 2021-10-02 ENCOUNTER — APPOINTMENT (OUTPATIENT)
Dept: CT IMAGING | Facility: HOSPITAL | Age: 86
DRG: 536 | End: 2021-10-02
Attending: EMERGENCY MEDICINE
Payer: MEDICARE

## 2021-10-02 ENCOUNTER — HOSPITAL ENCOUNTER (INPATIENT)
Facility: HOSPITAL | Age: 86
LOS: 3 days | Discharge: NURSING, REHAB OR LONG-TERM CARE | DRG: 536 | End: 2021-10-05
Attending: EMERGENCY MEDICINE | Admitting: HOSPITALIST
Payer: MEDICARE
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
## 2021-10-02 DIAGNOSIS — S32.9XXA CLOSED NONDISPLACED FRACTURE OF PELVIS, UNSPECIFIED PART OF PELVIS, INITIAL ENCOUNTER (HCC): Primary | ICD-10-CM

## 2021-10-02 DIAGNOSIS — D69.6 THROMBOCYTOPENIA (HCC): ICD-10-CM

## 2021-10-02 PROBLEM — R79.89 AZOTEMIA: Status: ACTIVE | Noted: 2021-10-02

## 2021-10-02 PROBLEM — E87.6 HYPOKALEMIA: Status: ACTIVE | Noted: 2021-10-02

## 2021-10-02 PROBLEM — R73.9 HYPERGLYCEMIA: Status: ACTIVE | Noted: 2021-10-02

## 2021-10-02 PROCEDURE — 73502 X-RAY EXAM HIP UNI 2-3 VIEWS: CPT | Performed by: EMERGENCY MEDICINE

## 2021-10-02 PROCEDURE — 70450 CT HEAD/BRAIN W/O DYE: CPT | Performed by: EMERGENCY MEDICINE

## 2021-10-02 PROCEDURE — 99223 1ST HOSP IP/OBS HIGH 75: CPT | Performed by: HOSPITALIST

## 2021-10-02 PROCEDURE — 71045 X-RAY EXAM CHEST 1 VIEW: CPT | Performed by: EMERGENCY MEDICINE

## 2021-10-02 RX ORDER — HYDROCODONE BITARTRATE AND ACETAMINOPHEN 5; 325 MG/1; MG/1
1 TABLET ORAL EVERY 4 HOURS PRN
Status: DISCONTINUED | OUTPATIENT
Start: 2021-10-02 | End: 2021-10-05

## 2021-10-02 RX ORDER — PREDNISONE 1 MG/1
5 TABLET ORAL DAILY
Status: DISCONTINUED | OUTPATIENT
Start: 2021-10-02 | End: 2021-10-05

## 2021-10-02 RX ORDER — MORPHINE SULFATE 2 MG/ML
1 INJECTION, SOLUTION INTRAMUSCULAR; INTRAVENOUS EVERY 2 HOUR PRN
Status: DISCONTINUED | OUTPATIENT
Start: 2021-10-02 | End: 2021-10-05

## 2021-10-02 RX ORDER — ACETAMINOPHEN 325 MG/1
650 TABLET ORAL EVERY 4 HOURS PRN
Status: DISCONTINUED | OUTPATIENT
Start: 2021-10-02 | End: 2021-10-05

## 2021-10-02 RX ORDER — SODIUM CHLORIDE 9 MG/ML
INJECTION, SOLUTION INTRAVENOUS CONTINUOUS
Status: ACTIVE | OUTPATIENT
Start: 2021-10-02 | End: 2021-10-02

## 2021-10-02 RX ORDER — FUROSEMIDE 20 MG/1
20 TABLET ORAL EVERY OTHER DAY
COMMUNITY
End: 2021-10-05

## 2021-10-02 RX ORDER — ONDANSETRON 2 MG/ML
4 INJECTION INTRAMUSCULAR; INTRAVENOUS ONCE
Status: COMPLETED | OUTPATIENT
Start: 2021-10-02 | End: 2021-10-02

## 2021-10-02 RX ORDER — MORPHINE SULFATE 4 MG/ML
4 INJECTION, SOLUTION INTRAMUSCULAR; INTRAVENOUS EVERY 30 MIN PRN
Status: ACTIVE | OUTPATIENT
Start: 2021-10-02 | End: 2021-10-02

## 2021-10-02 RX ORDER — MORPHINE SULFATE 4 MG/ML
4 INJECTION, SOLUTION INTRAMUSCULAR; INTRAVENOUS EVERY 30 MIN PRN
Status: DISCONTINUED | OUTPATIENT
Start: 2021-10-02 | End: 2021-10-02

## 2021-10-02 RX ORDER — METOCLOPRAMIDE HYDROCHLORIDE 5 MG/ML
5 INJECTION INTRAMUSCULAR; INTRAVENOUS EVERY 8 HOURS PRN
Status: DISCONTINUED | OUTPATIENT
Start: 2021-10-02 | End: 2021-10-05

## 2021-10-02 RX ORDER — FUROSEMIDE 20 MG/1
20 TABLET ORAL EVERY OTHER DAY
Status: DISCONTINUED | OUTPATIENT
Start: 2021-10-03 | End: 2021-10-03

## 2021-10-02 RX ORDER — SODIUM CHLORIDE 9 MG/ML
125 INJECTION, SOLUTION INTRAVENOUS CONTINUOUS
Status: DISCONTINUED | OUTPATIENT
Start: 2021-10-02 | End: 2021-10-03

## 2021-10-02 RX ORDER — ONDANSETRON 2 MG/ML
4 INJECTION INTRAMUSCULAR; INTRAVENOUS EVERY 4 HOURS PRN
Status: ACTIVE | OUTPATIENT
Start: 2021-10-02 | End: 2021-10-02

## 2021-10-02 RX ORDER — POLYETHYLENE GLYCOL 3350 17 G/17G
17 POWDER, FOR SOLUTION ORAL DAILY PRN
Status: DISCONTINUED | OUTPATIENT
Start: 2021-10-02 | End: 2021-10-05

## 2021-10-02 RX ORDER — POTASSIUM CHLORIDE 750 MG/1
20 TABLET, EXTENDED RELEASE ORAL EVERY OTHER DAY
COMMUNITY
End: 2021-10-05

## 2021-10-02 RX ORDER — MORPHINE SULFATE 2 MG/ML
2 INJECTION, SOLUTION INTRAMUSCULAR; INTRAVENOUS EVERY 2 HOUR PRN
Status: DISCONTINUED | OUTPATIENT
Start: 2021-10-02 | End: 2021-10-05

## 2021-10-02 RX ORDER — ATORVASTATIN CALCIUM 20 MG/1
1 TABLET, FILM COATED ORAL DAILY
COMMUNITY
Start: 2021-10-01

## 2021-10-02 RX ORDER — ATORVASTATIN CALCIUM 20 MG/1
20 TABLET, FILM COATED ORAL DAILY
Status: DISCONTINUED | OUTPATIENT
Start: 2021-10-02 | End: 2021-10-05

## 2021-10-02 RX ORDER — ENOXAPARIN SODIUM 100 MG/ML
40 INJECTION SUBCUTANEOUS DAILY
Status: DISCONTINUED | OUTPATIENT
Start: 2021-10-02 | End: 2021-10-02

## 2021-10-02 RX ORDER — DOCUSATE SODIUM 100 MG/1
100 CAPSULE, LIQUID FILLED ORAL 2 TIMES DAILY
Status: DISCONTINUED | OUTPATIENT
Start: 2021-10-02 | End: 2021-10-05

## 2021-10-02 RX ORDER — ONDANSETRON 2 MG/ML
4 INJECTION INTRAMUSCULAR; INTRAVENOUS EVERY 6 HOURS PRN
Status: DISCONTINUED | OUTPATIENT
Start: 2021-10-02 | End: 2021-10-05

## 2021-10-02 RX ORDER — HYDROCODONE BITARTRATE AND ACETAMINOPHEN 5; 325 MG/1; MG/1
2 TABLET ORAL EVERY 4 HOURS PRN
Status: DISCONTINUED | OUTPATIENT
Start: 2021-10-02 | End: 2021-10-05

## 2021-10-02 RX ORDER — POTASSIUM CHLORIDE 20 MEQ/1
40 TABLET, EXTENDED RELEASE ORAL EVERY 4 HOURS
Status: COMPLETED | OUTPATIENT
Start: 2021-10-02 | End: 2021-10-02

## 2021-10-02 NOTE — ED PROVIDER NOTES
Patient Seen in: [facility] Emergency Department      History   Patient presents with:  Trauma    Stated Complaint: Fall    Subjective:   HPI    Patient is a 51-year-old female who states she woke up around 5 AM and tried to walk to her walker.   Angie Álvarez pupils equal round reactive to light and accommodation. Mouth normal, neck supple, no meningismus. LUNGS: Lungs clear to auscultation bilaterally. CARDIOVASCULAR: + S1-S2, regular rate and rhythm, no murmurs.   BACK: No CVA tenderness, no midline bony te individual orders. URINALYSIS WITH CULTURE REFLEX   TYPE AND SCREEN    Narrative: The following orders were created for panel order Type and screen.   Procedure                               Abnormality         Status                     --------- encounter     Disposition:  Admit  10/2/2021  9:13 am    Follow-up:  No follow-up provider specified.         Medications Prescribed:  Current Discharge Medication List                          Hospital Problems             Present on Admission  Date Review

## 2021-10-02 NOTE — PLAN OF CARE
Consult Received    80 F with right superior and inferior pubic rami fractures  Plan for non-op   WBAT with walker, PT and pain control  Will see tomorrow AM    Bushra Mondragon MD  [address] [facility] Certified Orthopedic Surgeon  Hand and Upper Extr

## 2021-10-02 NOTE — H&P
[facility]  History and Physical     Malen Genie Patient Status:  Inpatient    2/3/1932 MRN [id number]   [facility] 3SW-A Attending Heidi Smith MD   Hosp Day # 0 PCP Carlee Low DO     Chief Complaint: s/p fall    Histor Disp: , Rfl:   potassium chloride 10 MEQ Oral Tab CR, Take 20 mEq by mouth every other day. Every second day at lunch, Disp: , Rfl:   docusate sodium 100 MG Oral Cap, Take 100 mg by mouth 2 (two) times daily. , Disp: , Rfl:   PEG 3350 Oral Powd Pack, Take 1 7. 6   HGB 13.8   MCV 97.9   .0*   INR 1.28*       Recent Labs   Lab 10/02/21  0740   *   BUN 34*   CREATSERUM 1.12*   GFRAA 50*   GFRNAA 44*   CA 9.1   ALB 3.2*      K 3.4*      CO2 32.0   ALKPHO 80   AST 30   ALT 24   BILT 0.8

## 2021-10-02 NOTE — PLAN OF CARE
Pt a&O. Forgetful at times. On O2 2-3 L per Nc.  monitoring maintained. Scds to BLE. Tolerating diet with fair appetite. Chocolate ensure BID added for supplement. Last BM 10/1/21. Pt was incontinent of urine upon arrival. Pure wick in place.  Pain manag Discussed with Dr Gogo Dsouza and order placed.

## 2021-10-03 ENCOUNTER — APPOINTMENT (OUTPATIENT)
Dept: GENERAL RADIOLOGY | Facility: HOSPITAL | Age: 86
DRG: 536 | End: 2021-10-03
Attending: HOSPITALIST
Payer: MEDICARE

## 2021-10-03 PROCEDURE — 99232 SBSQ HOSP IP/OBS MODERATE 35: CPT | Performed by: HOSPITALIST

## 2021-10-03 PROCEDURE — 73030 X-RAY EXAM OF SHOULDER: CPT | Performed by: HOSPITALIST

## 2021-10-03 RX ORDER — SODIUM CHLORIDE 9 MG/ML
INJECTION, SOLUTION INTRAVENOUS CONTINUOUS
Status: DISCONTINUED | OUTPATIENT
Start: 2021-10-03 | End: 2021-10-05

## 2021-10-03 RX ORDER — FUROSEMIDE 40 MG/1
40 TABLET ORAL DAILY
Status: DISCONTINUED | OUTPATIENT
Start: 2021-10-04 | End: 2021-10-03

## 2021-10-03 NOTE — PLAN OF CARE
Pt a&O. More awake today. Forgetful at times. On O2 2 L per Nc. Pt did desat to 87% on room air today.  monitoring maintained. Scds to BLE. Tolerating diet with fair appetite. Pt takes all  meds in applesauce. Chocolate ensure BID . Last BM 10/1/21.  Emanuel pacemaker was placed 9 yrs ago. Angelika Castillo had taken her to see the doctor Friday regarding a rash/redness above pacemaker incision site. . per Angelika Castillo, he was told that the redness is d/t the big microfoam drsg that was in place.  He also wants to make sure that pt

## 2021-10-03 NOTE — CM/SW NOTE
10/03/21 1100   CM/SW Referral Data   Referral Source Physician   Reason for Referral Discharge planning   Informant Patient; Spouse/Significant Other   Patient Info   Patient's Current Mental Status at Time of Assessment Alert; Confused or unable to co

## 2021-10-03 NOTE — CM/SW NOTE
Recevied message from OT that pt is ok to go home with 24 hr cg and HHC- she will need 3-1 commode and hospital bed.   Order will be sent to [facility], [facility] Worker/Discharge Planner  [phone]

## 2021-10-03 NOTE — PROGRESS NOTES
[facility]     Hospitalist Progress Note     Christinemitch Genie Patient Status:  Inpatient    2/3/1932 MRN [id number]   [facility] 3SW-A Attending Heidi Smith MD   [facility] Day # 1 PCP Carlee Low DO     Chief Complaint: s/p fall    Sub in the last 168 hours. Imaging: Imaging data reviewed in Epic.     Medications:   • atorvastatin  20 mg Oral Daily   • rivaroxaban  15 mg Oral Daily with food   • predniSONE  5 mg Oral Daily   • docusate sodium  100 mg Oral BID   • furosemide  20 mg Oral

## 2021-10-03 NOTE — CONSULTS
ORTHOPAEDIC SURGERY CONSULT NOTE    Attending Physician: Shannan Bates  Consulting Physician: Khris Hampton MD    Patient Name: Bryce Sauceda  Age:  80year old  Sex: female  MRN: [id number]  : 2/3/1932  Date of Admission: 10/2/2021    REASON FOR CONSULTATION: superior and inferior pubic rami fracture    - Ok for WBAT with walker  - Pain control per primary  - Likely rehab vs HHC placement  - FU with me in 3 months for new xrays    Lima Pressley MD  [facility]  Orthopedic Surgery, Hand and Upper Extrem

## 2021-10-03 NOTE — PHYSICAL THERAPY NOTE
PHYSICAL THERAPY EVALUATION - INPATIENT     Room Number: 364/364-A  Evaluation Date: 10/3/2021  Type of Evaluation: Initial  Physician Order: PT Eval and Treat    Presenting Problem: R hip pain S/P fall  Reason for Therapy: Mobility Dysfunction and D goal is none stated,  wants patient to return home    OBJECTIVE  Precautions: Bed/chair alarm; Hard of hearing  Fall Risk: High fall risk    WEIGHT BEARING RESTRICTION  Weight Bearing Restriction: R lower extremity; L lower extremity        R Lower minute): 2    AM-PAC '6-Clicks' INPATIENT SHORT FORM - BASIC MOBILITY  How much difficulty does the patient currently have. ..  -   Turning over in bed (including adjusting bedclothes, sheets and blankets)?: A Lot   -   Sitting down on and standing up from need for 24 hr caregiver & HHPT/HHOT if plan is to return home instead of DAVID as  reported that previous DAVID stay did not have a good outcome for patient.   Patient was encouraged to stay sitting up in the chair as tolerated and both  & patien Strengthening; Transfer training  Rehab Potential : Good  Frequency (Obs): 3-5x/week  Number of Visits to Meet Established Goals: 3      CURRENT GOALS    Goal #1 Patient is able to demonstrate supine - sit EOB @ level: minimum assistance     Goal #2 Radha

## 2021-10-03 NOTE — OCCUPATIONAL THERAPY NOTE
OCCUPATIONAL THERAPY EVALUATION - INPATIENT     Room Number: 364/364-A  Evaluation Date: 10/3/2021  Type of Evaluation: Initial  Presenting Problem: Fall resulting in right superior and inferior pubic rami fx    Physician Order: IP Consult to Occupational Patient lives at [place] [address] with . Patient receives assist from a part time caregiver for dressing, toileting, bathing, functional mobility w/ use of walker for short distances.   Patient's  assists with all self-care and functiona engage in ER and c/o pain w/ ~30 deg of shoulder abd right    Upper extremity strength is within functional limits except for the following;  Right Shoulder flexion  3-/5    COORDINATION  Gross Motor    impaired   Fine Motor    impaired     ADDITIONAL TEST Session: Up in chair; Needs met; Call light within reach; RN aware of session/findings; All patient questions and concerns addressed; SCDs in place; Family present; Alarm set;  Discussed recommendations with /    ASSESSMENT     Noemy Clinical Decision Making MODERATE - Analysis of occupational profile, detailed assessments, several treatment options    Overall Complexity MODERATE     OT Discharge Recommendations: 24 hour care/supervision; Home with home health PT/OT  OT Device Recomm

## 2021-10-03 NOTE — PROGRESS NOTES
Pt's spouse states pt has increased weakness and decreased ROM in right arm. Informed Morriston Punches that I will call MD to request some xrays. Pt worked with therapy and they are recommending right shoulder imaging as well. Pt has not voided this shift.  Last ou

## 2021-10-03 NOTE — PROGRESS NOTES
Pt c/o more pain in left shoulder. Xrays done. Results: High-riding humeral head in relation to the glenoid.  Findings consistent with rotator cuff impingement. No acute fracture or dislocation.  There is AC joint hypertrophy with lateral downsloping of

## 2021-10-03 NOTE — CM/SW NOTE
Patient requires a semi-electric hospital bed at home due to pain from pelvic fracture  that is not able to be alleviated in an ordinary bed and requires frequent and/or immediate changes in body position.

## 2021-10-03 NOTE — PLAN OF CARE
Admitted for pelvic fracture. Patient is alert to self and situation, also forgetful. Requires full assistance with feeds. Medication given with applesauce, tolerated well. Generalized bruising.  Left chest pacemaker site with steri strips, dry/clean/intact

## 2021-10-04 PROCEDURE — 99232 SBSQ HOSP IP/OBS MODERATE 35: CPT | Performed by: HOSPITALIST

## 2021-10-04 NOTE — PLAN OF CARE
Alert and oriented. ,O2at 2l/nc,would desat to 88-89% on room air. IV SITE LEAKING,changed per protocol. Voids per purewick,adequate amount. Scattered bruising in her arms noted. Able to swallow medications fairly well. Safety measures reinforced,call light k

## 2021-10-04 NOTE — HOME CARE LIAISON
Patient's  [facility] from [place], patient choice is [address]. Agency reserved in [place] and contact information placed on AVS.   Financial interest disclosure provided to patient. SW [place] updated.

## 2021-10-04 NOTE — PLAN OF CARE
Aox4 but slightly forgetful, on 2L O2 to wean down, had difficulty taking medications today, voiding per abril,  at bedside and  updated them on POC, PT/OT to see, plan for dc with HH, pain controled with PO pain medication, will con

## 2021-10-04 NOTE — PROGRESS NOTES
[facility]     Hospitalist Progress Note     Balaji Stephen Patient Status:  Inpatient    2/3/1932 MRN [id number]   [facility] 3SW-A Attending Franny Tariq MD   [address] Day # 2 PCP Jenniffer Escudero DO     Chief Complaint: s/p fall    Sub 06/10/2020       Pro-Calcitonin  No results for input(s): PCT in the last 168 hours. Cardiac  No results for input(s): TROP, PBNP in the last 168 hours. Creatinine Kinase  No results for input(s): CK in the last 168 hours.     Inflammatory Markers  No

## 2021-10-04 NOTE — CM/SW NOTE
Spoke with PT/OT who stated pt did not do well with them today. DAVID is now recommended. Met with pt's  at bedside to discuss DC Planning. Pt's  was present for therapy session and is concerned he cannot care for pt's needs at home.   He wou

## 2021-10-04 NOTE — CM/SW NOTE
Spoke with Tuan Carvalho from [address] [place] [address] regarding referral for hospital bed and commode. Pagemerari OLMOS for co-sign on SW progress note from 10/3 with verbiage for DME. Met with pt's  at bedside to review DC plan for home with [facility] and DME.   Pt accepted by [facility]

## 2021-10-04 NOTE — OCCUPATIONAL THERAPY NOTE
OCCUPATIONAL THERAPY TREATMENT NOTE - INPATIENT     Room Number: 364/364-A  Session: 1   Number of Visits to Meet Established Goals: 7    Presenting Problem: Fall resulting in right superior and inferior pubic rami fx    History related to current admissio SATURATIONS  Oxygen Therapy  SPO2% on Room Air at Rest: 91    [facility] ‘6-Clicks’ Inpatient Daily Activity Short Form  How much help from another person does the patient currently need…  -   Putting on and taking off re current AMPA scores and as patient not progressing as quickly as expected, discharge rec changed to DAVID. If patient declines DAVID, will require 2-person assist, HHPT/OT and stephanie lift; already have hospital bed and commode.  Patient would benefit from lisa

## 2021-10-04 NOTE — CM/SW NOTE
Spoke with Lucas Fang from [address] [place] [address] who confirmed DME to be delivered today by 4:30pm.  Updated pt's RN. Anticipate DC later today once DME delivered. / to remain available for support and/or discharge planning.      Devorah Maddox, LCS

## 2021-10-04 NOTE — PLAN OF CARE
Seen by PT/OT , DAVID recommended, discharge held for today, referrals sent out to rehab facilities per [facility], spouse updated on plan of care, fall precautions maintained,family updated on plan of care per spouse.

## 2021-10-04 NOTE — PHYSICAL THERAPY NOTE
PHYSICAL THERAPY TREATMENT NOTE - INPATIENT    Room Number: 364/364-A     Session: 1     Number of Visits to Meet Established Goals: 3    Presenting Problem: R hip pain S/P fall    History related to current admission: Patient is a 81y/o female admitted Rating: Unable to rate (mild)  Location: R hip and pelvis; and worst c/o pain in R shoulder. Management Techniques: Repositioning; Activity promotion;  Other (Comment) (coordinate w/pain meds; ice)    BALANCE t/f tech's. Heel slides in supine, and AP's prior to functional activity. Mobility as indicated above. Pt semi-guarding RUE today throughout tx, and nearly unable to use it.      Pt demonstrating some confusion with commands for reaching with RU within reach; RN aware of session/findings; All patient questions and concerns addressed; Alarm set;  Family present ( present)    ASSESSMENT     Pt demonstrating significant decline in functional mobility today compared to yesterday, and noting poss

## 2021-10-05 ENCOUNTER — EXTERNAL FACILITY (OUTPATIENT)
Dept: FAMILY MEDICINE CLINIC | Facility: CLINIC | Age: 86
End: 2021-10-05

## 2021-10-05 VITALS
RESPIRATION RATE: 17 BRPM | OXYGEN SATURATION: 97 % | DIASTOLIC BLOOD PRESSURE: 55 MMHG | BODY MASS INDEX: 23.49 KG/M2 | TEMPERATURE: 98 F | SYSTOLIC BLOOD PRESSURE: 129 MMHG | HEIGHT: 68 IN | WEIGHT: 155 LBS | HEART RATE: 71 BPM
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
## 2021-10-05 DIAGNOSIS — Z86.73 HISTORY OF STROKE: ICD-10-CM

## 2021-10-05 DIAGNOSIS — I48.20 CHRONIC ATRIAL FIBRILLATION (HCC): ICD-10-CM

## 2021-10-05 DIAGNOSIS — S32.9XXA CLOSED NONDISPLACED FRACTURE OF PELVIS, UNSPECIFIED PART OF PELVIS, INITIAL ENCOUNTER (HCC): ICD-10-CM

## 2021-10-05 DIAGNOSIS — I10 ESSENTIAL HYPERTENSION: ICD-10-CM

## 2021-10-05 DIAGNOSIS — M35.3 PMR (POLYMYALGIA RHEUMATICA) (HCC): ICD-10-CM

## 2021-10-05 DIAGNOSIS — D69.6 THROMBOCYTOPENIA (HCC): ICD-10-CM

## 2021-10-05 PROCEDURE — 1111F DSCHRG MED/CURRENT MED MERGE: CPT | Performed by: FAMILY MEDICINE

## 2021-10-05 PROCEDURE — 99239 HOSP IP/OBS DSCHRG MGMT >30: CPT | Performed by: HOSPITALIST

## 2021-10-05 PROCEDURE — 99306 1ST NF CARE HIGH MDM 50: CPT | Performed by: FAMILY MEDICINE

## 2021-10-05 RX ORDER — BISACODYL 10 MG
10 SUPPOSITORY, RECTAL RECTAL
Status: DISCONTINUED | OUTPATIENT
Start: 2021-10-05 | End: 2021-10-05

## 2021-10-05 RX ORDER — HYDROCODONE BITARTRATE AND ACETAMINOPHEN 5; 325 MG/1; MG/1
1 TABLET ORAL EVERY 4 HOURS PRN
Qty: 30 TABLET | Refills: 0 | Status: SHIPPED | OUTPATIENT
Start: 2021-10-05

## 2021-10-05 RX ORDER — FUROSEMIDE 10 MG/ML
40 SOLUTION ORAL ONCE
Status: COMPLETED | OUTPATIENT
Start: 2021-10-05 | End: 2021-10-05

## 2021-10-05 NOTE — PROGRESS NOTES
[facility]     Hospitalist Progress Note     Yolanda Ventura Patient Status:  Inpatient    2/3/1932 MRN [id number]   [facility] 3SW-A Attending Nelly Díaz MD   [facility] Day # 3 PCP Dontrell Escudero DO     Chief Complaint: s/p fall    Sub Detected 10/02/2021    COVID19 Not Detected 06/10/2020       Pro-Calcitonin  No results for input(s): PCT in the last 168 hours. Cardiac  No results for input(s): TROP, PBNP in the last 168 hours.     Creatinine Kinase  No results for input(s): CK in the

## 2021-10-05 NOTE — CM/SW NOTE
List of accepting DAVID facilities given to pt's . He stated plan to tour [employer] and [employer]. Discussed anticipated DC today once facility chosen. / to remain available for support and/or discharge planning.

## 2021-10-05 NOTE — PLAN OF CARE
Report given to Ovidio Magaña at the [facility]. All questions answered. Discharge education given to pt and spouse. Discharge instructions and prescription for norco sent with EMS. Pt discharged via ambulance to the [facility].

## 2021-10-05 NOTE — PLAN OF CARE
Patient A&O X4 on 1L O2- weaning. VSS, . SCDs/xarelto. Voiding via purewick, LBM 10/1- will offer laxative. Takes pills whole with applesauce-tolerated okay. Minimal c/o pain at this time- PO Norco available. WBAT. PT/OT- stephanie lift.  Reminded to use nargis

## 2021-10-06 NOTE — PROGRESS NOTES
Inez Favre Author: Dave Tucker MD     2/3/1932 MRN [id number]   [facility]  Admission 10/2/21      Last Hospital Discharge 10/5/21 PCP Elidia Daily, [address] [facility] of Discharge  [facility]        CC --admitted to [address] [place] [address] from [address] [place] [address] Never      ALLERGIES:    Dabigatran              OTHER (SEE COMMENTS)    Comment:Oral, epistaxis    CODE STATUS:  Full Code    CURRENT MEDICATIONS   Current Outpatient Medications   Medication Sig Dispense Refill   • HYDROcodone-acetaminophen 5-325 MG Oral clear to auscultation  CARDIO: RRR without murmur  GI: good BS's, no masses, HSM or tenderness  : deferred  RECTAL: deferred  MUSCULOSKELETAL: Tenderness onto the pelvic area  EXTREMITIES: no cyanosis, clubbing or edema  NEURO: Oriented times three, cran there are still dictated errors within this office note. If so, please bring any errors to my attention for an addendum.  All efforts were made to ensure that this office note is accurate

## 2021-10-06 NOTE — PLAN OF CARE
Pt is alert and oriented X4 on room air. . Bilateral SCD. Voiding via purewick. Two bowel movement 10/05 after suppository given. Left pacemaker is open to air with steri strips and clean, dry and intact. Pts , son and sons wife at bedside.  Melissa

## 2021-10-07 ENCOUNTER — INITIAL APN SNF VISIT (OUTPATIENT)
Dept: INTERNAL MEDICINE CLINIC | Age: 86
End: 2021-10-07

## 2021-10-07 DIAGNOSIS — Z74.3 REQUIRES CONTINUOUS SUPERVISION FOR ACTIVITIES OF DAILY LIVING (ADL): ICD-10-CM

## 2021-10-07 DIAGNOSIS — S32.9XXD CLOSED NONDISPLACED FRACTURE OF PELVIS WITH ROUTINE HEALING, UNSPECIFIED PART OF PELVIS, SUBSEQUENT ENCOUNTER: Primary | ICD-10-CM

## 2021-10-07 DIAGNOSIS — Z79.899 MEDICATION MANAGEMENT: ICD-10-CM

## 2021-10-07 PROCEDURE — 99310 SBSQ NF CARE HIGH MDM 45: CPT | Performed by: NURSE PRACTITIONER

## 2021-10-08 ENCOUNTER — APPOINTMENT (OUTPATIENT)
Dept: CARDIOLOGY | Age: 86
End: 2021-10-08
Attending: INTERNAL MEDICINE

## 2021-10-08 VITALS
OXYGEN SATURATION: 97 % | DIASTOLIC BLOOD PRESSURE: 57 MMHG | HEART RATE: 70 BPM | SYSTOLIC BLOOD PRESSURE: 156 MMHG | TEMPERATURE: 98 F | RESPIRATION RATE: 17 BRPM

## 2021-10-08 RX ORDER — ACETAMINOPHEN 325 MG/1
650 TABLET ORAL EVERY 4 HOURS PRN
COMMUNITY

## 2021-10-08 NOTE — PROGRESS NOTES
Bryce Sauceda  : 2/3/1932  Age 80year old  female patient is admitted to Facility:  [facility] [address] date:  10/2/21  Discharge date to [facility]: 10/5/2021  ELOS: 14 to 18 days  Anticipated discharge date:  10/21/21  [employer] caregiver at bedside. Discussed goals of care to restore to previous level of function if possible and return home with spouse. Will continue cg assistance and agreeable to [facility] as per therapy recs.     Therapy progress update provided today:  Israel 15 MG Oral Tab Take 15 mg by mouth daily with food. • predniSONE 5 MG Oral Tab Take 5 mg by mouth daily.          VITALS:  /57   Pulse 70   Temp 98.4 °F (36.9 °C)   Resp 17   SpO2 97%      REVIEW OF SYSTEMS:  GENERAL HEALTH:feels well otherwise  S 10/04/2021    BUN 40 (H) 10/04/2021    BUNCREA 20.9 (H) 07/28/2021    CREATSERUM 1.26 (H) 10/04/2021    ANIONGAP 2 10/04/2021    GFR 46 (L) 01/10/2018    GFRNAA 38 (L) 10/04/2021    GFRAA 44 (L) 10/04/2021    CA 8.8 10/04/2021    OSMOCALC 305 (H) 10/04/202 fracture Home  Lidocaine 5% patch daily  Prednisone 5 mg p.o. daily    S/p PPM; CAD with prev CABG, AAA, DL, hx hyperkalemia  VS q shift  Xarelto 15mg po daily  Atorvastatin 20 mg p.o. daily  Lasix 40 mg p.o. daily  Labs weekly in [facility]; more often if clinic

## 2021-10-12 ENCOUNTER — SNF VISIT (OUTPATIENT)
Dept: INTERNAL MEDICINE CLINIC | Age: 86
End: 2021-10-12

## 2021-10-12 DIAGNOSIS — Z74.3 REQUIRES CONTINUOUS SUPERVISION FOR ACTIVITIES OF DAILY LIVING (ADL): Primary | ICD-10-CM

## 2021-10-12 DIAGNOSIS — Z79.899 MEDICATION MANAGEMENT: ICD-10-CM

## 2021-10-12 DIAGNOSIS — S32.9XXD CLOSED NONDISPLACED FRACTURE OF PELVIS WITH ROUTINE HEALING, UNSPECIFIED PART OF PELVIS, SUBSEQUENT ENCOUNTER: ICD-10-CM

## 2021-10-12 PROCEDURE — 99308 SBSQ NF CARE LOW MDM 20: CPT | Performed by: NURSE PRACTITIONER

## 2021-10-18 ENCOUNTER — SNF VISIT (OUTPATIENT)
Dept: INTERNAL MEDICINE CLINIC | Age: 86
End: 2021-10-18

## 2021-10-18 VITALS
OXYGEN SATURATION: 93 % | HEART RATE: 72 BPM | SYSTOLIC BLOOD PRESSURE: 154 MMHG | DIASTOLIC BLOOD PRESSURE: 73 MMHG | TEMPERATURE: 98 F | RESPIRATION RATE: 16 BRPM

## 2021-10-18 VITALS
TEMPERATURE: 97 F | HEART RATE: 72 BPM | RESPIRATION RATE: 18 BRPM | DIASTOLIC BLOOD PRESSURE: 59 MMHG | SYSTOLIC BLOOD PRESSURE: 128 MMHG | OXYGEN SATURATION: 97 %

## 2021-10-18 DIAGNOSIS — Z74.3 REQUIRES CONTINUOUS SUPERVISION FOR ACTIVITIES OF DAILY LIVING (ADL): Primary | ICD-10-CM

## 2021-10-18 DIAGNOSIS — S32.9XXD CLOSED NONDISPLACED FRACTURE OF PELVIS WITH ROUTINE HEALING, UNSPECIFIED PART OF PELVIS, SUBSEQUENT ENCOUNTER: ICD-10-CM

## 2021-10-18 DIAGNOSIS — I10 HYPERTENSION, UNSPECIFIED TYPE: ICD-10-CM

## 2021-10-18 PROCEDURE — 99308 SBSQ NF CARE LOW MDM 20: CPT | Performed by: NURSE PRACTITIONER

## 2021-10-18 RX ORDER — AMLODIPINE BESYLATE 5 MG/1
5 TABLET ORAL DAILY
COMMUNITY

## 2021-10-18 NOTE — PROGRESS NOTES
Tiffany Beck, 33/1932, 80year old, female    Chief Complaint:  Patient presents with:   Follow - Up  Musculoskeletal Problem  Weakness  Medication Follow-Up     [facility] Admit date:  10/2/21  Discharge date to [facility]: 10/5/2021  Advanced Directive: F to auscultation, no wheezing, no acc muscle use  CARDIOVASCULAR: s/p PPM  ABDOMEN: active BS+, soft, nondistended; no visible masses; nontender, no guarding  :Deferred  MUSCULOSKELETAL: weakness r/t recent hospitalization/diagnoses/sequelae; continue the Version  Source [id number] Result Unit Ref.  Range Flag Status  Selvin Hernández ([id number]) Order #: [id number] SourceKey: [id number]  COMPREHENSIVE METABOLIC See  Attachment  Final  CBC W/DIFF AND PLATELETS  WHITE BLOOD CELLS 5.86 THO/mm3 4.80 - 10.80 Fin 0.4 % Final    CBC W/DIFF AND PLATELETS See  Attachment  Final  COMPREHENSIVE METABOLIC  GLUCOSE 79 mg/dL 70 - 110 Final  BUN 29 mg/dL 8 - 28 H Final  CREATININE 0.89 mg/dL 0.40 - 1.60 Final  BILIRUBIN, TOTAL 1.11 mg/dL 0.20 - 1.00 H Final  PROTEIN, TOTAL PCP within 1-2 weeks following DAVID discharge.      Follow-Up with specialists as recommended.     Nick Reddy, CHRISTOPHE  10/18/2021  11:40 AM

## 2021-10-18 NOTE — PROGRESS NOTES
Vivien Kurtz, 33/1932, 80year old, female    Chief Complaint:  Patient presents with:   Follow - Up  Musculoskeletal Problem  Weakness  Medication Follow-Up  Med Reconcilliation     [facility] Admit date:  10/2/21  Discharge date to [facility]: 10/5/2021 drainage, mucous membranes pink, moist  RESPIRATORY:clear to auscultation  CARDIOVASCULAR: s/p PPM  ABDOMEN: active BS+, soft, nondistended; no visible masses; nontender, no guarding  :Deferred  MUSCULOSKELETAL: weakness r/t recent hospitalization/diagno following DAVID discharge.      Follow-Up with specialists as recommended.     Dakota Harper, CHRISTOPHE  10/12/2021  9:00 AM

## 2021-10-25 ENCOUNTER — SNF DISCHARGE (OUTPATIENT)
Dept: INTERNAL MEDICINE CLINIC | Age: 86
End: 2021-10-25

## 2021-10-25 DIAGNOSIS — Z91.81 HISTORY OF FALLING: ICD-10-CM

## 2021-10-25 DIAGNOSIS — Z78.9 DECREASED ACTIVITIES OF DAILY LIVING (ADL): ICD-10-CM

## 2021-10-25 DIAGNOSIS — Z76.0 PRESCRIPTION REFILL: ICD-10-CM

## 2021-10-25 DIAGNOSIS — S32.9XXD CLOSED NONDISPLACED FRACTURE OF PELVIS WITH ROUTINE HEALING, UNSPECIFIED PART OF PELVIS, SUBSEQUENT ENCOUNTER: Primary | ICD-10-CM

## 2021-10-25 PROCEDURE — 99316 NF DSCHRG MGMT 30 MIN+: CPT | Performed by: NURSE PRACTITIONER

## 2021-10-26 VITALS
OXYGEN SATURATION: 97 % | DIASTOLIC BLOOD PRESSURE: 83 MMHG | SYSTOLIC BLOOD PRESSURE: 122 MMHG | HEART RATE: 69 BPM | RESPIRATION RATE: 16 BRPM | TEMPERATURE: 98 F

## 2021-10-26 NOTE — PROGRESS NOTES
Grace Gutiérrez, 33/1932, 80year old, female is being discharged from Facility:  [facility]    DISCHARGE SUMMARY    Date of Admission: 10/5/21  Date of Discharge: 10/28/21                              Admitting Diagnoses:  Fracture of pubic rami, fall, we continue therapies in the home to rehab and improve strength, endurance and independence with ADLs  EXTREMITIES/VASCULAR: no cyanosis, no edema  NEUROLOGIC: follows commands  PSYCHIATRIC: alert and oriented x 3; affect appropriate    Skilled Nursing [facility]

## 2021-10-28 NOTE — DISCHARGE SUMMARY
[facility]  DISCHARGE SUMMARY     Inez Favre Patient Status:  Inpatient    2/3/1932 MRN [id number]   [facility] 3SW-A Attending No att. providers found   Hosp Day # 3 PCP Elidia Daily DO     Date of Admission: 10/2/2021  Date Discharge Medications      CHANGE how you take these medications      Instructions Prescription details   furosemide 20 MG Tabs  Commonly known as: LASIX  What changed: Another medication with the same name was removed.  Continue taking this medication, a [id number]  [phone]    Schedule an appointment as soon as possible for a visit in 1 week      MD Rob Arias [phone]  Feliciano Fallon [phone]    Schedule an appointment as soon as possible for a visit in 3 months  Orthopedi

## 2022-01-05 ENCOUNTER — NURSE ONLY (OUTPATIENT)
Dept: LAB | Age: 87
End: 2022-01-05
Attending: FAMILY MEDICINE
Payer: MEDICARE

## 2022-01-05 DIAGNOSIS — E78.5 HYPERLIPIDEMIA: Primary | ICD-10-CM

## 2022-01-05 LAB
ALBUMIN SERPL-MCNC: 3.2 G/DL (ref 3.4–5)
ALBUMIN/GLOB SERPL: 1 {RATIO} (ref 1–2)
ALP LIVER SERPL-CCNC: 94 U/L
ALT SERPL-CCNC: 16 U/L
ANION GAP SERPL CALC-SCNC: 6 MMOL/L (ref 0–18)
AST SERPL-CCNC: 22 U/L (ref 15–37)
BILIRUB SERPL-MCNC: 0.7 MG/DL (ref 0.1–2)
BUN BLD-MCNC: 26 MG/DL (ref 7–18)
CALCIUM BLD-MCNC: 9.1 MG/DL (ref 8.5–10.1)
CHLORIDE SERPL-SCNC: 104 MMOL/L (ref 98–112)
CHOLEST SERPL-MCNC: 275 MG/DL (ref ?–200)
CO2 SERPL-SCNC: 32 MMOL/L (ref 21–32)
CREAT BLD-MCNC: 1.09 MG/DL
FASTING PATIENT LIPID ANSWER: YES
FASTING STATUS PATIENT QL REPORTED: YES
GLOBULIN PLAS-MCNC: 3.2 G/DL (ref 2.8–4.4)
GLUCOSE BLD-MCNC: 80 MG/DL (ref 70–99)
HDLC SERPL-MCNC: 68 MG/DL (ref 40–59)
LDLC SERPL CALC-MCNC: 187 MG/DL (ref ?–100)
NONHDLC SERPL-MCNC: 207 MG/DL (ref ?–130)
OSMOLALITY SERPL CALC.SUM OF ELEC: 298 MOSM/KG (ref 275–295)
POTASSIUM SERPL-SCNC: 3.9 MMOL/L (ref 3.5–5.1)
PROT SERPL-MCNC: 6.4 G/DL (ref 6.4–8.2)
SODIUM SERPL-SCNC: 142 MMOL/L (ref 136–145)
TRIGL SERPL-MCNC: 116 MG/DL (ref 30–149)
VLDLC SERPL CALC-MCNC: 24 MG/DL (ref 0–30)

## 2022-01-05 PROCEDURE — 36415 COLL VENOUS BLD VENIPUNCTURE: CPT

## 2022-01-05 PROCEDURE — 80053 COMPREHEN METABOLIC PANEL: CPT

## 2022-01-05 PROCEDURE — 80061 LIPID PANEL: CPT

## 2022-01-06 ENCOUNTER — ANCILLARY PROCEDURE (OUTPATIENT)
Dept: CARDIOLOGY | Age: 87
End: 2022-01-06
Attending: INTERNAL MEDICINE

## 2022-01-06 DIAGNOSIS — Z95.0 CARDIAC PACEMAKER IN SITU: ICD-10-CM

## 2022-04-14 ENCOUNTER — ANCILLARY PROCEDURE (OUTPATIENT)
Dept: CARDIOLOGY | Age: 87
End: 2022-04-14
Attending: INTERNAL MEDICINE

## 2022-04-14 DIAGNOSIS — Z95.0 CARDIAC PACEMAKER IN SITU: ICD-10-CM

## 2022-04-15 PROCEDURE — 93294 REM INTERROG EVL PM/LDLS PM: CPT | Performed by: INTERNAL MEDICINE

## 2022-07-06 ENCOUNTER — NURSE ONLY (OUTPATIENT)
Dept: LAB | Age: 87
End: 2022-07-06
Attending: FAMILY MEDICINE
Payer: MEDICARE

## 2022-07-06 DIAGNOSIS — R60.0 LEG EDEMA: Primary | ICD-10-CM

## 2022-07-06 LAB
ALBUMIN SERPL-MCNC: 3.1 G/DL (ref 3.4–5)
ALBUMIN/GLOB SERPL: 0.8 {RATIO} (ref 1–2)
ALP LIVER SERPL-CCNC: 84 U/L
ALT SERPL-CCNC: 17 U/L
ANION GAP SERPL CALC-SCNC: 6 MMOL/L (ref 0–18)
AST SERPL-CCNC: 23 U/L (ref 15–37)
BILIRUB SERPL-MCNC: 0.9 MG/DL (ref 0.1–2)
BUN BLD-MCNC: 27 MG/DL (ref 7–18)
CALCIUM BLD-MCNC: 9.3 MG/DL (ref 8.5–10.1)
CHLORIDE SERPL-SCNC: 106 MMOL/L (ref 98–112)
CO2 SERPL-SCNC: 31 MMOL/L (ref 21–32)
CREAT BLD-MCNC: 1.17 MG/DL
FASTING STATUS PATIENT QL REPORTED: YES
GLOBULIN PLAS-MCNC: 3.7 G/DL (ref 2.8–4.4)
GLUCOSE BLD-MCNC: 124 MG/DL (ref 70–99)
OSMOLALITY SERPL CALC.SUM OF ELEC: 303 MOSM/KG (ref 275–295)
POTASSIUM SERPL-SCNC: 3.4 MMOL/L (ref 3.5–5.1)
PROT SERPL-MCNC: 6.8 G/DL (ref 6.4–8.2)
SODIUM SERPL-SCNC: 143 MMOL/L (ref 136–145)

## 2022-07-06 PROCEDURE — 80053 COMPREHEN METABOLIC PANEL: CPT

## 2022-07-06 PROCEDURE — 36415 COLL VENOUS BLD VENIPUNCTURE: CPT

## 2022-07-21 ENCOUNTER — ANCILLARY PROCEDURE (OUTPATIENT)
Dept: CARDIOLOGY | Age: 87
End: 2022-07-21
Attending: INTERNAL MEDICINE

## 2022-07-21 ENCOUNTER — TELEPHONE (OUTPATIENT)
Dept: CARDIOLOGY | Age: 87
End: 2022-07-21

## 2022-07-21 DIAGNOSIS — Z95.0 CARDIAC PACEMAKER IN SITU: ICD-10-CM

## 2022-07-22 PROCEDURE — 93294 REM INTERROG EVL PM/LDLS PM: CPT | Performed by: INTERNAL MEDICINE

## 2022-09-28 ENCOUNTER — LAB REQUISITION (OUTPATIENT)
Dept: LAB | Facility: HOSPITAL | Age: 87
End: 2022-09-28
Payer: MEDICARE

## 2022-09-28 DIAGNOSIS — I25.9 CHRONIC ISCHEMIC HEART DISEASE, UNSPECIFIED: ICD-10-CM

## 2022-09-28 LAB
ALBUMIN SERPL-MCNC: 3.2 G/DL (ref 3.4–5)
ALBUMIN/GLOB SERPL: 0.9 {RATIO} (ref 1–2)
ALP LIVER SERPL-CCNC: 79 U/L
ALT SERPL-CCNC: 20 U/L
ANION GAP SERPL CALC-SCNC: 6 MMOL/L (ref 0–18)
AST SERPL-CCNC: 21 U/L (ref 15–37)
BILIRUB SERPL-MCNC: 0.9 MG/DL (ref 0.1–2)
BUN BLD-MCNC: 33 MG/DL (ref 7–18)
CALCIUM BLD-MCNC: 9.4 MG/DL (ref 8.5–10.1)
CHLORIDE SERPL-SCNC: 107 MMOL/L (ref 98–112)
CO2 SERPL-SCNC: 31 MMOL/L (ref 21–32)
CREAT BLD-MCNC: 1.19 MG/DL
GFR SERPLBLD BASED ON 1.73 SQ M-ARVRAT: 43 ML/MIN/1.73M2 (ref 60–?)
GLOBULIN PLAS-MCNC: 3.7 G/DL (ref 2.8–4.4)
GLUCOSE BLD-MCNC: 97 MG/DL (ref 70–99)
OSMOLALITY SERPL CALC.SUM OF ELEC: 305 MOSM/KG (ref 275–295)
POTASSIUM SERPL-SCNC: 3.3 MMOL/L (ref 3.5–5.1)
PROT SERPL-MCNC: 6.9 G/DL (ref 6.4–8.2)
SODIUM SERPL-SCNC: 144 MMOL/L (ref 136–145)

## 2022-09-28 PROCEDURE — 80053 COMPREHEN METABOLIC PANEL: CPT | Performed by: FAMILY MEDICINE

## 2022-10-27 ENCOUNTER — ANCILLARY PROCEDURE (OUTPATIENT)
Dept: CARDIOLOGY | Age: 87
End: 2022-10-27
Attending: INTERNAL MEDICINE

## 2022-10-27 DIAGNOSIS — Z95.0 PACEMAKER: ICD-10-CM

## 2022-11-25 PROBLEM — Z98.890 S/P AV NODAL ABLATION: Status: RESOLVED | Noted: 2019-12-03 | Resolved: 2022-11-25

## 2022-11-25 PROBLEM — Z45.010 PACEMAKER BATTERY DEPLETION: Status: RESOLVED | Noted: 2021-09-10 | Resolved: 2022-11-25

## 2022-12-01 ENCOUNTER — OFFICE VISIT (OUTPATIENT)
Dept: CARDIOLOGY | Age: 87
End: 2022-12-01

## 2022-12-01 ENCOUNTER — ANCILLARY PROCEDURE (OUTPATIENT)
Dept: CARDIOLOGY | Age: 87
End: 2022-12-01
Attending: INTERNAL MEDICINE

## 2022-12-01 VITALS
DIASTOLIC BLOOD PRESSURE: 77 MMHG | BODY MASS INDEX: 25.02 KG/M2 | SYSTOLIC BLOOD PRESSURE: 146 MMHG | OXYGEN SATURATION: 100 % | HEART RATE: 70 BPM | HEIGHT: 66 IN

## 2022-12-01 VITALS — HEART RATE: 70 BPM

## 2022-12-01 DIAGNOSIS — I97.190 COMPLETE AV BLOCK DUE TO AV NODAL ABLATION (CMD): ICD-10-CM

## 2022-12-01 DIAGNOSIS — I08.0 MILD MITRAL AND AORTIC REGURGITATION: ICD-10-CM

## 2022-12-01 DIAGNOSIS — Z98.890 HISTORY OF MITRAL VALVE REPAIR: ICD-10-CM

## 2022-12-01 DIAGNOSIS — I48.20 ATRIAL FIBRILLATION, CHRONIC (CMD): ICD-10-CM

## 2022-12-01 DIAGNOSIS — Z79.01 LONG TERM (CURRENT) USE OF ANTICOAGULANTS: ICD-10-CM

## 2022-12-01 DIAGNOSIS — Z95.3 HISTORY OF AORTIC VALVE REPLACEMENT WITH BIOPROSTHETIC VALVE: ICD-10-CM

## 2022-12-01 DIAGNOSIS — Z95.0 PACEMAKER: Primary | ICD-10-CM

## 2022-12-01 DIAGNOSIS — Z95.0 CARDIAC PACEMAKER IN SITU: ICD-10-CM

## 2022-12-01 DIAGNOSIS — I44.2 COMPLETE AV BLOCK DUE TO AV NODAL ABLATION (CMD): ICD-10-CM

## 2022-12-01 LAB
MDC_IDC_LEAD_IMPLANT_DT: NORMAL
MDC_IDC_LEAD_IMPLANT_DT: NORMAL
MDC_IDC_LEAD_LOCATION: NORMAL
MDC_IDC_LEAD_LOCATION: NORMAL
MDC_IDC_LEAD_LOCATION_DETAIL_1: NORMAL
MDC_IDC_LEAD_LOCATION_DETAIL_1: NORMAL
MDC_IDC_LEAD_MFG: NORMAL
MDC_IDC_LEAD_MFG: NORMAL
MDC_IDC_LEAD_MODEL: NORMAL
MDC_IDC_LEAD_MODEL: NORMAL
MDC_IDC_LEAD_POLARITY_TYPE: NORMAL
MDC_IDC_LEAD_POLARITY_TYPE: NORMAL
MDC_IDC_LEAD_SERIAL: NORMAL
MDC_IDC_LEAD_SERIAL: NORMAL
MDC_IDC_MSMT_BATTERY_DTM: NORMAL
MDC_IDC_MSMT_BATTERY_REMAINING_LONGEVITY: 122 MO
MDC_IDC_MSMT_BATTERY_STATUS: NORMAL
MDC_IDC_MSMT_BATTERY_VOLTAGE: 3.02 V
MDC_IDC_MSMT_LEADCHNL_RV_IMPEDANCE_VALUE: 550 OHM
MDC_IDC_MSMT_LEADCHNL_RV_IMPEDANCE_VALUE: 550 OHM
MDC_IDC_MSMT_LEADCHNL_RV_PACING_THRESHOLD_AMPLITUDE: 0.62 V
MDC_IDC_MSMT_LEADCHNL_RV_PACING_THRESHOLD_PULSEWIDTH: 0.5 MS
MDC_IDC_PG_IMPLANT_DTM: NORMAL
MDC_IDC_PG_MFG: NORMAL
MDC_IDC_PG_MODEL: NORMAL
MDC_IDC_PG_SERIAL: NORMAL
MDC_IDC_PG_TYPE: NORMAL
MDC_IDC_SESS_CLINIC_NAME: NORMAL
MDC_IDC_SESS_DTM: NORMAL
MDC_IDC_SESS_TYPE: NORMAL
MDC_IDC_SET_BRADY_HYSTRATE: NORMAL
MDC_IDC_SET_BRADY_LOWRATE: 70 {BEATS}/MIN
MDC_IDC_SET_BRADY_MAX_SENSOR_RATE: 120 {BEATS}/MIN
MDC_IDC_SET_BRADY_MODE: NORMAL
MDC_IDC_SET_BRADY_NIGHT_RATE: NORMAL
MDC_IDC_SET_LEADCHNL_RA_PACING_POLARITY: NORMAL
MDC_IDC_SET_LEADCHNL_RA_SENSING_POLARITY: NORMAL
MDC_IDC_SET_LEADCHNL_RV_PACING_AMPLITUDE: 0.88
MDC_IDC_SET_LEADCHNL_RV_PACING_CAPTURE_MODE: NORMAL
MDC_IDC_SET_LEADCHNL_RV_PACING_POLARITY: NORMAL
MDC_IDC_SET_LEADCHNL_RV_PACING_PULSEWIDTH: 0.5 MS
MDC_IDC_SET_LEADCHNL_RV_SENSING_POLARITY: NORMAL
MDC_IDC_SET_LEADCHNL_RV_SENSING_SENSITIVITY: 2 MV
MDC_IDC_STAT_AT_MODE_SW_COUNT: 0
MDC_IDC_STAT_BRADY_RA_PERCENT_PACED: 0 %
MDC_IDC_STAT_BRADY_RV_PERCENT_PACED: 99.57 %

## 2022-12-01 PROCEDURE — 99213 OFFICE O/P EST LOW 20 MIN: CPT | Performed by: INTERNAL MEDICINE

## 2022-12-01 PROCEDURE — 93279 PRGRMG DEV EVAL PM/LDLS PM: CPT | Performed by: INTERNAL MEDICINE

## 2022-12-01 ASSESSMENT — ENCOUNTER SYMPTOMS
NEAR-SYNCOPE: 0
ALLERGIC/IMMUNOLOGIC COMMENTS: NO NEW FOOD ALLERGIES
CHILLS: 0
COUGH: 0
HEMOPTYSIS: 0
DOUBLE VISION: 0
HEMATOCHEZIA: 0
SYNCOPE: 0
WEIGHT LOSS: 0
LIGHT-HEADEDNESS: 0
SHORTNESS OF BREATH: 0
DEPRESSION: 1
WEIGHT GAIN: 0
WEAKNESS: 1
FEVER: 0
BRUISES/BLEEDS EASILY: 0
SUSPICIOUS LESIONS: 0
DIZZINESS: 0

## 2023-01-11 ENCOUNTER — LAB REQUISITION (OUTPATIENT)
Dept: LAB | Facility: HOSPITAL | Age: 88
End: 2023-01-11
Payer: MEDICARE

## 2023-01-11 DIAGNOSIS — N18.9 CHRONIC KIDNEY DISEASE, UNSPECIFIED: ICD-10-CM

## 2023-01-11 LAB
ALBUMIN SERPL-MCNC: 3 G/DL (ref 3.4–5)
ALBUMIN/GLOB SERPL: 0.8 {RATIO} (ref 1–2)
ALP LIVER SERPL-CCNC: 77 U/L
ALT SERPL-CCNC: 15 U/L
ANION GAP SERPL CALC-SCNC: 6 MMOL/L (ref 0–18)
AST SERPL-CCNC: 23 U/L (ref 15–37)
BILIRUB SERPL-MCNC: 0.7 MG/DL (ref 0.1–2)
BUN BLD-MCNC: 28 MG/DL (ref 7–18)
CALCIUM BLD-MCNC: 9 MG/DL (ref 8.5–10.1)
CHLORIDE SERPL-SCNC: 108 MMOL/L (ref 98–112)
CO2 SERPL-SCNC: 32 MMOL/L (ref 21–32)
CREAT BLD-MCNC: 1.21 MG/DL
GFR SERPLBLD BASED ON 1.73 SQ M-ARVRAT: 43 ML/MIN/1.73M2 (ref 60–?)
GLOBULIN PLAS-MCNC: 3.6 G/DL (ref 2.8–4.4)
GLUCOSE BLD-MCNC: 94 MG/DL (ref 70–99)
OSMOLALITY SERPL CALC.SUM OF ELEC: 307 MOSM/KG (ref 275–295)
POTASSIUM SERPL-SCNC: 3.6 MMOL/L (ref 3.5–5.1)
PROT SERPL-MCNC: 6.6 G/DL (ref 6.4–8.2)
SODIUM SERPL-SCNC: 146 MMOL/L (ref 136–145)

## 2023-01-11 PROCEDURE — 80053 COMPREHEN METABOLIC PANEL: CPT | Performed by: FAMILY MEDICINE

## 2023-02-02 ENCOUNTER — ANCILLARY PROCEDURE (OUTPATIENT)
Dept: CARDIOLOGY | Age: 88
End: 2023-02-02
Attending: INTERNAL MEDICINE

## 2023-02-02 DIAGNOSIS — Z95.0 CARDIAC PACEMAKER IN SITU: ICD-10-CM

## 2023-02-02 LAB
MDC_IDC_LEAD_IMPLANT_DT: NORMAL
MDC_IDC_LEAD_IMPLANT_DT: NORMAL
MDC_IDC_LEAD_LOCATION: NORMAL
MDC_IDC_LEAD_LOCATION: NORMAL
MDC_IDC_LEAD_LOCATION_DETAIL_1: NORMAL
MDC_IDC_LEAD_LOCATION_DETAIL_1: NORMAL
MDC_IDC_LEAD_MFG: NORMAL
MDC_IDC_LEAD_MFG: NORMAL
MDC_IDC_LEAD_MODEL: NORMAL
MDC_IDC_LEAD_MODEL: NORMAL
MDC_IDC_LEAD_POLARITY_TYPE: NORMAL
MDC_IDC_LEAD_POLARITY_TYPE: NORMAL
MDC_IDC_LEAD_SERIAL: NORMAL
MDC_IDC_LEAD_SERIAL: NORMAL
MDC_IDC_PG_IMPLANT_DTM: NORMAL
MDC_IDC_PG_MFG: NORMAL
MDC_IDC_PG_MODEL: NORMAL
MDC_IDC_PG_SERIAL: NORMAL
MDC_IDC_PG_TYPE: NORMAL
MDC_IDC_SESS_CLINIC_NAME: NORMAL
MDC_IDC_SESS_TYPE: NORMAL

## 2023-02-02 PROCEDURE — 93294 REM INTERROG EVL PM/LDLS PM: CPT | Performed by: INTERNAL MEDICINE

## 2023-02-08 ENCOUNTER — LAB REQUISITION (OUTPATIENT)
Dept: LAB | Facility: HOSPITAL | Age: 88
End: 2023-02-08
Payer: MEDICARE

## 2023-02-08 DIAGNOSIS — I87.2 VENOUS INSUFFICIENCY (CHRONIC) (PERIPHERAL): ICD-10-CM

## 2023-02-08 LAB
ALBUMIN SERPL-MCNC: 2.9 G/DL (ref 3.4–5)
ALBUMIN/GLOB SERPL: 0.8 {RATIO} (ref 1–2)
ALP LIVER SERPL-CCNC: 74 U/L
ALT SERPL-CCNC: 20 U/L
ANION GAP SERPL CALC-SCNC: 4 MMOL/L (ref 0–18)
AST SERPL-CCNC: 27 U/L (ref 15–37)
BILIRUB SERPL-MCNC: 0.7 MG/DL (ref 0.1–2)
BUN BLD-MCNC: 29 MG/DL (ref 7–18)
CALCIUM BLD-MCNC: 9.1 MG/DL (ref 8.5–10.1)
CHLORIDE SERPL-SCNC: 106 MMOL/L (ref 98–112)
CO2 SERPL-SCNC: 30 MMOL/L (ref 21–32)
CREAT BLD-MCNC: 1.14 MG/DL
GFR SERPLBLD BASED ON 1.73 SQ M-ARVRAT: 45 ML/MIN/1.73M2 (ref 60–?)
GLOBULIN PLAS-MCNC: 3.6 G/DL (ref 2.8–4.4)
GLUCOSE BLD-MCNC: 148 MG/DL (ref 70–99)
OSMOLALITY SERPL CALC.SUM OF ELEC: 299 MOSM/KG (ref 275–295)
POTASSIUM SERPL-SCNC: 4.1 MMOL/L (ref 3.5–5.1)
PROT SERPL-MCNC: 6.5 G/DL (ref 6.4–8.2)
SODIUM SERPL-SCNC: 140 MMOL/L (ref 136–145)

## 2023-02-08 PROCEDURE — 80053 COMPREHEN METABOLIC PANEL: CPT | Performed by: FAMILY MEDICINE

## 2023-03-17 NOTE — LETTER
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, last drink was around 6AM today \" had \"4 shots of abby\" daily drinker, drinks \" at least 8 shooters or more\" Unsure if he had seizures in the past, denies SI     Triage Assessment     Row Name 03/17/23 2198       Triage Assessment (Adult)    Airway WDL WDL       Respiratory WDL    Respiratory WDL WDL       Skin Circulation/Temperature WDL    Skin Circulation/Temperature WDL WDL       Cardiac WDL    Cardiac WDL WDL       Peripheral/Neurovascular WDL    Peripheral Neurovascular WDL WDL       Cognitive/Neuro/Behavioral WDL    Cognitive/Neuro/Behavioral WDL WDL              " Monica Bland [address]  [address] [place] [address], [address] [place] [address]    Consent for Operation  Date: __________________                                Time: _______________    1.  I authorize the performance upon Triston Dunn the following operation:  Procedure(s): procedure has been videotaped, the surgeon will obtain the original videotape. The hospital will not be responsible for storage or maintenance of this tape.   7. For the purpose of advancing medical education, I consent to the admittance of observers to the STATEMENTS REQUIRING INSERTION OR COMPLETION WERE FILLED IN.     Signature of Patient:   ___________________________    When the patient is a minor or mentally incompetent to give consent:  Signature of person authorized to consent for patient: ____________ drugs/illegal medications). Failure to inform my anesthesiologist about these medicines may increase my risk of anesthetic complications. iv. If I am allergic to anything or have had a reaction to anesthesia before.   3. I understand how the anesthesia med I have discussed the procedure and information above with the patient (or patient’s representative) and answered their questions. The patient or their representative has agreed to have anesthesia services.     _______________________________________________

## 2023-03-29 ENCOUNTER — LAB REQUISITION (OUTPATIENT)
Dept: LAB | Facility: HOSPITAL | Age: 88
End: 2023-03-29
Payer: MEDICARE

## 2023-03-29 DIAGNOSIS — R60.9 EDEMA, UNSPECIFIED: ICD-10-CM

## 2023-03-29 LAB
ALBUMIN SERPL-MCNC: 3.2 G/DL (ref 3.4–5)
ALBUMIN/GLOB SERPL: 0.9 {RATIO} (ref 1–2)
ALP LIVER SERPL-CCNC: 74 U/L
ALT SERPL-CCNC: 20 U/L
ANION GAP SERPL CALC-SCNC: 2 MMOL/L (ref 0–18)
AST SERPL-CCNC: 24 U/L (ref 15–37)
BILIRUB SERPL-MCNC: 0.6 MG/DL (ref 0.1–2)
BUN BLD-MCNC: 31 MG/DL (ref 7–18)
CALCIUM BLD-MCNC: 9.2 MG/DL (ref 8.5–10.1)
CHLORIDE SERPL-SCNC: 108 MMOL/L (ref 98–112)
CO2 SERPL-SCNC: 32 MMOL/L (ref 21–32)
CREAT BLD-MCNC: 1.3 MG/DL
GFR SERPLBLD BASED ON 1.73 SQ M-ARVRAT: 39 ML/MIN/1.73M2 (ref 60–?)
GLOBULIN PLAS-MCNC: 3.4 G/DL (ref 2.8–4.4)
GLUCOSE BLD-MCNC: 120 MG/DL (ref 70–99)
OSMOLALITY SERPL CALC.SUM OF ELEC: 302 MOSM/KG (ref 275–295)
POTASSIUM SERPL-SCNC: 3.6 MMOL/L (ref 3.5–5.1)
PROT SERPL-MCNC: 6.6 G/DL (ref 6.4–8.2)
SODIUM SERPL-SCNC: 142 MMOL/L (ref 136–145)

## 2023-03-29 PROCEDURE — 80053 COMPREHEN METABOLIC PANEL: CPT | Performed by: FAMILY MEDICINE

## 2023-05-12 ENCOUNTER — ANCILLARY PROCEDURE (OUTPATIENT)
Dept: CARDIOLOGY | Age: 88
End: 2023-05-12
Attending: INTERNAL MEDICINE

## 2023-05-12 DIAGNOSIS — Z95.0 CARDIAC PACEMAKER IN SITU: ICD-10-CM

## 2023-08-17 ENCOUNTER — ANCILLARY PROCEDURE (OUTPATIENT)
Dept: CARDIOLOGY | Age: 88
End: 2023-08-17
Attending: INTERNAL MEDICINE

## 2023-08-17 ENCOUNTER — APPOINTMENT (OUTPATIENT)
Dept: GENERAL RADIOLOGY | Facility: HOSPITAL | Age: 88
End: 2023-08-17
Attending: STUDENT IN AN ORGANIZED HEALTH CARE EDUCATION/TRAINING PROGRAM
Payer: MEDICARE

## 2023-08-17 ENCOUNTER — APPOINTMENT (OUTPATIENT)
Dept: CT IMAGING | Facility: HOSPITAL | Age: 88
End: 2023-08-17
Attending: STUDENT IN AN ORGANIZED HEALTH CARE EDUCATION/TRAINING PROGRAM
Payer: MEDICARE

## 2023-08-17 ENCOUNTER — HOSPITAL ENCOUNTER (INPATIENT)
Facility: HOSPITAL | Age: 88
LOS: 2 days | Discharge: HOME HEALTH | End: 2023-08-20
Attending: STUDENT IN AN ORGANIZED HEALTH CARE EDUCATION/TRAINING PROGRAM | Admitting: HOSPITALIST
Payer: MEDICARE

## 2023-08-17 DIAGNOSIS — A41.9 SEPSIS DUE TO UNDETERMINED ORGANISM (HCC): Primary | ICD-10-CM

## 2023-08-17 DIAGNOSIS — Z95.0 CARDIAC PACEMAKER IN SITU: ICD-10-CM

## 2023-08-17 DIAGNOSIS — G93.49 ENCEPHALOPATHY DUE TO INFECTION: ICD-10-CM

## 2023-08-17 DIAGNOSIS — B99.9 ENCEPHALOPATHY DUE TO INFECTION: ICD-10-CM

## 2023-08-17 DIAGNOSIS — R53.1 WEAKNESS GENERALIZED: ICD-10-CM

## 2023-08-17 DIAGNOSIS — R41.0 CONFUSION: ICD-10-CM

## 2023-08-17 LAB
ALBUMIN SERPL-MCNC: 3.4 G/DL (ref 3.4–5)
ALBUMIN/GLOB SERPL: 0.9 {RATIO} (ref 1–2)
ALP LIVER SERPL-CCNC: 79 U/L
ALT SERPL-CCNC: 22 U/L
ANION GAP SERPL CALC-SCNC: 8 MMOL/L (ref 0–18)
APTT PPP: 29.7 SECONDS (ref 23.3–35.6)
AST SERPL-CCNC: 30 U/L (ref 15–37)
BASOPHILS # BLD AUTO: 0.03 X10(3) UL (ref 0–0.2)
BASOPHILS NFR BLD AUTO: 0.3 %
BILIRUB SERPL-MCNC: 0.9 MG/DL (ref 0.1–2)
BILIRUB UR QL STRIP.AUTO: NEGATIVE
BUN BLD-MCNC: 31 MG/DL (ref 7–18)
CALCIUM BLD-MCNC: 9.2 MG/DL (ref 8.5–10.1)
CHLORIDE SERPL-SCNC: 103 MMOL/L (ref 98–112)
CLARITY UR REFRACT.AUTO: CLEAR
CO2 SERPL-SCNC: 27 MMOL/L (ref 21–32)
CREAT BLD-MCNC: 1.27 MG/DL
EGFRCR SERPLBLD CKD-EPI 2021: 40 ML/MIN/1.73M2 (ref 60–?)
EOSINOPHIL # BLD AUTO: 0.01 X10(3) UL (ref 0–0.7)
EOSINOPHIL NFR BLD AUTO: 0.1 %
ERYTHROCYTE [DISTWIDTH] IN BLOOD BY AUTOMATED COUNT: 15 %
GLOBULIN PLAS-MCNC: 3.9 G/DL (ref 2.8–4.4)
GLUCOSE BLD-MCNC: 135 MG/DL (ref 70–99)
GLUCOSE BLD-MCNC: 138 MG/DL (ref 70–99)
GLUCOSE UR STRIP.AUTO-MCNC: NORMAL MG/DL
HCT VFR BLD AUTO: 44.1 %
HGB BLD-MCNC: 14.3 G/DL
HYALINE CASTS #/AREA URNS AUTO: PRESENT /LPF
IMM GRANULOCYTES # BLD AUTO: 0.02 X10(3) UL (ref 0–1)
IMM GRANULOCYTES NFR BLD: 0.2 %
INR BLD: 1.7 (ref 0.85–1.16)
KETONES UR STRIP.AUTO-MCNC: NEGATIVE MG/DL
LACTATE SERPL-SCNC: 1.9 MMOL/L (ref 0.4–2)
LACTATE SERPL-SCNC: 2.5 MMOL/L (ref 0.4–2)
LEUKOCYTE ESTERASE UR QL STRIP.AUTO: NEGATIVE
LYMPHOCYTES # BLD AUTO: 0.8 X10(3) UL (ref 1–4)
LYMPHOCYTES NFR BLD AUTO: 8.4 %
MCH RBC QN AUTO: 30.9 PG (ref 26–34)
MCHC RBC AUTO-ENTMCNC: 32.4 G/DL (ref 31–37)
MCV RBC AUTO: 95.2 FL
MDC_IDC_LEAD_IMPLANT_DT: NORMAL
MDC_IDC_LEAD_IMPLANT_DT: NORMAL
MDC_IDC_LEAD_LOCATION: NORMAL
MDC_IDC_LEAD_LOCATION: NORMAL
MDC_IDC_LEAD_LOCATION_DETAIL_1: NORMAL
MDC_IDC_LEAD_LOCATION_DETAIL_1: NORMAL
MDC_IDC_LEAD_MFG: NORMAL
MDC_IDC_LEAD_MFG: NORMAL
MDC_IDC_LEAD_MODEL: NORMAL
MDC_IDC_LEAD_MODEL: NORMAL
MDC_IDC_LEAD_POLARITY_TYPE: NORMAL
MDC_IDC_LEAD_POLARITY_TYPE: NORMAL
MDC_IDC_LEAD_SERIAL: NORMAL
MDC_IDC_LEAD_SERIAL: NORMAL
MDC_IDC_PG_IMPLANT_DTM: NORMAL
MDC_IDC_PG_MFG: NORMAL
MDC_IDC_PG_MODEL: NORMAL
MDC_IDC_PG_SERIAL: NORMAL
MDC_IDC_PG_TYPE: NORMAL
MDC_IDC_SESS_CLINIC_NAME: NORMAL
MDC_IDC_SESS_TYPE: NORMAL
MONOCYTES # BLD AUTO: 0.94 X10(3) UL (ref 0.1–1)
MONOCYTES NFR BLD AUTO: 9.9 %
NEUTROPHILS # BLD AUTO: 7.72 X10 (3) UL (ref 1.5–7.7)
NEUTROPHILS # BLD AUTO: 7.72 X10(3) UL (ref 1.5–7.7)
NEUTROPHILS NFR BLD AUTO: 81.1 %
NITRITE UR QL STRIP.AUTO: NEGATIVE
NT-PROBNP SERPL-MCNC: 2279 PG/ML (ref ?–450)
OSMOLALITY SERPL CALC.SUM OF ELEC: 295 MOSM/KG (ref 275–295)
PH UR STRIP.AUTO: 5 [PH] (ref 5–8)
PLATELET # BLD AUTO: 129 10(3)UL (ref 150–450)
POTASSIUM SERPL-SCNC: 4.4 MMOL/L (ref 3.5–5.1)
PROCALCITONIN SERPL-MCNC: 0.1 NG/ML (ref ?–0.16)
PROT SERPL-MCNC: 7.3 G/DL (ref 6.4–8.2)
PROT UR STRIP.AUTO-MCNC: NEGATIVE MG/DL
PROTHROMBIN TIME: 19.9 SECONDS (ref 11.6–14.8)
RBC # BLD AUTO: 4.63 X10(6)UL
SARS-COV-2 RNA RESP QL NAA+PROBE: NOT DETECTED
SODIUM SERPL-SCNC: 138 MMOL/L (ref 136–145)
SP GR UR STRIP.AUTO: 1.01 (ref 1–1.03)
TROPONIN I HIGH SENSITIVITY: 44 NG/L
UROBILINOGEN UR STRIP.AUTO-MCNC: NORMAL MG/DL
WBC # BLD AUTO: 9.5 X10(3) UL (ref 4–11)

## 2023-08-17 PROCEDURE — 71045 X-RAY EXAM CHEST 1 VIEW: CPT | Performed by: STUDENT IN AN ORGANIZED HEALTH CARE EDUCATION/TRAINING PROGRAM

## 2023-08-17 PROCEDURE — 70450 CT HEAD/BRAIN W/O DYE: CPT | Performed by: STUDENT IN AN ORGANIZED HEALTH CARE EDUCATION/TRAINING PROGRAM

## 2023-08-17 RX ORDER — POTASSIUM CHLORIDE 750 MG/1
20 CAPSULE, EXTENDED RELEASE ORAL 2 TIMES DAILY
COMMUNITY

## 2023-08-17 RX ORDER — FUROSEMIDE 10 MG/ML
20 INJECTION INTRAMUSCULAR; INTRAVENOUS ONCE
Status: COMPLETED | OUTPATIENT
Start: 2023-08-17 | End: 2023-08-17

## 2023-08-18 ENCOUNTER — APPOINTMENT (OUTPATIENT)
Dept: CV DIAGNOSTICS | Facility: HOSPITAL | Age: 88
End: 2023-08-18
Attending: HOSPITALIST
Payer: MEDICARE

## 2023-08-18 PROBLEM — R41.0 CONFUSION: Status: ACTIVE | Noted: 2023-08-18

## 2023-08-18 PROBLEM — R53.1 WEAKNESS GENERALIZED: Status: ACTIVE | Noted: 2023-08-18

## 2023-08-18 LAB
ADENOVIRUS PCR:: NOT DETECTED
B PARAPERT DNA SPEC QL NAA+PROBE: NOT DETECTED
B PERT DNA SPEC QL NAA+PROBE: NOT DETECTED
C PNEUM DNA SPEC QL NAA+PROBE: NOT DETECTED
CK SERPL-CCNC: 109 U/L
CORONAVIRUS 229E PCR:: NOT DETECTED
CORONAVIRUS HKU1 PCR:: NOT DETECTED
CORONAVIRUS NL63 PCR:: NOT DETECTED
CORONAVIRUS OC43 PCR:: NOT DETECTED
FLUAV RNA SPEC QL NAA+PROBE: NOT DETECTED
FLUBV RNA SPEC QL NAA+PROBE: NOT DETECTED
METAPNEUMOVIRUS PCR:: NOT DETECTED
MYCOPLASMA PNEUMONIA PCR:: NOT DETECTED
PARAINFLUENZA 1 PCR:: NOT DETECTED
PARAINFLUENZA 2 PCR:: NOT DETECTED
PARAINFLUENZA 3 PCR:: NOT DETECTED
PARAINFLUENZA 4 PCR:: NOT DETECTED
RHINOVIRUS/ENTERO PCR:: NOT DETECTED
RSV RNA SPEC QL NAA+PROBE: NOT DETECTED
SARS-COV-2 RNA NPH QL NAA+NON-PROBE: NOT DETECTED

## 2023-08-18 PROCEDURE — 99223 1ST HOSP IP/OBS HIGH 75: CPT | Performed by: HOSPITALIST

## 2023-08-18 PROCEDURE — 93306 TTE W/DOPPLER COMPLETE: CPT | Performed by: HOSPITALIST

## 2023-08-18 RX ORDER — FUROSEMIDE 40 MG/1
40 TABLET ORAL DAILY
Status: DISCONTINUED | OUTPATIENT
Start: 2023-08-18 | End: 2023-08-20

## 2023-08-18 RX ORDER — ATORVASTATIN CALCIUM 20 MG/1
20 TABLET, FILM COATED ORAL DAILY
Status: DISCONTINUED | OUTPATIENT
Start: 2023-08-18 | End: 2023-08-20

## 2023-08-18 RX ORDER — PREDNISONE 5 MG/1
5 TABLET ORAL DAILY
Status: DISCONTINUED | OUTPATIENT
Start: 2023-08-18 | End: 2023-08-20

## 2023-08-18 RX ORDER — ONDANSETRON 2 MG/ML
4 INJECTION INTRAMUSCULAR; INTRAVENOUS EVERY 6 HOURS PRN
Status: DISCONTINUED | OUTPATIENT
Start: 2023-08-18 | End: 2023-08-20

## 2023-08-18 RX ORDER — DOCUSATE SODIUM 100 MG/1
100 CAPSULE, LIQUID FILLED ORAL 2 TIMES DAILY
Status: DISCONTINUED | OUTPATIENT
Start: 2023-08-18 | End: 2023-08-20

## 2023-08-18 RX ORDER — ACETAMINOPHEN 325 MG/1
650 TABLET ORAL EVERY 4 HOURS PRN
Status: DISCONTINUED | OUTPATIENT
Start: 2023-08-18 | End: 2023-08-20

## 2023-08-18 RX ORDER — POTASSIUM CHLORIDE 750 MG/1
10 TABLET, EXTENDED RELEASE ORAL DAILY
Status: DISCONTINUED | OUTPATIENT
Start: 2023-08-18 | End: 2023-08-20

## 2023-08-18 RX ORDER — AMLODIPINE BESYLATE 5 MG/1
5 TABLET ORAL DAILY
Status: DISCONTINUED | OUTPATIENT
Start: 2023-08-18 | End: 2023-08-20

## 2023-08-18 RX ORDER — METOCLOPRAMIDE HYDROCHLORIDE 5 MG/ML
5 INJECTION INTRAMUSCULAR; INTRAVENOUS EVERY 8 HOURS PRN
Status: DISCONTINUED | OUTPATIENT
Start: 2023-08-18 | End: 2023-08-20

## 2023-08-18 RX ORDER — HYDROCODONE BITARTRATE AND ACETAMINOPHEN 5; 325 MG/1; MG/1
1 TABLET ORAL EVERY 4 HOURS PRN
Status: DISCONTINUED | OUTPATIENT
Start: 2023-08-18 | End: 2023-08-20

## 2023-08-18 NOTE — ED QUICK NOTES
Orders for admission, patient is aware of plan and ready to go upstairs. Any questions, please call ED RN Peri at extension [phone].      Patient Covid vaccination status: Partially vaccinated     COVID Test Ordered in ED: Rapid SARS-CoV-2 by PCR    COVID Suspicion at Admission: N/A    Running Infusions:  None    Mental Status/LOC at time of transport: A&O-self    Other pertinent information:   CIWA score: N/A   NIH score:  36

## 2023-08-18 NOTE — PHYSICAL THERAPY NOTE
PHYSICAL THERAPY EVALUATION - INPATIENT     Room Number: 9420/5117-F  Evaluation Date: 8/18/2023  Type of Evaluation: Initial  Physician Order: PT Eval and Treat    Presenting Problem: Generalized weakness, confusion  Co-Morbidities : atrial fibrillation, CAD status post CABG, pacemaker and hyperlipidemia  Reason for Therapy: Mobility Dysfunction and Discharge Planning    History related to current admission: Patient is a 80year old female admitted on 8/17/2023 from home for generalized weakness, confusion. Pt diagnosed with acute encephalopathy. ASSESSMENT   In this PT evaluation, the patient presents with the following impairments decreased strength, functional mobility, trunk control, balance, endurance. These impairments and comorbidities manifest themselves as functional limitations in independent bed mobility, transfers, and gait. The patient is below baseline and would benefit from skilled inpatient PT to address the above deficits to assist patient in returning to prior to level of function. Functional outcome measures completed include [facility]. The AM-PAC '6-Clicks' Inpatient Basic Mobility Short Form was completed and this patient is demonstrating a Approx Degree of Impairment: 76.75%  degree of impairment in mobility. Research supports that patients with this level of impairment may benefit from DAVID, however per , pt very close to baseline and reports he is comfortable managing at home, as well as reporting she did not improve with DAVID the last time. Pt would benefit from HHPT/24hr care, as well as a hospital bed, and mechanical lift, currently a sit to stand machine would be beneficial.    DISCHARGE RECOMMENDATIONS  PT Discharge Recommendations: Home with home health PT;24 hour care/supervision (hospital bed, lift equipment)    PLAN  PT Treatment Plan: Bed mobility; Body mechanics; Endurance; Energy conservation;Patient education; Family education;Gait training;Balance training;Transfer training;Strengthening  Rehab Potential : Fair  Frequency (Obs):  (2-3x/wek)  Number of Visits to Meet Established Goals: 4      CURRENT GOALS    Goal #1 Patient is able to demonstrate supine - sit EOB @ level: maximum assistance of 1 person     Goal #2 Patient is able to demonstrate transfers EOB to/from Chair/Wheelchair at assistance level: maximum assistance of 1 person     Goal #3 Patient is able to ambulate 2 feet with assist device: walker - rolling at assistance level: maximum assistance     Goal #4    Goal #5    Goal #6    Goal Comments: Goals established on 2023    HOME SITUATION  Type of Home: Independent living facility ([facility])   Home Layout: One level  Stairs to Enter : 0             Lives With: Spouse;Caregiver part-time (caregiver 5 days/wk for 6hrs/day)  Drives: No  Patient Owned Equipment: Rolling walker;Gait belt; Wheelchair  Patient Regularly Uses: Hearing aides    Prior Level of [place]: Pt's  reports that typically pt performs pivot transfers bed<>wc with pt's  or caregiver performing 90% of the work (maxA). As of last week pt was able to take a few steps to the chair with assist but has not been doing since.      SUBJECTIVE  \"You two are very cute\"      OBJECTIVE  Precautions: Hard of hearing;Bed/chair alarm  Fall Risk: High fall risk    WEIGHT BEARING RESTRICTION  Weight Bearing Restriction: None                PAIN ASSESSMENT  Ratin          COGNITION  Awareness of Errors:  decreased awareness of errors     RANGE OF MOTION AND STRENGTH ASSESSMENT  See OT note for UE assessment    Lower extremity ROM is within functional limits      Lower extremity strength is  grossly 2+ to 3/5      BALANCE  Static Sitting: Poor +  Dynamic Sitting: Poor  Static Standing: Poor  Dynamic Standing: Poor    ADDITIONAL TESTS                                    ACTIVITY TOLERANCE  Pulse: 70  Heart Rate Source: Monitor                   O2 WALK       NEUROLOGICAL FINDINGS                        AM-PAC '6-Clicks' INPATIENT SHORT FORM - BASIC MOBILITY  How much difficulty does the patient currently have. .. Patient Difficulty: Turning over in bed (including adjusting bedclothes, sheets and blankets)?: A Lot   Patient Difficulty: Sitting down on and standing up from a chair with arms (e.g., wheelchair, bedside commode, etc.): A Lot   Patient Difficulty: Moving from lying on back to sitting on the side of the bed?: A Lot   How much help from another person does the patient currently need. .. Help from Another: Moving to and from a bed to a chair (including a wheelchair)?: A Lot   Help from Another: Need to walk in hospital room?: Total   Help from Another: Climbing 3-5 steps with a railing?: Total       AM-PAC Score:  Raw Score: 10   Approx Degree of Impairment: 76.75%   Standardized Score (AM-PAC Scale): 32.29   CMS Modifier (G-Code): CL    FUNCTIONAL ABILITY STATUS  Gait Assessment   Functional Mobility/Gait Assessment  Gait Assistance: Not tested    Skilled Therapy Provided       Therapeutic Activity:   Pt cued on placement of UE and LEs for optimal force generation and safe STS transfer. Pt cued on usage of arm rests for controlled descent into sitting  Educated pt on reaching for bed rail to facilitate supine to sit tx  Cued pt on reaching for lateral arm rest of chair during pivot tx  Educated pt and family regarding benefit of hospital bed and lift equipment for ease of transfers at home      Bed Mobility:  Rolling: NT  Supine to sit: maxA x2  Sit to supine: NT     Transfer Mobility:  Sit to stand: modA   Stand to sit: modA  Squat pivot tx: maxA x1    Therapist's Comments: RN cleared for session. Pt agreeable for therapy, received supine.  present for session and providing PLOF. Instructed to call for nursing staff for any needs and OOB mobility.          Exercise/Education Provided:  Bed mobility  Body mechanics  Energy conservation  Functional activity tolerated  Gait training  Posture  Strengthening  Transfer training    Patient End of Session: In bed;Needs met;Call light within reach;RN aware of session/findings; All patient questions and concerns addressed; Alarm set; Family present; Discussed recommendations with /      Patient Evaluation Complexity Level:  History Moderate - 1 or 2 personal factors and/or co-morbidities   Examination of body systems Low - addressing 1-2 elements   Clinical Presentation Low - Stable   Clinical Decision Making Low - Stable       PT Session Time: 35 minutes  Gait Trainin minutes  Therapeutic Activity: 15 minutes

## 2023-08-18 NOTE — ED INITIAL ASSESSMENT (HPI)
Patient from home with . Symptoms began yesterday at 8pm, left sided facial droop noted, difficulty ambulating and change in mentation. Symptoms progressively worse today.

## 2023-08-18 NOTE — CM/SW NOTE
MSW met with pt's spouse who would like [address] [place] [address] asked to send referrals. PT rec for lift and hospital bed. Spouse is not sure he wants a stephanie lift, but open to it if it's the only option. MSW will ask MD if a hospital bed can be ordered. [facility] does not provide any other type of lift beside a stephanie lift. Jose Khan pending-need to offer choice  Spouse wants to drive pt home, we discussed she may need a w/c van. MSW told spouse that Sit to stand is not covered. Follow up :  Jose Khan pending  Hospital Bed order pending, stephanie pending. ( Need to send signed order and verbiage to [facility]).     Bushra Funes

## 2023-08-18 NOTE — CM/SW NOTE
Department  notified of request for [address], aidin referrals started. Assigned CM/SW to follow up with pt/family on further discharge planning.       Kevin BLEVINS [facility]

## 2023-08-18 NOTE — DISCHARGE INSTRUCTIONS
Human care Contact- Jesse Carvalho  [phone]  Arabella Sit to stand-not covered by insurance  Jonas Chair    Sometimes managing your health at home requires assistance. The [facility] team has recognized your preference to use [facility]. They can be reached by phone at [phone]. The fax number for your reference is [phone]. . A representative from the home health agency will contact you or your family to schedule your first visit.       [facility]  Y:[phone]  R:[phone]

## 2023-08-18 NOTE — PROGRESS NOTES
ED Antibiotic Dose Adjustment by Pharmacy    piperacillin/tazobactam (ZOSYN) 3.375 g x1 dose has been ordered. Pharmacy has adjusted the dose to piperacillin/tazobactam (ZOSYN) 4.5 g x1 per hospital protocol.     Chela Montez, PharmD  Clinical Pharmacist Specialist- Emergency Medicine  [facility]  [phone]

## 2023-08-18 NOTE — SLP NOTE
ADULT SWALLOWING EVALUATION    ASSESSMENT    ASSESSMENT/OVERALL IMPRESSION:  Order received as per RN dysphagia screen; chart reviewed. Patient presented from home with left facial droop, increased weakness and confusion. Patient diagnosed with sepsis. Head CT negative for acute process and CXR with no significant change. Patient lives in independent living with spouse at [facility] but has caregiver daily for 6 hours. Patient on regular diet with thin liquids at baseline. Spouse reported patient with hx old stroke and TIA, falls with leg and pelvic fractures, pacemaker. Patient received asleep however awakened with verbal stimuli. Patient with increased alertness as session progressed. Patient with slight left facial asymmetry however unable to follow direct oral motor commands for OME. Vocal quality clear. Patient assisted with 1:1 feeding of PO trials. Patient demonstrated a suspected mild oropharyngeal dysphagia characterized by decreased bolus acceptance, decreased mastication efficiency, suspected delayed initiation of pharyngeal swallow response. Time spent with patient on diet texture analysis. Patient tolerated yogurt with granola and orange juice. No overt clinical s/s aspiration observed. Patient benefited from controlled straw sips of thin liquid and slow rate of feeding. Immediate cough noted after successive straw sips of thin liquid by straw of impulsive style. No further clinical s/s aspiration observed or suspected. RECOMMENDATIONS   Diet Recommendations - Solids: Regular  Diet Recommendations - Liquids: Thin Liquids (By controlled sips via Straw)   Recommend 1:1 slow feeding assist  Compensatory Strategies Recommended: Pinched straw sips  Aspiration Precautions: Upright position; Slow rate;Small bites and sips  Medication Administration Recommendations: One pill at a time; Whole in puree;Crushed in puree  Treatment Plan/Recommendations: Aspiration precautions HISTORY   MEDICAL HISTORY  Reason for Referral: RN dysphagia screen    Problem List  Principal Problem:    Sepsis due to undetermined organism [facility])  Active Problems:    Encephalopathy, INFECTION    Weakness generalized    Confusion      Past Medical History  Past Medical History:   Diagnosis Date    Arrhythmia     Afib    Coronary atherosclerosis     Hearing impairment     jeffrey. HA    High cholesterol     Hx of CABG     Incontinence     Pacemaker        Prior Living Situation: Home with spouse;Home with support; Independent living  Diet Prior to Admission: Regular; Thin liquids       Patient/Family Goals: to get better - per spouse    SWALLOWING HISTORY  Current Diet Consistency: Regular; Thin liquids  Dysphagia History: none  Imaging Results: as per above      OBJECTIVE   ORAL MOTOR EXAMINATION  Dentition: Functional  Symmetry: Reduced left facial  Strength: Unable to assess  Tone: Unable to assess  Range of Motion: Unable to assess  Rate of Motion: Unable to assess    Voice Quality: Clear;Weak  Respiratory Status: Unlabored  Consistencies Trialed: Thin liquids;Puree;Hard solid  Method of Presentation: Staff/Clinician assistance  Patient Positioning: Upright;Midline    Oral Phase of Swallow: Impaired  Bolus Retrieval: Impaired  Bilabial Seal: Intact  Bolus Formation: Intact  Bolus Propulsion: Impaired  Mastication: Impaired  Retention: Intact    Pharyngeal Phase of Swallow: Impaired  Laryngeal Elevation Timing: Appears impaired  Laryngeal Elevation Strength: Appears intact  Laryngeal Elevation Coordination: Appears intact  (Please note: Silent aspiration cannot be evaluated clinically.  Videofluoroscopic Swallow Study is required to rule-out silent aspiration.)    Esophageal Phase of Swallow: No complaints consistent with possible esophageal involvement              GOALS  Goal #1 The patient will tolerate regular consistency and thin liquids without overt signs or symptoms of aspiration with 90 % accuracy over 1-2 session(s). In Progress   Goal #2 The patient/family/caregiver will demonstrate understanding and implementation of aspiration precautions and swallow strategies independently over 1-2 session(s).     In Progress     FOLLOW UP  Treatment Plan/Recommendations: Aspiration precautions     Follow Up Needed (Documentation Required): Yes  SLP Follow-up Date: 08/21/23    Thank you for your referral.   If you have any questions, please contact Komal Giraldo, [address], [facility] [address] CCC-SLP/L, pager [phone]  Speech-Language Pathologist

## 2023-08-19 LAB
ALBUMIN SERPL-MCNC: 2.7 G/DL (ref 3.4–5)
ALBUMIN/GLOB SERPL: 0.9 {RATIO} (ref 1–2)
ALP LIVER SERPL-CCNC: 58 U/L
ALT SERPL-CCNC: 19 U/L
ANION GAP SERPL CALC-SCNC: 4 MMOL/L (ref 0–18)
AST SERPL-CCNC: 21 U/L (ref 15–37)
ATRIAL RATE: 388 BPM
BASOPHILS # BLD AUTO: 0.03 X10(3) UL (ref 0–0.2)
BASOPHILS NFR BLD AUTO: 0.4 %
BILIRUB SERPL-MCNC: 0.7 MG/DL (ref 0.1–2)
BUN BLD-MCNC: 27 MG/DL (ref 7–18)
CALCIUM BLD-MCNC: 8.7 MG/DL (ref 8.5–10.1)
CHLORIDE SERPL-SCNC: 104 MMOL/L (ref 98–112)
CO2 SERPL-SCNC: 32 MMOL/L (ref 21–32)
CREAT BLD-MCNC: 1.31 MG/DL
EGFRCR SERPLBLD CKD-EPI 2021: 38 ML/MIN/1.73M2 (ref 60–?)
EOSINOPHIL # BLD AUTO: 0.17 X10(3) UL (ref 0–0.7)
EOSINOPHIL NFR BLD AUTO: 2.2 %
ERYTHROCYTE [DISTWIDTH] IN BLOOD BY AUTOMATED COUNT: 15 %
GLOBULIN PLAS-MCNC: 3.1 G/DL (ref 2.8–4.4)
GLUCOSE BLD-MCNC: 118 MG/DL (ref 70–99)
HCT VFR BLD AUTO: 37.3 %
HGB BLD-MCNC: 12.3 G/DL
IMM GRANULOCYTES # BLD AUTO: 0.02 X10(3) UL (ref 0–1)
IMM GRANULOCYTES NFR BLD: 0.3 %
LYMPHOCYTES # BLD AUTO: 0.86 X10(3) UL (ref 1–4)
LYMPHOCYTES NFR BLD AUTO: 11.1 %
MCH RBC QN AUTO: 30.9 PG (ref 26–34)
MCHC RBC AUTO-ENTMCNC: 33 G/DL (ref 31–37)
MCV RBC AUTO: 93.7 FL
MONOCYTES # BLD AUTO: 1.05 X10(3) UL (ref 0.1–1)
MONOCYTES NFR BLD AUTO: 13.5 %
NEUTROPHILS # BLD AUTO: 5.62 X10 (3) UL (ref 1.5–7.7)
NEUTROPHILS # BLD AUTO: 5.62 X10(3) UL (ref 1.5–7.7)
NEUTROPHILS NFR BLD AUTO: 72.5 %
OSMOLALITY SERPL CALC.SUM OF ELEC: 296 MOSM/KG (ref 275–295)
PLATELET # BLD AUTO: 115 10(3)UL (ref 150–450)
POTASSIUM SERPL-SCNC: 3.2 MMOL/L (ref 3.5–5.1)
POTASSIUM SERPL-SCNC: 4.2 MMOL/L (ref 3.5–5.1)
PROT SERPL-MCNC: 5.8 G/DL (ref 6.4–8.2)
Q-T INTERVAL: 420 MS
QRS DURATION: 154 MS
QTC CALCULATION (BEZET): 453 MS
R AXIS: -67 DEGREES
RBC # BLD AUTO: 3.98 X10(6)UL
SODIUM SERPL-SCNC: 140 MMOL/L (ref 136–145)
T AXIS: 102 DEGREES
VENTRICULAR RATE: 70 BPM
WBC # BLD AUTO: 7.8 X10(3) UL (ref 4–11)

## 2023-08-19 PROCEDURE — 99232 SBSQ HOSP IP/OBS MODERATE 35: CPT | Performed by: HOSPITALIST

## 2023-08-19 PROCEDURE — 99497 ADVNCD CARE PLAN 30 MIN: CPT | Performed by: HOSPITALIST

## 2023-08-19 RX ORDER — POTASSIUM CHLORIDE 20 MEQ/1
40 TABLET, EXTENDED RELEASE ORAL EVERY 4 HOURS
Status: COMPLETED | OUTPATIENT
Start: 2023-08-19 | End: 2023-08-19

## 2023-08-19 NOTE — PLAN OF CARE
Pt. A&O x1, drowsy, on RA, v-paced on tele. VSS. Wheelchair bound at baseline, turning offered. Saline locked. No c/o pain. Took meds whole in applesauce per speech recs. Fall and safety precautions in place. Plan of care ongoing.      Problem: Patient/Family Goals  Goal: Patient/Family Long Term Goal  Description: Patient's Long Term Goal: Go home    Interventions:  - Waiting on home health care equipment to be delivered  - See additional Care Plan goals for specific interventions  Outcome: Progressing  Goal: Patient/Family Short Term Goal  Description: Patient's Short Term Goal: Feel better    Interventions:   - IV antibiotics  - Monitor for mental status changes  - See additional Care Plan goals for specific interventions  Outcome: Progressing

## 2023-08-19 NOTE — PROGRESS NOTES
Patient requires a semi-electric hospital bed at home due to physical condition in which they are not able to turn self and leg edema requiring legs to be elevated. Positioning in an ordinary bed will be insufficient and requires frequent and/or immediate changes in body positions. Patient requires a stephanie lift has been ordered because a stephanie lift is needed for transfer between bed and a chair, wheelchair, or commode, and without the use of a lift the patient would be bed confined.

## 2023-08-19 NOTE — PLAN OF CARE
80year old female, a/o x 1. Tele afib v paced. RA. On zosyn IV. Up with 2 assist.  Continue plan of care. Problem: Patient/Family Goals  Goal: Patient/Family Long Term Goal  Description: Patient's Long Term Goal: be discharged     Interventions:  - follow plan of care. - See additional Care Plan goals for specific interventions  Outcome: Progressing  Goal: Patient/Family Short Term Goal  Description: Patient's Short Term Goal: assist in discharge planning    Interventions:   - follow plan of care.    - See additional Care Plan goals for specific interventions  Outcome: Progressing

## 2023-08-19 NOTE — PLAN OF CARE
The patient is A/Ox1-2, per , the patient is at her \"baseline\". On RA, no SOB  Tele - V-paced  Vitals Stable  Afebrile  No c/o of pain. On Zosyn per [facility]  Urine culture sent out to the lab. K replaced per [facility]  Safety precautions in place. Staff will continue to monitor. Problem: Patient/Family Goals  Goal: Patient/Family Long Term Goal  Description: Patient's Long Term Goal: Go home    Interventions:  - Waiting on home health care equipment to be delivered  - See additional Care Plan goals for specific interventions  Outcome: Progressing  Goal: Patient/Family Short Term Goal  Description: Patient's Short Term Goal: Feel better    Interventions:   - IV antibiotics  - Monitor for mental status changes  - See additional Care Plan goals for specific interventions  Outcome: Progressing     Problem: RISK FOR INFECTION - ADULT  Goal: Absence of fever/infection during anticipated neutropenic period  Description: INTERVENTIONS  - Monitor WBC  - Administer growth factors as ordered  - Implement neutropenic guidelines  Outcome: Progressing     Problem: SAFETY ADULT - FALL  Goal: Free from fall injury  Description: INTERVENTIONS:  - Assess pt frequently for physical needs  - Identify cognitive and physical deficits and behaviors that affect risk of falls.   - [place] fall precautions as indicated by assessment.  - Educate pt/family on patient safety including physical limitations  - Instruct pt to call for assistance with activity based on assessment  - Modify environment to reduce risk of injury  - Provide assistive devices as appropriate  - Consider OT/PT consult to assist with strengthening/mobility  - Encourage toileting schedule  Outcome: Progressing     Problem: DISCHARGE PLANNING  Goal: Discharge to home or other facility with appropriate resources  Description: INTERVENTIONS:  - Identify barriers to discharge w/pt and caregiver  - Include patient/family/discharge partner in discharge planning  - Arrange for needed discharge resources and transportation as appropriate  - Identify discharge learning needs (meds, wound care, etc)  - Arrange for interpreters to assist at discharge as needed  - Consider post-discharge preferences of patient/family/discharge partner  - Complete POLST form as appropriate  - Assess patient's ability to be responsible for managing their own health  - Refer to Case Management Department for coordinating discharge planning if the patient needs post-hospital services based on physician/LIP order or complex needs related to functional status, cognitive ability or social support system  Outcome: Progressing

## 2023-08-19 NOTE — CM/SW NOTE
08/19/23 1400   Discharge disposition   Expected discharge disposition Home-Health   Post Acute Care Provider Residential   Discharge transportation Other (comment)     Pt discussed with RN. SW met with pt, and sps to discuss dc planning, per pt sps they chose [facility] [place]. SW reserved in aidin. SW uploaded DME signed orders/MD note to referral. Per sps anticipating dc tomorrow. SW to remain available for dc planning, and additional need for support. Addendum 1525:  Uploaded new order for [facility] to [facility] referral, to include PT/RN.     Leela Kellogg [facility]  Discharge Planner  [id number]

## 2023-08-19 NOTE — HOME CARE LIAISON
Received referral via [facility] for Home Health services. Spoke w/ patient and  via the phone. Answered all home health questions  had about services. Wanted to make sure visits are in the morning so that the care giver is also aware of what is being done care wise.  Will continue to be available for all home health needs

## 2023-08-20 VITALS
SYSTOLIC BLOOD PRESSURE: 149 MMHG | TEMPERATURE: 98 F | OXYGEN SATURATION: 95 % | HEART RATE: 71 BPM | BODY MASS INDEX: 23.95 KG/M2 | DIASTOLIC BLOOD PRESSURE: 69 MMHG | HEIGHT: 68 IN | WEIGHT: 158 LBS | RESPIRATION RATE: 18 BRPM

## 2023-08-20 LAB
ANION GAP SERPL CALC-SCNC: 3 MMOL/L (ref 0–18)
BASOPHILS # BLD AUTO: 0.04 X10(3) UL (ref 0–0.2)
BASOPHILS NFR BLD AUTO: 0.6 %
BUN BLD-MCNC: 26 MG/DL (ref 7–18)
CALCIUM BLD-MCNC: 9 MG/DL (ref 8.5–10.1)
CHLORIDE SERPL-SCNC: 106 MMOL/L (ref 98–112)
CO2 SERPL-SCNC: 32 MMOL/L (ref 21–32)
CREAT BLD-MCNC: 1.18 MG/DL
EGFRCR SERPLBLD CKD-EPI 2021: 44 ML/MIN/1.73M2 (ref 60–?)
EOSINOPHIL # BLD AUTO: 0.24 X10(3) UL (ref 0–0.7)
EOSINOPHIL NFR BLD AUTO: 3.4 %
ERYTHROCYTE [DISTWIDTH] IN BLOOD BY AUTOMATED COUNT: 14.9 %
GLUCOSE BLD-MCNC: 104 MG/DL (ref 70–99)
HCT VFR BLD AUTO: 39.6 %
HGB BLD-MCNC: 12.5 G/DL
IMM GRANULOCYTES # BLD AUTO: 0.03 X10(3) UL (ref 0–1)
IMM GRANULOCYTES NFR BLD: 0.4 %
LYMPHOCYTES # BLD AUTO: 1.03 X10(3) UL (ref 1–4)
LYMPHOCYTES NFR BLD AUTO: 14.7 %
MAGNESIUM SERPL-MCNC: 2.4 MG/DL (ref 1.6–2.6)
MCH RBC QN AUTO: 30.6 PG (ref 26–34)
MCHC RBC AUTO-ENTMCNC: 31.6 G/DL (ref 31–37)
MCV RBC AUTO: 96.8 FL
MONOCYTES # BLD AUTO: 0.99 X10(3) UL (ref 0.1–1)
MONOCYTES NFR BLD AUTO: 14.1 %
NEUTROPHILS # BLD AUTO: 4.67 X10 (3) UL (ref 1.5–7.7)
NEUTROPHILS # BLD AUTO: 4.67 X10(3) UL (ref 1.5–7.7)
NEUTROPHILS NFR BLD AUTO: 66.8 %
OSMOLALITY SERPL CALC.SUM OF ELEC: 297 MOSM/KG (ref 275–295)
PLATELET # BLD AUTO: 125 10(3)UL (ref 150–450)
POTASSIUM SERPL-SCNC: 4.1 MMOL/L (ref 3.5–5.1)
RBC # BLD AUTO: 4.09 X10(6)UL
SODIUM SERPL-SCNC: 141 MMOL/L (ref 136–145)
WBC # BLD AUTO: 7 X10(3) UL (ref 4–11)

## 2023-08-20 PROCEDURE — 99239 HOSP IP/OBS DSCHRG MGMT >30: CPT | Performed by: HOSPITALIST

## 2023-08-20 RX ORDER — CEFUROXIME AXETIL 250 MG/1
250 TABLET ORAL 2 TIMES DAILY
Qty: 10 TABLET | Refills: 0 | Status: SHIPPED | OUTPATIENT
Start: 2023-08-20

## 2023-08-20 NOTE — PLAN OF CARE
Assumed care at [address]. No acute distress noted. A&Ox1-2, very tired. Room air. V-paced on tele. Cardiac diet. 1:1 feed, reg food; small bites, thin liquids; controlled sips via straws. Meds crushed with applesauce. Incontinent. Brief, mary ann, and purewick in place. Meds per MAR. PT rec HH. Awaiting stephanie lift and medical bed. SW following. Safety precautions in place. Resting in bed at this time. Needs met. Problem: RISK FOR INFECTION - ADULT  Goal: Absence of fever/infection during anticipated neutropenic period  Description: INTERVENTIONS  - Monitor WBC  - Administer growth factors as ordered  - Implement neutropenic guidelines  Outcome: Progressing     Problem: SAFETY ADULT - FALL  Goal: Free from fall injury  Description: INTERVENTIONS:  - Assess pt frequently for physical needs  - Identify cognitive and physical deficits and behaviors that affect risk of falls.   - [place] fall precautions as indicated by assessment.  - Educate pt/family on patient safety including physical limitations  - Instruct pt to call for assistance with activity based on assessment  - Modify environment to reduce risk of injury  - Provide assistive devices as appropriate  - Consider OT/PT consult to assist with strengthening/mobility  - Encourage toileting schedule  Outcome: Progressing     Problem: DISCHARGE PLANNING  Goal: Discharge to home or other facility with appropriate resources  Description: INTERVENTIONS:  - Identify barriers to discharge w/pt and caregiver  - Include patient/family/discharge partner in discharge planning  - Arrange for needed discharge resources and transportation as appropriate  - Identify discharge learning needs (meds, wound care, etc)  - Arrange for interpreters to assist at discharge as needed  - Consider post-discharge preferences of patient/family/discharge partner  - Complete POLST form as appropriate  - Assess patient's ability to be responsible for managing their own health  - Refer to Case Management Department for coordinating discharge planning if the patient needs post-hospital services based on physician/LIP order or complex needs related to functional status, cognitive ability or social support system  Outcome: Progressing

## 2023-08-20 NOTE — PLAN OF CARE
Pt A&Ox4  RA;VSS  Tele- V paced  DC home today  Staff will continue to monitor       Problem: Patient/Family Goals  Goal: Patient/Family Long Term Goal  Description: Patient's Long Term Goal: Go home    Interventions:  - Waiting on home health care equipment to be delivered  - See additional Care Plan goals for specific interventions  Outcome: Progressing  Goal: Patient/Family Short Term Goal  Description: Patient's Short Term Goal: Feel better    Interventions:   - IV antibiotics  - Monitor for mental status changes  - See additional Care Plan goals for specific interventions  Outcome: Progressing     Problem: RISK FOR INFECTION - ADULT  Goal: Absence of fever/infection during anticipated neutropenic period  Description: INTERVENTIONS  - Monitor WBC  - Administer growth factors as ordered  - Implement neutropenic guidelines  Outcome: Progressing     Problem: SAFETY ADULT - FALL  Goal: Free from fall injury  Description: INTERVENTIONS:  - Assess pt frequently for physical needs  - Identify cognitive and physical deficits and behaviors that affect risk of falls.   - [place] fall precautions as indicated by assessment.  - Educate pt/family on patient safety including physical limitations  - Instruct pt to call for assistance with activity based on assessment  - Modify environment to reduce risk of injury  - Provide assistive devices as appropriate  - Consider OT/PT consult to assist with strengthening/mobility  - Encourage toileting schedule  Outcome: Progressing     Problem: DISCHARGE PLANNING  Goal: Discharge to home or other facility with appropriate resources  Description: INTERVENTIONS:  - Identify barriers to discharge w/pt and caregiver  - Include patient/family/discharge partner in discharge planning  - Arrange for needed discharge resources and transportation as appropriate  - Identify discharge learning needs (meds, wound care, etc)  - Arrange for interpreters to assist at discharge as needed  - Consider post-discharge preferences of patient/family/discharge partner  - Complete POLST form as appropriate  - Assess patient's ability to be responsible for managing their own health  - Refer to Case Management Department for coordinating discharge planning if the patient needs post-hospital services based on physician/LIP order or complex needs related to functional status, cognitive ability or social support system  Outcome: Progressing

## 2023-08-20 NOTE — PLAN OF CARE
NURSING DISCHARGE NOTE    Discharged Home via Ambulatory. Accompanied by Spouse and Support staff  Belongings Taken by patient/family.   IV removed  DC paperwork discussed   Pt left unit in stable condition

## 2023-08-20 NOTE — CM/SW NOTE
DME referral checked for stephanie lift/hospital bed. [facility] has received the documentation, however unable to process order until Monday. Aware that pt may be medically cleared today, will follow up with team for dc plans. DC Delay due to Equipment barriers. It will be an unsafe discharge for pt to dc without the equipment. Need to ensure caregiver is present as well. Discussed with RN. Avoidable day entered. Per therapy notes-- recommendations to use a sit to stand for transfers -- or max 2 assist.     ADDENDUM 11:30- DENISE met with pt spouse and caregiver at bedside. Both comfortable with pt dc'ing today and having HME follow up with them tomorrow.  states they will transport pt and transport not needed. Talked with RN on DC plans. Notified [facility][1] as well. 2:00- DENISE made aware pt spouse requesting transport home. DENISE scheduled ambulance transport for 4:30 PM, PCS complete.       08/20/23 1400   Discharge disposition   Expected discharge disposition Home-Health   Post Acute Care Provider Residential   Additional Home Care/Hospice Provider   (Private Duty Caregiver)   Discharge transportation [phone] [place] [address]  Discharge [facility]  [id number]

## 2023-11-24 ENCOUNTER — ANCILLARY PROCEDURE (OUTPATIENT)
Dept: CARDIOLOGY | Age: 88
End: 2023-11-24
Attending: INTERNAL MEDICINE

## 2023-11-24 ENCOUNTER — ANCILLARY ORDERS (OUTPATIENT)
Dept: CARDIOLOGY | Age: 88
End: 2023-11-24

## 2023-11-24 DIAGNOSIS — Z95.0 CARDIAC PACEMAKER: ICD-10-CM

## 2023-11-24 PROCEDURE — 93294 REM INTERROG EVL PM/LDLS PM: CPT | Performed by: INTERNAL MEDICINE

## 2023-11-28 ENCOUNTER — TELEPHONE (OUTPATIENT)
Dept: CARDIOLOGY | Age: 88
End: 2023-11-28

## 2023-12-04 ENCOUNTER — APPOINTMENT (OUTPATIENT)
Dept: CARDIOLOGY | Age: 88
End: 2023-12-04
Attending: INTERNAL MEDICINE

## 2023-12-04 ENCOUNTER — TELEPHONIC VISIT (OUTPATIENT)
Dept: CARDIOLOGY | Age: 88
End: 2023-12-04

## 2023-12-04 VITALS — SYSTOLIC BLOOD PRESSURE: 132 MMHG | DIASTOLIC BLOOD PRESSURE: 59 MMHG | HEART RATE: 70 BPM

## 2023-12-04 DIAGNOSIS — Z79.01 LONG TERM (CURRENT) USE OF ANTICOAGULANTS: ICD-10-CM

## 2023-12-04 DIAGNOSIS — I44.2 COMPLETE AV BLOCK DUE TO AV NODAL ABLATION (CMD): ICD-10-CM

## 2023-12-04 DIAGNOSIS — Z95.3 HISTORY OF AORTIC VALVE REPLACEMENT WITH BIOPROSTHETIC VALVE: ICD-10-CM

## 2023-12-04 DIAGNOSIS — Z98.890 HISTORY OF MITRAL VALVE REPAIR: ICD-10-CM

## 2023-12-04 DIAGNOSIS — I48.20 CHRONIC ATRIAL FIBRILLATION (CMD): Primary | ICD-10-CM

## 2023-12-04 DIAGNOSIS — I97.190 COMPLETE AV BLOCK DUE TO AV NODAL ABLATION (CMD): ICD-10-CM

## 2023-12-04 DIAGNOSIS — I08.0 MILD MITRAL AND AORTIC REGURGITATION: ICD-10-CM

## 2023-12-04 DIAGNOSIS — Z95.0 PACEMAKER: ICD-10-CM

## 2023-12-04 PROCEDURE — 99442 TELEPHONE E&M BY PHYSICIAN EST PT NOT ORIG PREV 7 DAYS 11-20 MIN: CPT | Performed by: INTERNAL MEDICINE

## 2023-12-04 SDOH — HEALTH STABILITY: PHYSICAL HEALTH: ON AVERAGE, HOW MANY DAYS PER WEEK DO YOU ENGAGE IN MODERATE TO STRENUOUS EXERCISE (LIKE A BRISK WALK)?: 0 DAYS

## 2023-12-04 SDOH — HEALTH STABILITY: PHYSICAL HEALTH: ON AVERAGE, HOW MANY MINUTES DO YOU ENGAGE IN EXERCISE AT THIS LEVEL?: 0 MIN

## 2023-12-04 ASSESSMENT — PATIENT HEALTH QUESTIONNAIRE - PHQ9
1. LITTLE INTEREST OR PLEASURE IN DOING THINGS: NOT AT ALL
SUM OF ALL RESPONSES TO PHQ9 QUESTIONS 1 AND 2: 0
CLINICAL INTERPRETATION OF PHQ2 SCORE: NO FURTHER SCREENING NEEDED
2. FEELING DOWN, DEPRESSED OR HOPELESS: NOT AT ALL
SUM OF ALL RESPONSES TO PHQ9 QUESTIONS 1 AND 2: 0
IS THE PATIENT ABLE TO COGNITIVELY COMPLETE THE PHQ9: NO

## 2024-01-17 ENCOUNTER — TELEPHONE (OUTPATIENT)
Dept: CARDIOLOGY | Age: 89
End: 2024-01-17

## 2024-01-17 ENCOUNTER — ANCILLARY PROCEDURE (OUTPATIENT)
Dept: CARDIOLOGY | Age: 89
End: 2024-01-17
Attending: INTERNAL MEDICINE

## 2024-01-17 DIAGNOSIS — Z95.0 PRESENCE OF CARDIAC PACEMAKER: ICD-10-CM

## 2024-02-12 ENCOUNTER — TELEPHONE (OUTPATIENT)
Dept: CARDIOLOGY | Age: 89
End: 2024-02-12

## 2024-02-29 ENCOUNTER — ANCILLARY PROCEDURE (OUTPATIENT)
Dept: CARDIOLOGY | Age: 89
End: 2024-02-29
Attending: INTERNAL MEDICINE

## 2024-02-29 DIAGNOSIS — Z95.0 PRESENCE OF CARDIAC PACEMAKER: ICD-10-CM

## 2024-04-10 ENCOUNTER — LAB REQUISITION (OUTPATIENT)
Dept: LAB | Facility: HOSPITAL | Age: 89
End: 2024-04-10
Payer: MEDICARE

## 2024-04-10 DIAGNOSIS — F02.A0 DEMENTIA IN OTHER DISEASES CLASSIFIED ELSEWHERE, MILD, WITHOUT BEHAVIORAL DISTURBANCE, PSYCHOTIC DISTURBANCE, MOOD DISTURBANCE, AND ANXIETY (HCC): ICD-10-CM

## 2024-04-10 DIAGNOSIS — N93.9 ABNORMAL UTERINE AND VAGINAL BLEEDING, UNSPECIFIED: ICD-10-CM

## 2024-04-10 LAB
ALBUMIN SERPL-MCNC: 3 G/DL (ref 3.4–5)
ALBUMIN/GLOB SERPL: 0.8 {RATIO} (ref 1–2)
ALP LIVER SERPL-CCNC: 87 U/L
ALT SERPL-CCNC: 20 U/L
ANION GAP SERPL CALC-SCNC: 5 MMOL/L (ref 0–18)
AST SERPL-CCNC: 30 U/L (ref 15–37)
BASOPHILS # BLD AUTO: 0.06 X10(3) UL (ref 0–0.2)
BASOPHILS NFR BLD AUTO: 1 %
BILIRUB SERPL-MCNC: 0.6 MG/DL (ref 0.1–2)
BILIRUB UR QL STRIP.AUTO: NEGATIVE
BUN BLD-MCNC: 30 MG/DL (ref 9–23)
CALCIUM BLD-MCNC: 8.9 MG/DL (ref 8.5–10.1)
CHLORIDE SERPL-SCNC: 107 MMOL/L (ref 98–112)
CLARITY UR REFRACT.AUTO: CLEAR
CO2 SERPL-SCNC: 30 MMOL/L (ref 21–32)
COLOR UR AUTO: YELLOW
CREAT BLD-MCNC: 1.15 MG/DL
EGFRCR SERPLBLD CKD-EPI 2021: 45 ML/MIN/1.73M2 (ref 60–?)
EOSINOPHIL # BLD AUTO: 0.28 X10(3) UL (ref 0–0.7)
EOSINOPHIL NFR BLD AUTO: 4.9 %
ERYTHROCYTE [DISTWIDTH] IN BLOOD BY AUTOMATED COUNT: 18.4 %
FASTING STATUS PATIENT QL REPORTED: NO
GLOBULIN PLAS-MCNC: 3.8 G/DL (ref 2.8–4.4)
GLUCOSE BLD-MCNC: 106 MG/DL (ref 70–99)
GLUCOSE UR STRIP.AUTO-MCNC: NEGATIVE MG/DL
HCT VFR BLD AUTO: 32.5 %
HGB BLD-MCNC: 10.2 G/DL
IMM GRANULOCYTES # BLD AUTO: 0.01 X10(3) UL (ref 0–1)
IMM GRANULOCYTES NFR BLD: 0.2 %
KETONES UR STRIP.AUTO-MCNC: NEGATIVE MG/DL
LYMPHOCYTES # BLD AUTO: 1.33 X10(3) UL (ref 1–4)
LYMPHOCYTES NFR BLD AUTO: 23.1 %
MCH RBC QN AUTO: 25.4 PG (ref 26–34)
MCHC RBC AUTO-ENTMCNC: 31.4 G/DL (ref 31–37)
MCV RBC AUTO: 81 FL
MONOCYTES # BLD AUTO: 0.58 X10(3) UL (ref 0.1–1)
MONOCYTES NFR BLD AUTO: 10.1 %
NEUTROPHILS # BLD AUTO: 3.49 X10 (3) UL (ref 1.5–7.7)
NEUTROPHILS # BLD AUTO: 3.49 X10(3) UL (ref 1.5–7.7)
NEUTROPHILS NFR BLD AUTO: 60.7 %
NITRITE UR QL STRIP.AUTO: NEGATIVE
OSMOLALITY SERPL CALC.SUM OF ELEC: 301 MOSM/KG (ref 275–295)
PH UR STRIP.AUTO: 7 [PH] (ref 5–8)
PLATELET # BLD AUTO: 215 10(3)UL (ref 150–450)
POTASSIUM SERPL-SCNC: 3.5 MMOL/L (ref 3.5–5.1)
PROT SERPL-MCNC: 6.8 G/DL (ref 6.4–8.2)
RBC # BLD AUTO: 4.01 X10(6)UL
SODIUM SERPL-SCNC: 142 MMOL/L (ref 136–145)
SP GR UR STRIP.AUTO: 1.02 (ref 1–1.03)
UROBILINOGEN UR STRIP.AUTO-MCNC: 0.2 MG/DL
WBC # BLD AUTO: 5.8 X10(3) UL (ref 4–11)

## 2024-04-10 PROCEDURE — 84443 ASSAY THYROID STIM HORMONE: CPT | Performed by: FAMILY MEDICINE

## 2024-04-10 PROCEDURE — 87086 URINE CULTURE/COLONY COUNT: CPT | Performed by: FAMILY MEDICINE

## 2024-04-10 PROCEDURE — 85025 COMPLETE CBC W/AUTO DIFF WBC: CPT | Performed by: FAMILY MEDICINE

## 2024-04-10 PROCEDURE — 81001 URINALYSIS AUTO W/SCOPE: CPT | Performed by: FAMILY MEDICINE

## 2024-04-10 PROCEDURE — 80053 COMPREHEN METABOLIC PANEL: CPT | Performed by: FAMILY MEDICINE

## 2024-04-10 PROCEDURE — 87186 SC STD MICRODIL/AGAR DIL: CPT | Performed by: FAMILY MEDICINE

## 2024-04-10 PROCEDURE — 81015 MICROSCOPIC EXAM OF URINE: CPT | Performed by: FAMILY MEDICINE

## 2024-04-10 PROCEDURE — 87088 URINE BACTERIA CULTURE: CPT | Performed by: FAMILY MEDICINE

## 2024-04-13 LAB — TSI SER-ACNC: 1.04 MIU/ML (ref 0.36–3.74)

## 2024-04-24 ENCOUNTER — LAB REQUISITION (OUTPATIENT)
Dept: LAB | Facility: HOSPITAL | Age: 89
End: 2024-04-24
Payer: MEDICARE

## 2024-04-24 DIAGNOSIS — D64.9 ANEMIA, UNSPECIFIED: ICD-10-CM

## 2024-04-24 DIAGNOSIS — N93.8 OTHER SPECIFIED ABNORMAL UTERINE AND VAGINAL BLEEDING: ICD-10-CM

## 2024-04-24 LAB
BASOPHILS # BLD AUTO: 0.05 X10(3) UL (ref 0–0.2)
BASOPHILS NFR BLD AUTO: 0.9 %
EOSINOPHIL # BLD AUTO: 0.27 X10(3) UL (ref 0–0.7)
EOSINOPHIL NFR BLD AUTO: 4.9 %
ERYTHROCYTE [DISTWIDTH] IN BLOOD BY AUTOMATED COUNT: 19.6 %
HCT VFR BLD AUTO: 34.1 %
HGB BLD-MCNC: 10.6 G/DL
IMM GRANULOCYTES # BLD AUTO: 0.01 X10(3) UL (ref 0–1)
IMM GRANULOCYTES NFR BLD: 0.2 %
IRON SERPL-MCNC: 33 UG/DL
LYMPHOCYTES # BLD AUTO: 0.98 X10(3) UL (ref 1–4)
LYMPHOCYTES NFR BLD AUTO: 17.9 %
MCH RBC QN AUTO: 24.8 PG (ref 26–34)
MCHC RBC AUTO-ENTMCNC: 31.1 G/DL (ref 31–37)
MCV RBC AUTO: 79.9 FL
MONOCYTES # BLD AUTO: 0.75 X10(3) UL (ref 0.1–1)
MONOCYTES NFR BLD AUTO: 13.7 %
NEUTROPHILS # BLD AUTO: 3.41 X10 (3) UL (ref 1.5–7.7)
NEUTROPHILS # BLD AUTO: 3.41 X10(3) UL (ref 1.5–7.7)
NEUTROPHILS NFR BLD AUTO: 62.4 %
PLATELET # BLD AUTO: 202 10(3)UL (ref 150–450)
RBC # BLD AUTO: 4.27 X10(6)UL
WBC # BLD AUTO: 5.5 X10(3) UL (ref 4–11)

## 2024-04-24 PROCEDURE — 83540 ASSAY OF IRON: CPT | Performed by: FAMILY MEDICINE

## 2024-04-24 PROCEDURE — 85025 COMPLETE CBC W/AUTO DIFF WBC: CPT | Performed by: FAMILY MEDICINE

## 2024-05-06 ENCOUNTER — E-VISIT (OUTPATIENT)
Dept: TELEHEALTH | Age: 89
End: 2024-05-06
Payer: MEDICARE

## 2024-05-06 ENCOUNTER — APPOINTMENT (OUTPATIENT)
Dept: ULTRASOUND IMAGING | Facility: HOSPITAL | Age: 89
End: 2024-05-06
Attending: EMERGENCY MEDICINE
Payer: MEDICARE

## 2024-05-06 ENCOUNTER — HOSPITAL ENCOUNTER (EMERGENCY)
Facility: HOSPITAL | Age: 89
Discharge: HOME | End: 2024-05-06
Attending: EMERGENCY MEDICINE
Payer: MEDICARE

## 2024-05-06 VITALS
SYSTOLIC BLOOD PRESSURE: 184 MMHG | BODY MASS INDEX: 26.68 KG/M2 | HEIGHT: 67 IN | HEART RATE: 70 BPM | OXYGEN SATURATION: 93 % | RESPIRATION RATE: 16 BRPM | DIASTOLIC BLOOD PRESSURE: 56 MMHG | TEMPERATURE: 98 F | WEIGHT: 170 LBS

## 2024-05-06 DIAGNOSIS — N93.9 ABNORMAL VAGINAL BLEEDING: Primary | ICD-10-CM

## 2024-05-06 DIAGNOSIS — Z02.9 ADMINISTRATIVE ENCOUNTER: Primary | ICD-10-CM

## 2024-05-06 DIAGNOSIS — D64.9 ANEMIA, UNSPECIFIED TYPE: ICD-10-CM

## 2024-05-06 LAB
ALBUMIN SERPL-MCNC: 2.9 G/DL (ref 3.4–5)
ALBUMIN/GLOB SERPL: 0.8 {RATIO} (ref 1–2)
ALP LIVER SERPL-CCNC: 76 U/L
ALT SERPL-CCNC: 18 U/L
ANION GAP SERPL CALC-SCNC: 5 MMOL/L (ref 0–18)
AST SERPL-CCNC: 28 U/L (ref 15–37)
BASOPHILS # BLD AUTO: 0.05 X10(3) UL (ref 0–0.2)
BASOPHILS NFR BLD AUTO: 0.6 %
BILIRUB SERPL-MCNC: 0.9 MG/DL (ref 0.1–2)
BUN BLD-MCNC: 29 MG/DL (ref 9–23)
CALCIUM BLD-MCNC: 9.2 MG/DL (ref 8.5–10.1)
CHLORIDE SERPL-SCNC: 111 MMOL/L (ref 98–112)
CO2 SERPL-SCNC: 25 MMOL/L (ref 21–32)
CREAT BLD-MCNC: 1.17 MG/DL
EGFRCR SERPLBLD CKD-EPI 2021: 44 ML/MIN/1.73M2 (ref 60–?)
EOSINOPHIL # BLD AUTO: 0.09 X10(3) UL (ref 0–0.7)
EOSINOPHIL NFR BLD AUTO: 1 %
ERYTHROCYTE [DISTWIDTH] IN BLOOD BY AUTOMATED COUNT: 20.2 %
GLOBULIN PLAS-MCNC: 3.8 G/DL (ref 2.8–4.4)
GLUCOSE BLD-MCNC: 162 MG/DL (ref 70–99)
HCT VFR BLD AUTO: 33 %
HGB BLD-MCNC: 10.2 G/DL
IMM GRANULOCYTES # BLD AUTO: 0.03 X10(3) UL (ref 0–1)
IMM GRANULOCYTES NFR BLD: 0.3 %
LYMPHOCYTES # BLD AUTO: 0.74 X10(3) UL (ref 1–4)
LYMPHOCYTES NFR BLD AUTO: 8.4 %
MCH RBC QN AUTO: 24.2 PG (ref 26–34)
MCHC RBC AUTO-ENTMCNC: 30.9 G/DL (ref 31–37)
MCV RBC AUTO: 78.4 FL
MONOCYTES # BLD AUTO: 0.64 X10(3) UL (ref 0.1–1)
MONOCYTES NFR BLD AUTO: 7.3 %
NEUTROPHILS # BLD AUTO: 7.22 X10 (3) UL (ref 1.5–7.7)
NEUTROPHILS # BLD AUTO: 7.22 X10(3) UL (ref 1.5–7.7)
NEUTROPHILS NFR BLD AUTO: 82.4 %
OSMOLALITY SERPL CALC.SUM OF ELEC: 301 MOSM/KG (ref 275–295)
PLATELET # BLD AUTO: 200 10(3)UL (ref 150–450)
POTASSIUM SERPL-SCNC: 4.2 MMOL/L (ref 3.5–5.1)
PROT SERPL-MCNC: 6.7 G/DL (ref 6.4–8.2)
RBC # BLD AUTO: 4.21 X10(6)UL
SODIUM SERPL-SCNC: 141 MMOL/L (ref 136–145)
WBC # BLD AUTO: 8.8 X10(3) UL (ref 4–11)

## 2024-05-06 PROCEDURE — 96360 HYDRATION IV INFUSION INIT: CPT

## 2024-05-06 PROCEDURE — 99285 EMERGENCY DEPT VISIT HI MDM: CPT

## 2024-05-06 PROCEDURE — 96361 HYDRATE IV INFUSION ADD-ON: CPT

## 2024-05-06 PROCEDURE — 85025 COMPLETE CBC W/AUTO DIFF WBC: CPT | Performed by: EMERGENCY MEDICINE

## 2024-05-06 PROCEDURE — 80053 COMPREHEN METABOLIC PANEL: CPT | Performed by: EMERGENCY MEDICINE

## 2024-05-06 PROCEDURE — 76856 US EXAM PELVIC COMPLETE: CPT | Performed by: EMERGENCY MEDICINE

## 2024-05-06 RX ORDER — ASPIRIN 81 MG/1
81 TABLET, CHEWABLE ORAL DAILY
COMMUNITY

## 2024-05-06 RX ORDER — SODIUM CHLORIDE 9 MG/ML
125 INJECTION, SOLUTION INTRAVENOUS CONTINUOUS
Status: DISCONTINUED | OUTPATIENT
Start: 2024-05-06 | End: 2024-05-06

## 2024-05-06 NOTE — ED INITIAL ASSESSMENT (HPI)
Pt to ED brought in by EMS from [facility] due to spotting over the last 2 weeks, pt is at baseline A&Ox1. Vital signs WDL per EMS.

## 2024-05-06 NOTE — ED PROVIDER NOTES
Patient Seen in: [facility] Emergency Department      History     Chief Complaint   Patient presents with    Eval-G     Stated Complaint: spotting, eval-g    Subjective:   HPI    Patient is a 89-year-old female with baseline is AO x 1.  Patient lives at [facility] in independent living with a caregiver.  Patient is not able to ambulate and needs Jeannine lift and is in a wheelchair.  Patient has had 2 to 3 weeks of vaginal bleeding spotting.  Last night the patient blood may be half a cup of red blood.  Patient has no abdominal pain, no vomiting, no diarrhea.  Patient has no GI bleeding or hematuria.  Patient brought in for further evaluation.  Remainder of review of systems negative.  Patient does not have a gynecologist has not had pelvic exam and a long time.    Objective:   Past Medical History:    Arrhythmia    Afib    Coronary atherosclerosis    Hearing impairment    jeffrey. HA    High cholesterol    Hx of CABG    Incontinence    Pacemaker              Past Surgical History:   Procedure Laterality Date    Cabg      Cardiac pacemaker placement      Hc insert or replace perm pacemaker ventricle  10/01/2021    Ndsc ablation & rcnstj atria lmtd w/o bypass                  Social History     Socioeconomic History    Marital status:    Tobacco Use    Smoking status: Never    Smokeless tobacco: Never   Vaping Use    Vaping status: Never Used   Substance and Sexual Activity    Alcohol use: Not Currently     Comment: 1 glass of wine a night.    Drug use: Never     Social Determinants of Health     Physical Activity: High Risk (12/4/2023)    Received from [facility]    Exercise Vital Sign     On average, how many days per week do you engage in moderate to strenuous exercise (like a brisk walk)?: 0 days     On average, how many minutes do you engage in exercise at this level?: 0 min    [facility], [facility]    Social Connections     Received from [facility], [facility]    Housing Stability              Review of Systems    Positive for stated complaint: spotting, eval-g  Other systems are as noted in HPI.  Constitutional and vital signs reviewed.      All other systems reviewed and negative except as noted above.    Physical Exam     ED Triage Vitals [05/06/24 0846]   /56   Pulse 70   Resp 20   Temp 97.8 °F (36.6 °C)   Temp src Temporal   SpO2 96 %   O2 Device None (Room air)       Current:BP (!) 184/56   Pulse 70   Temp 97.8 °F (36.6 °C) (Temporal)   Resp 16   Ht 170.2 cm (5' 7\")   Wt 77.1 kg   SpO2 93%   BMI 26.63 kg/m²         Physical Exam  GENERAL: Patient resting on the cart in no acute distress.  HEENT: Extraocular muscles intact, pupils equal round reactive to light and accommodation.  Mouth normal, neck supple, no meningismus.  LUNGS: Lungs clear to auscultation bilaterally.  CARDIOVASCULAR: + S1-S2, regular rate and rhythm, no murmurs.  BACK: No CVA tenderness, no midline bony tenderness.  ABDOMEN: + Bowel sounds, soft, nontender, nondistended.  No rebound, no guarding, no hepatosplenomegaly.  Pelvic exam small amount of red blood at the vaginal introitus.  Cleaned away no external bleeding noted.  Patient unable to tolerate speculum exam with smaller speculum.  Manual exam with 1 digit unable to tolerate secondary to pain.  EXTREMITIES: Full range of motion, no tenderness, good capillary refill.  SKIN: No rash, good turgor.  NEURO: Patient answers some questions appropriately.  Recognizes  and caregiver knows her name does not know the date no focal deficits appreciated.  Baseline according to            ED Course     Labs Reviewed   COMP METABOLIC PANEL (14) - Abnormal; Notable for the following components:       Result Value    Glucose 162 (*)     BUN 29 (*)     Creatinine 1.17 (*)     Calculated Osmolality 301 (*)     eGFR-Cr 44 (*)     Albumin 2.9 (*)     A/G  Ratio 0.8 (*)     All other components within normal limits   CBC W/ DIFFERENTIAL - Abnormal; Notable for the following components:    HGB 10.2 (*)     HCT 33.0 (*)     MCV 78.4 (*)     MCH 24.2 (*)     MCHC 30.9 (*)     Lymphocyte Absolute 0.74 (*)     All other components within normal limits   CBC WITH DIFFERENTIAL WITH PLATELET    Narrative:     The following orders were created for panel order CBC With Differential With Platelet.  Procedure                               Abnormality         Status                     ---------                               -----------         ------                     CBC W/ DIFFERENTIAL[[id number]]          Abnormal            Final result                 Please view results for these tests on the individual orders.   RAINBOW DRAW LAVENDER   RAINBOW DRAW LIGHT GREEN   RAINBOW DRAW BLUE   RAINBOW DRAW GOLD             Pelvic ultrasoundCONCLUSION:  There is abnormal thickening of endometrium which measures up to 1.3 cm.  Endometrial cancer could have this appearance.  Correlation with clinical findings and endometrial biopsy may be indicated.             MDM      Patient was stable during her stay in the emergency department.  Patient's hemoglobin stable at 10.  Patient ultrasound with thickened endometrium.  I did speak with OB/GYN who recommended have the patient follow-up in the office for further evaluation and discussion of next possible steps.  Discussed options with  and may wish to proceed with video visit as there is difficulty with transport.  Recommend return if new or worse symptoms.  Continue present medications.  I did consider endometrial cancer, dysfunctional uterine bleeding, vaginal laceration.                                 Medical Decision Making      Disposition and Plan     Clinical Impression:  1. Abnormal vaginal bleeding    2. Anemia, unspecified type         Disposition:  Discharge  5/6/2024 12:09 pm    Follow-up:  Pepe Oconnell DO  [address] [facility] [address]  [facility] [address]  [phone]    Follow up in 2 day(s)      Amanda Avila DO  [address] [facility] [address]  [facility] [id number] [phone]    Follow up in 2 day(s)            Medications Prescribed:  Discharge Medication List as of 5/6/2024  1:11 PM

## 2024-05-06 NOTE — DISCHARGE INSTRUCTIONS
Follow-up for further evaluation with gynecology.  Call today for an appointment.  Return if increased bleeding, new or worse symptoms.  Continue present medications.  Plenty of fluids.

## 2024-05-07 ENCOUNTER — TELEMEDICINE (OUTPATIENT)
Facility: CLINIC | Age: 89
End: 2024-05-07
Payer: MEDICARE

## 2024-05-07 DIAGNOSIS — N95.0 POSTMENOPAUSAL BLEEDING: Primary | ICD-10-CM

## 2024-05-07 PROCEDURE — 99203 OFFICE O/P NEW LOW 30 MIN: CPT | Performed by: STUDENT IN AN ORGANIZED HEALTH CARE EDUCATION/TRAINING PROGRAM

## 2024-05-07 NOTE — PROGRESS NOTES
Patient submitted EVisit last night but went to ED.  Has follow up appointment with OB Gyn at 10 am today.  Evisit closed out at no charge.

## 2024-05-07 NOTE — PROGRESS NOTES
[facility]  Obstetrics and Gynecology   History & Physical  VIDEO VISIT    Chief complaint: postmenopausal bleeding     HPI: Rosalia Peterson is a 89 year old No obstetric history on file. with No LMP recorded. Patient is postmenopausal.      Video visit requested by pt's family - mainly spoke with pt's  who is her power of   Pt is immobile, cannot get out of bed, requires stephanie lift for medical evaluation, and also has significant cognitive defects, per  pt can listen but can't respond, she had a stroke a few years ago and lives in nursing home    Has had 3 weeks of bleeding - patient's aide noticed blood while changing her and she did feel that she saw blood coming out of the patient's vagina (I.e. not in urine or from rectum)  Never happened before  Not a lot of bleeding - is mostly spotting  The aide took pt to ED 5/6/2024 for workup  She has chronic Fe deficiency anemia, Hgb was 10.2 (at baseline, previously had been 10.2 in early April)  They also did transabdominal pelvic US which measured endometrium at 1.3cm  Pt is on xarelto for afib      PCP: Pepe Oconnell DO    Review of Systems:  Unable to perform full ROS due to pt cognitive status as above    OB History:  OB History   No obstetric history on file.       Meds:  Current Outpatient Medications on File Prior to Visit   Medication Sig Dispense Refill    aspirin 81 MG Oral Chew Tab Chew 1 tablet (81 mg total) by mouth daily.      cefuroxime 250 MG Oral Tab Take 1 tablet (250 mg total) by mouth 2 (two) times daily. 10 tablet 0    Potassium Chloride ER 10 MEQ Oral Cap CR Take 2 capsules (20 mEq total) by mouth 2 (two) times daily.      amLODIPine 5 MG Oral Tab Take 1 tablet (5 mg total) by mouth daily.      acetaminophen (TYLENOL) 325 MG Oral Tab Take 2 tablets (650 mg total) by mouth every 4 (four) hours as needed for Pain.      HYDROcodone-acetaminophen 5-325 MG Oral Tab Take 1 tablet by mouth every 4 (four) hours as needed.  30 tablet 0    atorvastatin 20 MG Oral Tab Take 1 tablet (20 mg total) by mouth daily.      Cholecalciferol 50 MCG (2000 UT) Oral Tab Take 2,000 Int'l Units by mouth daily.      docusate sodium 100 MG Oral Cap Take 1 capsule (100 mg total) by mouth 2 (two) times daily.      PEG 3350 Oral Powd Pack Take 17 g by mouth daily as needed.      lidocaine 5 % External Patch Place 1 patch onto the skin daily.      furosemide 20 MG Oral Tab Take 2 tablets (40 mg total) by mouth daily.      rivaroxaban 15 MG Oral Tab Take 1 tablet (15 mg total) by mouth daily with food.      predniSONE 5 MG Oral Tab Take 1 tablet (5 mg total) by mouth daily.       No current facility-administered medications on file prior to visit.       All:  Allergies   Allergen Reactions    Dabigatran OTHER (SEE COMMENTS)     Oral, epistaxis       PMH:  Past Medical History:    Arrhythmia    Afib    Coronary atherosclerosis    Hearing impairment    jeffrey. HA    High cholesterol    Hx of CABG    Incontinence    Pacemaker       PSH:  Past Surgical History:   Procedure Laterality Date    Cabg      Cardiac pacemaker placement      Hc insert or replace perm pacemaker ventricle  10/01/2021    Ndsc ablation & rcnstj atria lmtd w/o bypass         Social History:  Social History     Socioeconomic History    Marital status:      Spouse name: Not on file    Number of children: Not on file    Years of education: Not on file    Highest education level: Not on file   Occupational History    Not on file   Tobacco Use    Smoking status: Never    Smokeless tobacco: Never   Vaping Use    Vaping status: Never Used   Substance and Sexual Activity    Alcohol use: Not Currently     Comment: 1 glass of wine a night.    Drug use: Never    Sexual activity: Not on file   Other Topics Concern    Not on file   Social History Narrative    Not on file     Social Determinants of Health     Financial Resource Strain: Not on file   Food Insecurity: Not on file   Transportation Needs:  Not on file   Physical Activity: High Risk (12/4/2023)    Received from [facility]    Exercise Vital Sign     On average, how many days per week do you engage in moderate to strenuous exercise (like a brisk walk)?: 0 days     On average, how many minutes do you engage in exercise at this level?: 0 min   Stress: Not on file   Social Connections: Unknown (3/14/2021)    [facility], [facility]    Social Connections     Conversations with friends/family/neighbors per week: Not on file   Housing Stability: Low Risk  (7/10/2021)    Received from [facility], [facility]    Housing Stability     Mortgage Payment Concerns?: Not on file     Number of Places Lived in the Last Year: Not on file     Unstable Housing?: Not on file        Family History:  No family history on file.    PHYSICAL EXAM:   There were no vitals filed for this visit.    There is no height or weight on file to calculate BMI.      Exam deferred - video visit    Assessment & Plan:     Rosalia Peterson is a 89 year old No obstetric history on file.     Diagnoses and all orders for this visit:    Postmenopausal bleeding  -     norethindrone 5 MG Oral Tab; Take 1 tablet (5 mg total) by mouth nightly.       Discussed that any bleeding after menopause is abnormal  Reviewed pt's ultrasound images personally, discussed finding of thickened endometrium, discussed possible etiologies including benign endometrial polyp vs endometrial hyperplasia or carcinoma  D/w POA that typically the next step would be either endometrial biopsy in clinic vs hysteroscopy, D&C in OR    Pt is medically frail, immobile and unable to communicate. They would not want to put the pt through the stress of surgery, even if she was diagnosed with endometrial cancer they stated they would not pursue hysterectomy   They would like to try medical management if that would help stop the bleeding,  just to control sx - rx norethindrone. Advised that this would not prevent or treat endometrial cancer if present but is a reasonable option to simply try stopping the bleeding     They will check in in a month or so. Advised that if norethindrone is stopped that this alone could precipitate \"withdrawal\" bleeding, to be aware      Aurora Ott MD  [facility]

## 2024-06-06 ENCOUNTER — ANCILLARY PROCEDURE (OUTPATIENT)
Dept: CARDIOLOGY | Age: 89
End: 2024-06-06
Attending: INTERNAL MEDICINE

## 2024-06-06 ENCOUNTER — ANCILLARY ORDERS (OUTPATIENT)
Dept: CARDIOLOGY | Age: 89
End: 2024-06-06

## 2024-06-06 DIAGNOSIS — Z95.0 PRESENCE OF CARDIAC PACEMAKER: Primary | ICD-10-CM

## 2024-06-06 DIAGNOSIS — Z95.0 PRESENCE OF CARDIAC PACEMAKER: ICD-10-CM

## 2024-06-06 LAB
MDC_IDC_LEAD_CONNECTION_STATUS: NORMAL
MDC_IDC_LEAD_CONNECTION_STATUS: NORMAL
MDC_IDC_LEAD_IMPLANT_DT: NORMAL
MDC_IDC_LEAD_IMPLANT_DT: NORMAL
MDC_IDC_LEAD_LOCATION: NORMAL
MDC_IDC_LEAD_LOCATION: NORMAL
MDC_IDC_LEAD_LOCATION_DETAIL_1: NORMAL
MDC_IDC_LEAD_LOCATION_DETAIL_1: NORMAL
MDC_IDC_LEAD_MFG: NORMAL
MDC_IDC_LEAD_MFG: NORMAL
MDC_IDC_LEAD_MODEL: NORMAL
MDC_IDC_LEAD_MODEL: NORMAL
MDC_IDC_LEAD_POLARITY_TYPE: NORMAL
MDC_IDC_LEAD_POLARITY_TYPE: NORMAL
MDC_IDC_LEAD_SERIAL: NORMAL
MDC_IDC_LEAD_SERIAL: NORMAL
MDC_IDC_PG_IMPLANT_DTM: NORMAL
MDC_IDC_PG_MFG: NORMAL
MDC_IDC_PG_MODEL: NORMAL
MDC_IDC_PG_SERIAL: NORMAL
MDC_IDC_PG_TYPE: NORMAL
MDC_IDC_SESS_CLINIC_NAME: NORMAL
MDC_IDC_SESS_TYPE: NORMAL

## 2024-06-06 PROCEDURE — 93294 REM INTERROG EVL PM/LDLS PM: CPT | Performed by: INTERNAL MEDICINE

## 2024-06-23 ENCOUNTER — HOSPITAL ENCOUNTER (INPATIENT)
Facility: HOSPITAL | Age: 89
LOS: 3 days | Discharge: HOME | End: 2024-06-27
Attending: EMERGENCY MEDICINE | Admitting: HOSPITALIST
Payer: MEDICARE

## 2024-06-23 ENCOUNTER — APPOINTMENT (OUTPATIENT)
Dept: GENERAL RADIOLOGY | Facility: HOSPITAL | Age: 89
End: 2024-06-23
Attending: EMERGENCY MEDICINE
Payer: MEDICARE

## 2024-06-23 DIAGNOSIS — I50.9 ACUTE CONGESTIVE HEART FAILURE, UNSPECIFIED HEART FAILURE TYPE (HCC): Primary | ICD-10-CM

## 2024-06-23 LAB
ALBUMIN SERPL-MCNC: 2.8 G/DL (ref 3.4–5)
ALBUMIN/GLOB SERPL: 0.7 {RATIO} (ref 1–2)
ALP LIVER SERPL-CCNC: 57 U/L
ALT SERPL-CCNC: 92 U/L
ANION GAP SERPL CALC-SCNC: 5 MMOL/L (ref 0–18)
AST SERPL-CCNC: 70 U/L (ref 15–37)
ATRIAL RATE: 54 BPM
BASOPHILS # BLD AUTO: 0.08 X10(3) UL (ref 0–0.2)
BASOPHILS NFR BLD AUTO: 1 %
BILIRUB SERPL-MCNC: 0.7 MG/DL (ref 0.1–2)
BUN BLD-MCNC: 21 MG/DL (ref 9–23)
CALCIUM BLD-MCNC: 8.8 MG/DL (ref 8.5–10.1)
CHLORIDE SERPL-SCNC: 112 MMOL/L (ref 98–112)
CO2 SERPL-SCNC: 26 MMOL/L (ref 21–32)
CREAT BLD-MCNC: 1.17 MG/DL
DEPRECATED HBV CORE AB SER IA-ACNC: 53 NG/ML
EGFRCR SERPLBLD CKD-EPI 2021: 44 ML/MIN/1.73M2 (ref 60–?)
EOSINOPHIL # BLD AUTO: 0.07 X10(3) UL (ref 0–0.7)
EOSINOPHIL NFR BLD AUTO: 0.9 %
ERYTHROCYTE [DISTWIDTH] IN BLOOD BY AUTOMATED COUNT: 21.6 %
FLUAV + FLUBV RNA SPEC NAA+PROBE: NEGATIVE
FLUAV + FLUBV RNA SPEC NAA+PROBE: NEGATIVE
GLOBULIN PLAS-MCNC: 4.3 G/DL (ref 2.8–4.4)
GLUCOSE BLD-MCNC: 137 MG/DL (ref 70–99)
HCT VFR BLD AUTO: 30.6 %
HGB BLD-MCNC: 9.4 G/DL
IMM GRANULOCYTES # BLD AUTO: 0.02 X10(3) UL (ref 0–1)
IMM GRANULOCYTES NFR BLD: 0.3 %
IRON SATN MFR SERPL: 6 %
IRON SERPL-MCNC: 25 UG/DL
LYMPHOCYTES # BLD AUTO: 0.48 X10(3) UL (ref 1–4)
LYMPHOCYTES NFR BLD AUTO: 6.1 %
MCH RBC QN AUTO: 23.4 PG (ref 26–34)
MCHC RBC AUTO-ENTMCNC: 30.7 G/DL (ref 31–37)
MCV RBC AUTO: 76.3 FL
MONOCYTES # BLD AUTO: 0.56 X10(3) UL (ref 0.1–1)
MONOCYTES NFR BLD AUTO: 7.2 %
NEUTROPHILS # BLD AUTO: 6.62 X10 (3) UL (ref 1.5–7.7)
NEUTROPHILS # BLD AUTO: 6.62 X10(3) UL (ref 1.5–7.7)
NEUTROPHILS NFR BLD AUTO: 84.5 %
NT-PROBNP SERPL-MCNC: 5881 PG/ML (ref ?–450)
OSMOLALITY SERPL CALC.SUM OF ELEC: 301 MOSM/KG (ref 275–295)
PLATELET # BLD AUTO: 289 10(3)UL (ref 150–450)
POTASSIUM SERPL-SCNC: 4.5 MMOL/L (ref 3.5–5.1)
PROT SERPL-MCNC: 7.1 G/DL (ref 6.4–8.2)
Q-T INTERVAL: 442 MS
QRS DURATION: 152 MS
QTC CALCULATION (BEZET): 480 MS
R AXIS: -66 DEGREES
RBC # BLD AUTO: 4.01 X10(6)UL
RSV RNA SPEC NAA+PROBE: NEGATIVE
SARS-COV-2 RNA RESP QL NAA+PROBE: NOT DETECTED
SODIUM SERPL-SCNC: 143 MMOL/L (ref 136–145)
T AXIS: 105 DEGREES
TIBC SERPL-MCNC: 408 UG/DL (ref 240–450)
TRANSFERRIN SERPL-MCNC: 274 MG/DL (ref 200–360)
VENTRICULAR RATE: 71 BPM
WBC # BLD AUTO: 7.8 X10(3) UL (ref 4–11)

## 2024-06-23 PROCEDURE — 99223 1ST HOSP IP/OBS HIGH 75: CPT | Performed by: HOSPITALIST

## 2024-06-23 PROCEDURE — 71045 X-RAY EXAM CHEST 1 VIEW: CPT | Performed by: EMERGENCY MEDICINE

## 2024-06-23 RX ORDER — FUROSEMIDE 10 MG/ML
40 INJECTION INTRAMUSCULAR; INTRAVENOUS
Status: DISCONTINUED | OUTPATIENT
Start: 2024-06-23 | End: 2024-06-26

## 2024-06-23 RX ORDER — PREDNISONE 5 MG/1
5 TABLET ORAL DAILY
Status: DISCONTINUED | OUTPATIENT
Start: 2024-06-24 | End: 2024-06-27

## 2024-06-23 RX ORDER — ECHINACEA PURPUREA EXTRACT 125 MG
1 TABLET ORAL
Status: DISCONTINUED | OUTPATIENT
Start: 2024-06-23 | End: 2024-06-27

## 2024-06-23 RX ORDER — ASPIRIN 81 MG/1
81 TABLET, CHEWABLE ORAL DAILY
Status: DISCONTINUED | OUTPATIENT
Start: 2024-06-24 | End: 2024-06-27

## 2024-06-23 RX ORDER — ONDANSETRON 2 MG/ML
8 INJECTION INTRAMUSCULAR; INTRAVENOUS EVERY 6 HOURS PRN
Status: DISCONTINUED | OUTPATIENT
Start: 2024-06-23 | End: 2024-06-27

## 2024-06-23 RX ORDER — POLYETHYLENE GLYCOL 3350 17 G/17G
17 POWDER, FOR SOLUTION ORAL DAILY PRN
Status: DISCONTINUED | OUTPATIENT
Start: 2024-06-23 | End: 2024-06-27

## 2024-06-23 RX ORDER — ATORVASTATIN CALCIUM 20 MG/1
20 TABLET, FILM COATED ORAL DAILY
Status: DISCONTINUED | OUTPATIENT
Start: 2024-06-24 | End: 2024-06-27

## 2024-06-23 RX ORDER — FUROSEMIDE 10 MG/ML
40 INJECTION INTRAMUSCULAR; INTRAVENOUS ONCE
Status: COMPLETED | OUTPATIENT
Start: 2024-06-23 | End: 2024-06-23

## 2024-06-23 RX ORDER — METOPROLOL TARTRATE 1 MG/ML
5 INJECTION, SOLUTION INTRAVENOUS ONCE
Status: COMPLETED | OUTPATIENT
Start: 2024-06-24 | End: 2024-06-23

## 2024-06-23 RX ORDER — MELATONIN
3 NIGHTLY PRN
Status: DISCONTINUED | OUTPATIENT
Start: 2024-06-23 | End: 2024-06-27

## 2024-06-23 RX ORDER — SENNOSIDES 8.6 MG
17.2 TABLET ORAL NIGHTLY PRN
Status: DISCONTINUED | OUTPATIENT
Start: 2024-06-23 | End: 2024-06-27

## 2024-06-23 RX ORDER — BISACODYL 10 MG
10 SUPPOSITORY, RECTAL RECTAL
Status: DISCONTINUED | OUTPATIENT
Start: 2024-06-23 | End: 2024-06-27

## 2024-06-23 RX ORDER — BENZONATATE 200 MG/1
200 CAPSULE ORAL 3 TIMES DAILY PRN
Status: DISCONTINUED | OUTPATIENT
Start: 2024-06-23 | End: 2024-06-27

## 2024-06-23 RX ORDER — ACETAMINOPHEN 500 MG
500 TABLET ORAL EVERY 4 HOURS PRN
Status: DISCONTINUED | OUTPATIENT
Start: 2024-06-23 | End: 2024-06-27

## 2024-06-23 NOTE — ED QUICK NOTES
Orders for admission, patient is aware of plan and ready to go upstairs. Any questions, please call ED RN Елена at extension [phone].     Patient Covid vaccination status: Partially vaccinated     COVID Test Ordered in ED: SARS-CoV-2/Flu A and B/RSV by PCR (GeneXpert)    COVID Suspicion at Admission: N/A    Running Infusions:  None    Mental Status/LOC at time of transport: AO2/3    Other pertinent information:   CIWA score: N/A   NIH score:  N/A

## 2024-06-23 NOTE — PROGRESS NOTES
NURSING ADMISSION NOTE      Patient admitted via Cart  Oriented to room.  Safety precautions initiated.  Bed in low position.  Call light in reach.    Admission navigator completed with patient and her . Alert and oriented to self. On room air with adequate O2 saturations. V paced on tele. Denies pain. Incontinent, purewick in place. Bed bound at baseline. Patient and  updated on plan of care, verbalized understanding.    Skin check completed with Allison BENDER, skin is intact, heels slightly pink, pillow placed underneath them.

## 2024-06-23 NOTE — ED INITIAL ASSESSMENT (HPI)
Patient to ED via [facility] from [facility] for r/o SOB x1 week. Patient has no complaints today. States she was SOB the whole week but today is the first day she feels ok.

## 2024-06-23 NOTE — H&P
[facility]     Rosalia Peterson Patient Status:  Emergency    2/3/1932 MRN [id number]   Location [facility] EMERGENCY DEPARTMENT Attending Estrellita Sykes MD   Hosp Day # 0 PCP Pepe Oconnell DO     Chief Complaint: dyspnea    Subjective:    History of Present Illness:     Rosalia Peterson is a 89 year old female with several days of dyspnea.  She cannot give much history but  says she has been visibly dyspneic last day or two.  She has not complained of pain, nausea, vomiting, diarrhea, fevers, chills, or cough.    History/Other:    Past Medical History:  Past Medical History:    Arrhythmia    Afib    Coronary atherosclerosis    Hearing impairment    jeffrey. HA    High cholesterol    Hx of CABG    Incontinence    Pacemaker     Past Surgical History:   Past Surgical History:   Procedure Laterality Date    Cabg      Cardiac pacemaker placement      Hc insert or replace perm pacemaker ventricle  10/01/2021    Ndsc ablation & rcnstj atria lmtd w/o bypass        Family History:   History reviewed. No pertinent family history.    hypertension Social History:    reports that she has never smoked. She has never used smokeless tobacco. She reports that she does not currently use alcohol. She reports that she does not use drugs.     Allergies:   Allergies   Allergen Reactions    Dabigatran OTHER (SEE COMMENTS)     Oral, epistaxis       Medications:    No current facility-administered medications on file prior to encounter.     Current Outpatient Medications on File Prior to Encounter   Medication Sig Dispense Refill    norethindrone 5 MG Oral Tab Take 1 tablet (5 mg total) by mouth nightly. 90 tablet 3    aspirin 81 MG Oral Chew Tab Chew 1 tablet (81 mg total) by mouth daily.      cefuroxime 250 MG Oral Tab Take 1 tablet (250 mg total) by mouth 2 (two) times daily. 10 tablet 0    Potassium Chloride ER 10 MEQ Oral Cap CR Take 2 capsules (20 mEq total) by mouth 2 (two) times daily.       amLODIPine 5 MG Oral Tab Take 1 tablet (5 mg total) by mouth daily.      acetaminophen (TYLENOL) 325 MG Oral Tab Take 2 tablets (650 mg total) by mouth every 4 (four) hours as needed for Pain.      HYDROcodone-acetaminophen 5-325 MG Oral Tab Take 1 tablet by mouth every 4 (four) hours as needed. 30 tablet 0    atorvastatin 20 MG Oral Tab Take 1 tablet (20 mg total) by mouth daily.      Cholecalciferol 50 MCG (2000 UT) Oral Tab Take 2,000 Int'l Units by mouth daily.      docusate sodium 100 MG Oral Cap Take 1 capsule (100 mg total) by mouth 2 (two) times daily.      PEG 3350 Oral Powd Pack Take 17 g by mouth daily as needed.      lidocaine 5 % External Patch Place 1 patch onto the skin daily.      furosemide 20 MG Oral Tab Take 2 tablets (40 mg total) by mouth daily.      rivaroxaban 15 MG Oral Tab Take 1 tablet (15 mg total) by mouth daily with food.      predniSONE 5 MG Oral Tab Take 1 tablet (5 mg total) by mouth daily.         Review of Systems:   A comprehensive 12 point review of systems was completed.    Pertinent positives and negatives noted in the HPI.    Objective:   Physical Exam:    BP (!) 155/105   Pulse 69   Temp 98.3 °F (36.8 °C) (Temporal)   Resp (!) 27   Ht 5' 3\" (1.6 m)   Wt 169 lb 12.1 oz (77 kg)   SpO2 97%   BMI 30.07 kg/m²   General: confused  Respiratory: No rhonchi, no wheezes  Cardiovascular: S1, S2. Regular rate and rhythm  Abdomen: Soft, NT/ND, +BS  Neuro: No new focal deficits  Extremities: trace LE edema      Results:    Labs:      Labs Last 24 Hours:    Recent Labs   Lab 06/23/24  1243   RBC 4.01   HGB 9.4*   HCT 30.6*   MCV 76.3*   MCH 23.4*   MCHC 30.7*   RDW 21.6   NEPRELIM 6.62   WBC 7.8   .0       Recent Labs   Lab 06/23/24  1249   *   BUN 21   CREATSERUM 1.17*   EGFRCR 44*   CA 8.8   ALB 2.8*      K 4.5      CO2 26.0   ALKPHO 57   AST 70*   ALT 92*   BILT 0.7   TP 7.1       Lab Results   Component Value Date    INR 1.70 (H) 08/17/2023    INR 1.28  (H) 10/02/2021    INR 1.42 (H) 06/10/2020       No results for input(s): \"TROP\", \"TROPHS\", \"CK\" in the last 168 hours.    Recent Labs   Lab 06/23/24  1249   PBNP 5,881*       No results for input(s): \"PCT\" in the last 168 hours.    Imaging: Imaging data reviewed in Epic.    Assessment & Plan:      #acute HFpEF exacerbation; repeat echo; cards to see; IV lasix; monitor lytes  #elevated lfts; likely from congested hepatopathy; monitor  #anemia-monitor; check iron stores  # CAD with h/o CABG  # PPM  # AAA  # s/p bioprosthetic AVR  #Pulm HTN  # PMR on chronic steroids   # CKD stage III     Will do med rec once review complete.    Quality:  DVT prophylaxis: resume doac once meds reviewed  Code Status: see chart  Tsang: NO  Tsang Duration (in days): N/A  Central line: NO  Estimated discharge date: tbd    Plan of care discussed with patient and ER MD Bobby Bailon MD    Supplementary Documentation:

## 2024-06-23 NOTE — ED PROVIDER NOTES
Patient Seen in: [facility] Emergency Department      History     Chief Complaint   Patient presents with    Cough/URI     Stated Complaint: SOB x1 week, no complaints today.    Subjective:   HPI    Patient is demented, bedbound history is obtained from  at bedside.  She he has been having worsening shortness of breath for the last week.  No fevers or chills no cough.  This morning look like even with rest she was very short of breath.    Compliant with Lasix and anticoagulants medical history as noted below, reports multiple valve replacements and CABG      Objective:   Past Medical History:    Arrhythmia    Afib    Coronary atherosclerosis    Hearing impairment    jeffrey. HA    High cholesterol    Hx of CABG    Incontinence    Pacemaker              Past Surgical History:   Procedure Laterality Date    Cabg      Cardiac pacemaker placement      Hc insert or replace perm pacemaker ventricle  10/01/2021    Ndsc ablation & rcnstj atria lmtd w/o bypass                  Social History     Socioeconomic History    Marital status:    Tobacco Use    Smoking status: Never    Smokeless tobacco: Never   Vaping Use    Vaping status: Never Used   Substance and Sexual Activity    Alcohol use: Not Currently     Comment: 1 glass of wine a night.    Drug use: Never     Social Determinants of Health     Physical Activity: High Risk (12/4/2023)    Received from [facility]    Exercise Vital Sign     On average, how many days per week do you engage in moderate to strenuous exercise (like a brisk walk)?: 0 days     On average, how many minutes do you engage in exercise at this level?: 0 min    Received from [facility], [facility]    Social Connections    Received from [facility], [facility]    Housing Stability              Review of Systems    Positive for stated complaint: SOB x1 week, no complaints today.  Other systems are  as noted in HPI.  Constitutional and vital signs reviewed.      All other systems reviewed and negative except as noted above.    Physical Exam     ED Triage Vitals [06/23/24 1230]   BP (!) 172/52   Pulse 73   Resp 22   Temp 98.3 °F (36.8 °C)   Temp src Temporal   SpO2 98 %   O2 Device None (Room air)       Current Vitals:   Vital Signs  BP: (!) 155/105  Pulse: 69  Resp: (!) 27  Temp: 98.3 °F (36.8 °C)  Temp src: Temporal  MAP (mmHg): (!) 121    Oxygen Therapy  SpO2: 97 %  O2 Device: None (Room air)            Physical Exam    Physical Exam   Constitutional: Sleeping but wakes easily, notably tachypneic  Head: Normocephalic and atraumatic.   Eyes: Conjunctivae are normal. Pupils are equal, round, and reactive to light.   Neck: Normal range of motion. Neck supple.   Cardiovascular: Normal rate, regular rhythm  Pulmonary/Chest: Tachypneic but not struggling, adequate air exchange bibasilar crackles noted.  Abdominal: Soft. Bowel sounds are normal.   Neurological: Awake and oriented to person but not place time or situation.  Will move all 4 extremities  Skin: Skin is warm and dry.    ED Course     Labs Reviewed   COMP METABOLIC PANEL (14) - Abnormal; Notable for the following components:       Result Value    Glucose 137 (*)     Creatinine 1.17 (*)     Calculated Osmolality 301 (*)     eGFR-Cr 44 (*)     AST 70 (*)     ALT 92 (*)     Albumin 2.8 (*)     A/G Ratio 0.7 (*)     All other components within normal limits   PRO BETA NATRIURETIC PEPTIDE - Abnormal; Notable for the following components:    Pro-Beta Natriuretic Peptide 5,881 (*)     All other components within normal limits   CBC W/ DIFFERENTIAL - Abnormal; Notable for the following components:    HGB 9.4 (*)     HCT 30.6 (*)     MCV 76.3 (*)     MCH 23.4 (*)     MCHC 30.7 (*)     Lymphocyte Absolute 0.48 (*)     All other components within normal limits   SARS-COV-2/FLU A AND B/RSV BY PCR (GENEXPERT) - Normal    Narrative:     This test is intended for the  qualitative detection and differentiation of SARS-CoV-2, influenza A, influenza B, and respiratory syncytial virus (RSV) viral RNA in nasopharyngeal or nares swabs from individuals suspected of respiratory viral infection consistent with COVID-19 by their healthcare provider. Signs and symptoms of respiratory viral infection due to SARS-CoV-2, influenza, and RSV can be similar.    Test performed using the Xpert Xpress SARS-CoV-2/FLU/RSV (real time RT-PCR)  assay on the GeneXpert instrument, [employer], [place] [address].   This test is being used under the Food and Drug Administration's Emergency Use Authorization.    The authorized Fact Sheet for Healthcare Providers for this assay is available upon request from the laboratory.   CBC WITH DIFFERENTIAL WITH PLATELET    Narrative:     The following orders were created for panel order CBC With Differential With Platelet.  Procedure                               Abnormality         Status                     ---------                               -----------         ------                     CBC W/ DIFFERENTIAL[[id number]]          Abnormal            Final result                 Please view results for these tests on the individual orders.   RAINBOW DRAW LAVENDER   RAINBOW DRAW LIGHT GREEN   RAINBOW DRAW BLUE   RAINBOW DRAW GOLD     EKG    Rate, intervals and axes as noted on EKG Report.  Rate: 71  Rhythm: Paced rhythm  Reading: Paced rhythm                 Blood work noted notable for elevated proBNP, viral swab negative, chest x-ray clear    XR CHEST AP PORTABLE  (CPT=71045)    Result Date: 6/23/2024  CONCLUSION:   Findings are suggestive of fluid overload/CHF.   LOCATION:  [facility]      Dictated by (CST): Emile Bello MD on 6/23/2024 at 2:13 PM     Finalized by (CST): Emile Bello MD on 6/23/2024 at 2:14 PM               MDM          Differential diagnoses considered: Heart failure, pneumonia, atypical presentation of ACS all considered    -Overall  findings concerning for heart failure possibly with a pleural effusion.  -Patient has been compliant with Lasix 40 twice daily at home.  Given her age and comorbidities as well as social support (patient lives with her  who is similarly aged, there is no additional support for her), and is reasonable admitted for diuresis.    Patient will be admitted primarily to the [facility].    Care has been discussed with the admitting physician.      I visualized the radiology studies, my independent interpretation: Findings consistent with heart failure, possibly a pleural effusion on the right.    *Discussion of ongoing management of this patient's care included: Admitting physician  *Comorbidities contributing to the complexity of decision making: cardiovascular disease  *External charts reviewed: n/a  *Additional sources of history: n/a    Shared decision making was done by: patient, myself.    Admission disposition: 6/23/2024  3:28 PM                                        Medical Decision Making      Disposition and Plan     Clinical Impression:  1. Acute congestive heart failure, unspecified heart failure type (HCC)         Disposition:  Admit  6/23/2024  3:28 pm    Follow-up:  No follow-up provider specified.        Medications Prescribed:  Current Discharge Medication List                            Hospital Problems       Present on Admission  Date Reviewed: 5/8/2024            ICD-10-CM Noted POA    * (Principal) Acute congestive heart failure, unspecified heart failure type (HCC) I50.9 6/23/2024 Unknown

## 2024-06-24 ENCOUNTER — APPOINTMENT (OUTPATIENT)
Dept: CV DIAGNOSTICS | Facility: HOSPITAL | Age: 89
End: 2024-06-24
Attending: HOSPITALIST
Payer: MEDICARE

## 2024-06-24 PROBLEM — D50.9 IRON DEFICIENCY ANEMIA: Status: ACTIVE | Noted: 2024-06-24

## 2024-06-24 PROBLEM — N93.9 VAGINAL BLEEDING: Status: ACTIVE | Noted: 2024-06-24

## 2024-06-24 LAB
ALBUMIN SERPL-MCNC: 2.6 G/DL (ref 3.4–5)
ALBUMIN/GLOB SERPL: 0.7 {RATIO} (ref 1–2)
ALP LIVER SERPL-CCNC: 49 U/L
ALT SERPL-CCNC: 124 U/L
ANION GAP SERPL CALC-SCNC: 6 MMOL/L (ref 0–18)
AST SERPL-CCNC: 90 U/L (ref 15–37)
BILIRUB SERPL-MCNC: 0.6 MG/DL (ref 0.1–2)
BUN BLD-MCNC: 21 MG/DL (ref 9–23)
CALCIUM BLD-MCNC: 8.5 MG/DL (ref 8.5–10.1)
CHLORIDE SERPL-SCNC: 113 MMOL/L (ref 98–112)
CO2 SERPL-SCNC: 28 MMOL/L (ref 21–32)
CREAT BLD-MCNC: 1.07 MG/DL
EGFRCR SERPLBLD CKD-EPI 2021: 49 ML/MIN/1.73M2 (ref 60–?)
ERYTHROCYTE [DISTWIDTH] IN BLOOD BY AUTOMATED COUNT: 21.5 %
GLOBULIN PLAS-MCNC: 3.9 G/DL (ref 2.8–4.4)
GLUCOSE BLD-MCNC: 83 MG/DL (ref 70–99)
HCT VFR BLD AUTO: 28.9 %
HGB BLD-MCNC: 8.6 G/DL
MAGNESIUM SERPL-MCNC: 2.5 MG/DL (ref 1.6–2.6)
MCH RBC QN AUTO: 22.7 PG (ref 26–34)
MCHC RBC AUTO-ENTMCNC: 29.8 G/DL (ref 31–37)
MCV RBC AUTO: 76.3 FL
OSMOLALITY SERPL CALC.SUM OF ELEC: 306 MOSM/KG (ref 275–295)
PLATELET # BLD AUTO: 250 10(3)UL (ref 150–450)
POTASSIUM SERPL-SCNC: 3.5 MMOL/L (ref 3.5–5.1)
POTASSIUM SERPL-SCNC: 5 MMOL/L (ref 3.5–5.1)
PROT SERPL-MCNC: 6.5 G/DL (ref 6.4–8.2)
RBC # BLD AUTO: 3.79 X10(6)UL
SODIUM SERPL-SCNC: 147 MMOL/L (ref 136–145)
WBC # BLD AUTO: 7.8 X10(3) UL (ref 4–11)

## 2024-06-24 PROCEDURE — 99233 SBSQ HOSP IP/OBS HIGH 50: CPT | Performed by: HOSPITALIST

## 2024-06-24 PROCEDURE — 93306 TTE W/DOPPLER COMPLETE: CPT | Performed by: HOSPITALIST

## 2024-06-24 RX ORDER — POTASSIUM CHLORIDE 20 MEQ/1
40 TABLET, EXTENDED RELEASE ORAL EVERY 4 HOURS
Status: COMPLETED | OUTPATIENT
Start: 2024-06-24 | End: 2024-06-24

## 2024-06-24 RX ORDER — AMLODIPINE BESYLATE 5 MG/1
5 TABLET ORAL DAILY
Status: DISCONTINUED | OUTPATIENT
Start: 2024-06-24 | End: 2024-06-25

## 2024-06-24 RX ORDER — FERROUS SULFATE 325(65) MG
325 TABLET, DELAYED RELEASE (ENTERIC COATED) ORAL
Status: DISCONTINUED | OUTPATIENT
Start: 2024-06-24 | End: 2024-06-27

## 2024-06-24 RX ORDER — HYDRALAZINE HYDROCHLORIDE 25 MG/1
25 TABLET, FILM COATED ORAL EVERY 8 HOURS SCHEDULED
Status: DISCONTINUED | OUTPATIENT
Start: 2024-06-24 | End: 2024-06-26

## 2024-06-24 NOTE — OCCUPATIONAL THERAPY NOTE
OT orders received and chart reviewed. Per chart review and confirmation with pt. POA pt. Is dependent at baseline for all ADLs (toileting and LB dressing at bed level) and bed mobility. Pt. Uses hospital bed, stephanie lift, and wheelchair at home. Pt. Has caregiver and spouse assisting with ADLs, transfers, and bed mobility.

## 2024-06-24 NOTE — SLP NOTE
ADULT SWALLOWING EVALUATION    ASSESSMENT    ASSESSMENT/OVERALL IMPRESSION:  Patient is a 89 y/o female admitted with dyspnea and PMHx significant for CKD, HTN, and CAD. SLP order received to evaluate oropharyngeal swallow d/t coughing noted with PO intake. Patient received drowsy in bed with  present at bedside. Patient's  reported occasional coughing with PO intake that he feels has worsened in the past few weeks. Patient consumes a regular diet and thin liquids at baseline.    Patient presented with suspected pharyngeal dysphagia. Bolus acceptance was adequate without evidence of anterior bolus loss. Mastication and AP bolus transit were thorough and efficient without evidence of oral residue. Suspect premature bolus loss vs delayed pharyngeal swallow initiation. Immediate and/or delayed cough noted following thin liquid trials x3. Symptoms were reduced with use of mildly thick liquids.    Recommend patient initiate a regular diet and mildly thick liquids. Bolus size and rate of intake should be limited.  reported that patient takes medications crushed in a pureed bolus at baseline. Patient would benefit from further evaluation of oropharyngeal swallow via VFSS. Education provided and patient agreeable to plan. Further recommendations pending results of VFSS.         RECOMMENDATIONS   Diet Recommendations - Solids: Regular  Diet Recommendations - Liquids: Nectar thick liquids/ Mildly thick                        Compensatory Strategies Recommended: Small bites and sips  Aspiration Precautions: Upright position  Medication Administration Recommendations: Crushed in puree  Treatment Plan/Recommendations: Videofluoroscopic swallow study    HISTORY   MEDICAL HISTORY  Reason for Referral: R/O aspiration    Problem List  Principal Problem:    Acute congestive heart failure, unspecified heart failure type (HCC)  Active Problems:    Vaginal bleeding    Iron deficiency anemia      Past Medical  History  Past Medical History:    Arrhythmia    Afib    Coronary atherosclerosis    Hearing impairment    jeffrey. HA    High cholesterol    Hx of CABG    Incontinence    Pacemaker       Prior Living Situation: Home with support  Diet Prior to Admission: Regular;Thin liquids  Precautions: Aspiration    Patient/Family Goals: none stated    SWALLOWING HISTORY  Current Diet Consistency: Regular;Thin liquids  Dysphagia History: as above  Imaging Results:   CXR 6/23/24  CONCLUSION:       Findings are suggestive of fluid overload/CHF.         LOCATION:  [facility]                  Dictated by (CST): Emile Bello MD on 6/23/2024 at 2:13 PM       Finalized by (CST): Emile Bello MD on 6/23/2024 at 2:14 PM     SUBJECTIVE       OBJECTIVE   ORAL MOTOR EXAMINATION  Dentition: Functional  Symmetry: Within Functional Limits  Strength: Within Functional Limits     Range of Motion: Within Functional Limits       Voice Quality: Clear  Respiratory Status:  (dyspnea)  Consistencies Trialed: Thin liquids;Nectar thick liquids;Puree;Hard solid  Method of Presentation: Staff/Clinician assistance  Patient Positioning: Upright;Midline    Oral Phase of Swallow: Within Functional Limits                      Pharyngeal Phase of Swallow: Impaired        Laryngeal Elevation Coordination: Appears impaired  (Please note: Silent aspiration cannot be evaluated clinically. Videofluoroscopic Swallow Study is required to rule-out silent aspiration.)    Esophageal Phase of Swallow: No complaints consistent with possible esophageal involvement  Comments: d/w RN              GOALS  Goal #1 VFSS  In Progress   Goal #2     Goal #3     Goal #4     Goal #5     Goal #6     Goal #7     Goal #8       FOLLOW UP  Treatment Plan/Recommendations: Videofluoroscopic swallow study     Follow Up Needed (Documentation Required): Yes  SLP Follow-up Date: 06/25/24    Thank you for your referral.   If you have any questions, please contact JILLIAN Jane

## 2024-06-24 NOTE — CM/SW NOTE
06/24/24 1400   CM/SW Referral Data   Referral Source Social Work (self-referral)   Reason for Referral Discharge planning   Informant Patient;Spouse/Significant Other   Medical Hx   Does patient have an established PCP? Yes   Patient Info   Patient's Current Mental Status at Time of Assessment Alert;Oriented   Patient's Home Environment Independent Living   Patient lives with Spouse/Significant other;Other  (caregiver)   Patient Status Prior to Admission   Independent with ADLs and Mobility No   Pt. requires assistance with Housework;Driving;Bathing;Ambulating;Toileting   Services in place prior to admission long-term Home Care;DME/Supplies at home   Type of DME/Supplies Wheelchair;Hospital Bed;Jeannine Lift   long-term Home Care Provider Private Duty   long-term Care hours per day 12   long-term Care days per week 7   Discharge Needs   Anticipated D/C needs Transportation services       SW met with pt and spouse/POA at bedside to discuss discharge planning. Pt is a 89 y/o female who was admitted for CHF. Pt presented as alert and oriented, but gathered most of the answers from spouse. Pt and spouse reside at [facility]. Pt has a 12 hour caregiver from -7p seven days a week. Pt has a hospital bed, jeannine lift, wheelchair at home. Pt has a visiting physician 1x per week (Dr Pepe Oconnell). Pt gets medications delivered from [place] at the [facility]. Spouse also provides assistance when needed and when caregivers are not there. Pt and spouse have been  for 68 years. Confirmed plan with pt and spouse that the plan is to return back to [facility] at discharge. Confirmed w/ spouse that SW will arrange for S transportation. Spouse hopeful to hear early on for discharge so that he can confirm caregiver arrangements prior to discharge. SW updated RN of this. All questions addressed.     No discharge report is needed to [facility] since pt resides in the [facility] side.      &  to remain  available and supportive for discharge planning needs.    Michelle Manriquez MSW, LSW  Discharge Planner  [phone]

## 2024-06-24 NOTE — CONSULTS
Cardiology Consultation    Rosalia Peterson Patient Status:  Observation    2/3/1932 MRN [id number]   [facility] 8NE-A Attending Bobby Blanton MD   Hosp Day # 0 PCP Pepe Oconnell DO     Reason for Consultation:  CHF      History of Present Illness:  Rosalia Peterson is a a(n) 89 year old female. Here with her , he provides the history as she is alert only to self.  Followed by Dr. Weiner.  Chronic Afib with AVN ablation, PPM, MV repair, bio AVR.  Last echo Aug, 2023.  Brought in because she appeared dyspneic.  No edema or other cardiac concerns.  Also with vaginal bleeding, an issue for a few months.  She is on Xarelto.  In the ER, CXR c/w CHF.  Started on IV lasix and admitted.  BP's higher over night.  On RA.    History:  Past Medical History:    Arrhythmia    Afib    Coronary atherosclerosis    Hearing impairment    jeffrey. HA    High cholesterol    Hx of CABG    Incontinence    Pacemaker     Past Surgical History:   Procedure Laterality Date    Cabg      Cardiac pacemaker placement      Hc insert or replace perm pacemaker ventricle  10/01/2021    Ndsc ablation & rcnstj atria lmtd w/o bypass       History reviewed. No pertinent family history.      Allergies:  Allergies   Allergen Reactions    Dabigatran OTHER (SEE COMMENTS)     Oral, epistaxis       Medications:   potassium chloride  40 mEq Oral Q4H    furosemide  40 mg Intravenous BID (Diuretic)    aspirin  81 mg Oral Daily    atorvastatin  20 mg Oral Daily    [Held by provider] norethindrone  5 mg Oral Nightly    rivaroxaban  15 mg Oral Daily with food    predniSONE  5 mg Oral Daily       Continuous Infusions:      Social History:   reports that she has never smoked. She has never used smokeless tobacco. She reports that she does not currently use alcohol. She reports that she does not use drugs.    Review of Systems:  All systems were reviewed and are negative except as described above in HPI.    Physical Exam:      Temp:  [97.6 °F (36.4  °C)-98.5 °F (36.9 °C)] 97.6 °F (36.4 °C)  Pulse:  [] 70  Resp:  [22-27] 22  BP: (130-172)/() 160/75  SpO2:  [90 %-100 %] 90 %    Last 3 Weights   06/24/24 0545 160 lb 6.4 oz (72.8 kg)   06/23/24 1230 169 lb 12.1 oz (77 kg)   05/06/24 0844 170 lb (77.1 kg)   08/18/23 0035 158 lb (71.7 kg)   08/17/23 2008 158 lb (71.7 kg)           General: No apparent distress  HEENT: No focal deficits.  Neck: supple. JVP normal  Cardiac: Regular rhythm, S1, S2 normal,   No rub or gallop.  No murmur.  Lungs: CTA  Abdomen: Soft, non-tender.   Extremities: No edema  Neurologic: no focal deficits  Skin: Warm and dry.          Telemetry: paced    Laboratories and Data:  Diagnostics:    EKG, 6/24/2024:  V paced    CXR, 6/24/2024:  reviewed    Labs:   HEM:  Recent Labs   Lab 06/23/24  1243 06/24/24  0511   WBC 7.8 7.8   HGB 9.4* 8.6*   .0 250.0       Chem:  Recent Labs   Lab 06/23/24  1249 06/24/24  0511    147*   K 4.5 3.5    113*   CO2 26.0 28.0   BUN 21 21   CREATSERUM 1.17* 1.07*   CA 8.8 8.5   MG  --  2.5   * 83       Recent Labs   Lab 06/23/24  1249 06/24/24  0511   ALT 92* 124*   AST 70* 90*   ALB 2.8* 2.6*       No results for input(s): \"PTP\", \"INR\" in the last 168 hours.    No results for input(s): \"TROP\", \"CK\" in the last 168 hours.      Impression:   Acute diastolic CHF, EF historically preserved.  H/o MV repair, bio AVR.  Chronic afib, AVN ablation, PPM.  On xarelto.  Advanced dementia  Vaginal bleeding with acute on chronic anemia.  PMR, on steroids      Plan:  Continue IV lasix, daily BMP.  Echo pending.  Hold xarelto until vaginal bleeding sorted out.    Justice Alvarez MD  6/24/2024  7:17 AM  C5

## 2024-06-24 NOTE — PLAN OF CARE
Patient is admitted for SOB. Pt is AOx1-2.   Lethargic and sleepy. Per  it is her baseline. Pt wakes up with talking. Afebrile and denies any pain. BP elevated. Cardiology ordered Amlodipine and Hydralazine.  SpO2 maintained on RA. . Dyspneic and labored breathing  when pt is awake. No cough. Tele-Vpaced. Xarelto is on hold. Reg diet with nectar thick liquid. Speech ordered video swallowing. Denies any n/v/d. Incontinent x2.  Total care with Q2 turn. IV Lasix. DW. Strict I/O.  Will continue to monitor.  Pt is updated with plan of care.        Problem: PAIN - ADULT  Goal: Verbalizes/displays adequate comfort level or patient's stated pain goal  Description: INTERVENTIONS:  - Encourage pt to monitor pain and request assistance  - Assess pain using appropriate pain scale  - Administer analgesics based on type and severity of pain and evaluate response  - Implement non-pharmacological measures as appropriate and evaluate response  - Consider cultural and social influences on pain and pain management  - Manage/alleviate anxiety  - Utilize distraction and/or relaxation techniques  - Monitor for opioid side effects  - Notify MD/LIP if interventions unsuccessful or patient reports new pain  - Anticipate increased pain with activity and pre-medicate as appropriate  Outcome: Progressing     Problem: Patient/Family Goals  Goal: Patient/Family Long Term Goal  Description: Patient's Long Term Goal: Stay out of hospital    Interventions:  - medication compliance  -follow up apts   - See additional Care Plan goals for specific interventions  Outcome: Progressing  Goal: Patient/Family Short Term Goal  Description: Patient's Short Term Goal: go home     Interventions:   - cardiology to see   - See additional Care Plan goals for specific interventions  Outcome: Progressing     Problem: SAFETY ADULT - FALL  Goal: Free from fall injury  Description: INTERVENTIONS:  - Assess pt frequently for physical needs  - Identify  cognitive and physical deficits and behaviors that affect risk of falls.  - [place] fall precautions as indicated by assessment.  - Educate pt/family on patient safety including physical limitations  - Instruct pt to call for assistance with activity based on assessment  - Modify environment to reduce risk of injury  - Provide assistive devices as appropriate  - Consider OT/PT consult to assist with strengthening/mobility  - Encourage toileting schedule  Outcome: Progressing     Problem: DISCHARGE PLANNING  Goal: Discharge to home or other facility with appropriate resources  Description: INTERVENTIONS:  - Identify barriers to discharge w/pt and caregiver  - Include patient/family/discharge partner in discharge planning  - Arrange for needed discharge resources and transportation as appropriate  - Identify discharge learning needs (meds, wound care, etc)  - Arrange for interpreters to assist at discharge as needed  - Consider post-discharge preferences of patient/family/discharge partner  - Complete POLST form as appropriate  - Assess patient's ability to be responsible for managing their own health  - Refer to Case Management Department for coordinating discharge planning if the patient needs post-hospital services based on physician/LIP order or complex needs related to functional status, cognitive ability or social support system  Outcome: Progressing

## 2024-06-24 NOTE — DISCHARGE INSTRUCTIONS
Have lab draw -- CBC, CMP -- next Wed       Diet Recommendations - Solids: Regular  Diet Recommendations - Liquids: Nectar thick liquids/ Mildly thick  Compensatory Strategies Recommended: Liquids via spoon;Small bites and sips;Multiple swallows;No straws (Prolonged bolus hold)  Aspiration Precautions: Upright position  Medication Administration Recommendations: Crushed in puree        Going Home    In this section you will find the tools which will guide you through the first few days after you leave the hospital. Continued use of these tools will help you develop the skills necessary to keep your heart failure under control.     Heart Failure Guidelines - place this worksheet on your refrigerator or somewhere you can refer to it daily to help you decide if your symptoms are under control, and what to do if they are not.     Home Care Instructions Following Heart Failure - the most important things to do every day include:     Weigh yourself  Take your medicines as prescribed  Limit your sodium (salt) and fluid intake  Know when to call your cardiologist, primary doctor, or nurse  Know when to seek emergency care    There is also a handy weight chart on which to record your weight every day, information on measuring fluids and limiting fluid intake, and a section for recording your thoughts or questions.     Things for You to Remember:   1. An appointment has been made for you to see your doctor or healthcare provider within 7 days of hospital discharge. It is important that you attend this appointment to make sure your symptoms are under control.     2. Your recommended sodium intake is 7198-3357 mg daily    3. Limit your fluid intake to no more than 2 liters or 64 ounces per day    4. Some exercise and activity is important to help keep your heart functioning and strong. Unless instructed not to exercise, you may walk at a slow to moderate pace for 10-15 minutes 2-3 days per week to start. Pace your activity to  prevent shortness of breath or fatigue. Stop exercise if you develop chest pain, lightheadedness, or significant shortness of breath.       Call Your Cardiologist If:   You gain 2 pounds overnight or 3-4 pounds in 3-5 days  You have more difficulty breathing  You are getting more tired with normal activity  You are more short of breath lying down, or awaken at night short of breath  You have swelling of your feet or legs  You urinate less often during the day and more often at night  You have cramps in your legs  You have blurred vision or see yellowish-green halos around objects of lights    Go to the Emergency Room If:   You have pain or tightness in your chest  You are extremely short of breath  You are coughing up pink-frothy mucus  You are traveling and develop symptoms of worsening heart failure

## 2024-06-24 NOTE — PROGRESS NOTES
[facility]   part of [facility]     Hospitalist Progress Note     Rosalia Peterson Patient Status:  Observation    2/3/1932 MRN [id number]   Location [facility] 8NE-A Attending Bobby Blanton MD   Hosp Day # 0 PCP Pepe Oconnell DO     Chief Complaint:   Chief Complaint   Patient presents with    Cough/URI       Subjective:     Patient denies any complaints but is confused as well    Objective:    Review of Systems:   4 point ROS completed and was negative, except for pertinent positive and negatives stated in subjective.    Vital signs:  Temp:  [97.6 °F (36.4 °C)-98.5 °F (36.9 °C)] 98 °F (36.7 °C)  Pulse:  [] 70  Resp:  [20-27] 20  BP: (130-172)/() 156/54  SpO2:  [90 %-100 %] 95 %    Physical Exam:    General: No acute distress.   Respiratory: Clear to auscultation bilaterally. No wheezes. No rhonchi.  Cardiovascular: S1, S2. Regular rate and rhythm. No murmurs.  Abdomen: Soft, nontender, nondistended.    Extremities: No edema.    Diagnostic Data:    Labs:  Recent Labs   Lab 24  1243 24  0511   WBC 7.8 7.8   HGB 9.4* 8.6*   MCV 76.3* 76.3*   .0 250.0       Recent Labs   Lab 24  1249 24  0511   * 83   BUN 21 21   CREATSERUM 1.17* 1.07*   CA 8.8 8.5   ALB 2.8* 2.6*    147*   K 4.5 3.5    113*   CO2 26.0 28.0   ALKPHO 57 49*   AST 70* 90*   ALT 92* 124*   BILT 0.7 0.6   TP 7.1 6.5       Estimated Creatinine Clearance: 27.8 mL/min (A) (based on SCr of 1.07 mg/dL (H)).    No results for input(s): \"PTP\", \"INR\" in the last 168 hours.         COVID-19 Lab Results    COVID-19  Lab Results   Component Value Date    COVID19 Not Detected 2024    COVID19 Not Detected 2023    COVID19 Not Detected 2023       Pro-Calcitonin  No results for input(s): \"PCT\" in the last 168 hours.    Cardiac  Recent Labs   Lab 24  1249   PBNP 5,881*       Creatinine Kinase  No results for input(s): \"CK\" in the last 168 hours.    Inflammatory  Markers  Recent Labs   Lab 06/23/24  1249   HAO 53.0       No results for input(s): \"TROP\", \"TROPHS\", \"CK\" in the last 168 hours.    Imaging: Imaging data reviewed in Epic.    Medications:    potassium chloride  40 mEq Oral Q4H    furosemide  40 mg Intravenous BID (Diuretic)    aspirin  81 mg Oral Daily    atorvastatin  20 mg Oral Daily    predniSONE  5 mg Oral Daily       Assessment & Plan:      #acute HFpEF exacerbation; repeat echo; cards following; IV lasix; monitor lytes  #vaginal bleeding/spotting; doac held per cards.  Only takes norethindrone in prn fashion; will d/w ob/gyn  #elevated lfts; likely from congested hepatopathy; monitor  #anemia-monitor; iron stores somewhat low; started oral iron; some component from blood loss from vaginal bleeding  # CAD with h/o CABG  # PPM  # AAA  # s/p bioprosthetic AVR-aspirin for now  #chronic afib-doac held per cards  #Pulm HTN  # PMR on chronic steroids   # CKD stage III - near baseline  #advanced dementia    Holding oral lasix while on iv lasix    Plan of care discussed with patient and RN    Bobby Bailon MD    Supplementary Documentation:     Quality:  DVT Prophylaxis: scds only for now, given recent vaginal bleeding  CODE status: see chart  Tsang: no  Central line: no      Estimated date of discharge: tbd  At this point Ms. Peterson is expected to be discharge to: tbd

## 2024-06-24 NOTE — PROGRESS NOTES
Rosalia Peterson Patient Status:  Observation    2/3/1932 MRN [id number]   Location [facility] 8NE-A Attending Bobby Blanton MD   Hosp Day # 0 PCP Pepe Oconnell DO     Cardiology Nocturnal APN Note    Briefly: (Documentation from chart review)     Rosalia Peterson is a 89 y/o female with PMH/PSH of AF, ppm in situ, HLD, AVR, MVR, HTN, CKD who presented to the ED with increased GUZMAN.    Primary Cardiologist [facility]    Vital Signs:       2024     4:15 PM 2024     5:19 PM   Vitals History   /116 160/84   BP Location  Left arm   Pulse 103 70   Resp 26 22   Temp  98.3 °F (36.8 °C)   SpO2 94 % 94 %        Labs:   Lab Results   Component Value Date    WBC 7.8 2024    HGB 9.4 2024    HCT 30.6 2024    .0 2024    CREATSERUM 1.17 2024    BUN 21 2024     2024    K 4.5 2024     2024    CO2 26.0 2024     2024    CA 8.8 2024    ALB 2.8 2024    ALKPHO 57 2024    BILT 0.7 2024    TP 7.1 2024    AST 70 2024    ALT 92 2024       Diagnostics:   XR CHEST AP PORTABLE  (CPT=71045)    Result Date: 2024  CONCLUSION:   Findings are suggestive of fluid overload/CHF.   LOCATION:  [place]      Dictated by (CST): Emile Bello MD on 2024 at 2:13 PM     Finalized by (CST): Emile Bello MD on 2024 at 2:14 PM        Allergies:  Allergies   Allergen Reactions    Dabigatran OTHER (SEE COMMENTS)     Oral, epistaxis       Medications:    furosemide    acetaminophen    melatonin    polyethylene glycol (PEG 3350)    sennosides    bisacodyl    ondansetron    benzonatate    glycerin-hypromellose-    sodium chloride    [START ON 2024] aspirin    [START ON 2024] atorvastatin    [Held by provider] norethindrone    [START ON 2024] rivaroxaban    [START ON 2024] predniSONE    Assessment:   SOB  - BNP 5881  - Chest x-ray with fluid overload  -  Received 40mg IVP Lasix in ED and scheduled for 40mg BID IVP  - Strict I/O      HTN  - Elevated in ED      Plan:    - Continue to monitor overnight  - Formal Cardiology consult to follow in AM.       CHRISTOPHE Benitez  [facility]  6/23/2024  7:16 PM

## 2024-06-24 NOTE — PLAN OF CARE
A&O to self only. Pt denies any pain. SPO2 above 94% on RA, breath sounds are clear b/l. V-Paced on tele. Incontinent of bowel and bladder, purewick in place last bm 6/23/24.     Bed is locked and in low position. Call light and personal items within reach.  Pt updated with plan of care.    Approx 2348 /55. Per CHRISTOPHE Powers 1x dose of 5mg of Lopressor.     Problem: PAIN - ADULT  Goal: Verbalizes/displays adequate comfort level or patient's stated pain goal  Description: INTERVENTIONS:  - Encourage pt to monitor pain and request assistance  - Assess pain using appropriate pain scale  - Administer analgesics based on type and severity of pain and evaluate response  - Implement non-pharmacological measures as appropriate and evaluate response  - Consider cultural and social influences on pain and pain management  - Manage/alleviate anxiety  - Utilize distraction and/or relaxation techniques  - Monitor for opioid side effects  - Notify MD/LIP if interventions unsuccessful or patient reports new pain  - Anticipate increased pain with activity and pre-medicate as appropriate  Outcome: Progressing     Problem: Patient/Family Goals  Goal: Patient/Family Long Term Goal  Description: Patient's Long Term Goal: Stay out of hospital    Interventions:  - medication compliance  -follow up apts   - See additional Care Plan goals for specific interventions  Outcome: Progressing  Goal: Patient/Family Short Term Goal  Description: Patient's Short Term Goal: go home     Interventions:   - cardiology to see   - See additional Care Plan goals for specific interventions  Outcome: Progressing     Problem: SAFETY ADULT - FALL  Goal: Free from fall injury  Description: INTERVENTIONS:  - Assess pt frequently for physical needs  - Identify cognitive and physical deficits and behaviors that affect risk of falls.  - [place] fall precautions as indicated by assessment.  - Educate pt/family on patient safety including physical  limitations  - Instruct pt to call for assistance with activity based on assessment  - Modify environment to reduce risk of injury  - Provide assistive devices as appropriate  - Consider OT/PT consult to assist with strengthening/mobility  - Encourage toileting schedule  Outcome: Progressing     Problem: DISCHARGE PLANNING  Goal: Discharge to home or other facility with appropriate resources  Description: INTERVENTIONS:  - Identify barriers to discharge w/pt and caregiver  - Include patient/family/discharge partner in discharge planning  - Arrange for needed discharge resources and transportation as appropriate  - Identify discharge learning needs (meds, wound care, etc)  - Arrange for interpreters to assist at discharge as needed  - Consider post-discharge preferences of patient/family/discharge partner  - Complete POLST form as appropriate  - Assess patient's ability to be responsible for managing their own health  - Refer to Case Management Department for coordinating discharge planning if the patient needs post-hospital services based on physician/LIP order or complex needs related to functional status, cognitive ability or social support system  Outcome: Progressing

## 2024-06-25 ENCOUNTER — APPOINTMENT (OUTPATIENT)
Dept: GENERAL RADIOLOGY | Facility: HOSPITAL | Age: 89
End: 2024-06-25
Attending: HOSPITALIST
Payer: MEDICARE

## 2024-06-25 LAB
ANION GAP SERPL CALC-SCNC: 3 MMOL/L (ref 0–18)
BUN BLD-MCNC: 21 MG/DL (ref 9–23)
CALCIUM BLD-MCNC: 8.9 MG/DL (ref 8.5–10.1)
CHLORIDE SERPL-SCNC: 112 MMOL/L (ref 98–112)
CO2 SERPL-SCNC: 30 MMOL/L (ref 21–32)
CREAT BLD-MCNC: 1.14 MG/DL
EGFRCR SERPLBLD CKD-EPI 2021: 45 ML/MIN/1.73M2 (ref 60–?)
GLUCOSE BLD-MCNC: 95 MG/DL (ref 70–99)
OSMOLALITY SERPL CALC.SUM OF ELEC: 303 MOSM/KG (ref 275–295)
POTASSIUM SERPL-SCNC: 4.2 MMOL/L (ref 3.5–5.1)
SODIUM SERPL-SCNC: 145 MMOL/L (ref 136–145)

## 2024-06-25 PROCEDURE — 74230 X-RAY XM SWLNG FUNCJ C+: CPT | Performed by: HOSPITALIST

## 2024-06-25 PROCEDURE — 99232 SBSQ HOSP IP/OBS MODERATE 35: CPT | Performed by: HOSPITALIST

## 2024-06-25 RX ORDER — AMLODIPINE BESYLATE 5 MG/1
10 TABLET ORAL DAILY
Status: DISCONTINUED | OUTPATIENT
Start: 2024-06-25 | End: 2024-06-27

## 2024-06-25 NOTE — PROGRESS NOTES
[facility]   part of [facility]     Hospitalist Progress Note     Rosalia Peterson Patient Status:  Inpatient    2/3/1932 MRN [id number]   Location [facility] 8NE-A Attending Bobby Blanton MD   Hosp Day # 2 PCP Pepe Oconnell DO     Chief Complaint: HF    Subjective:   Patient asleep at time of visit. On room air.  at bedside.    Current medications:   potassium chloride  40 mEq Oral Q4H    losartan  25 mg Oral Daily    torsemide  20 mg Oral BID (Diuretic)    amLODIPine  10 mg Oral Daily    ferrous sulfate  325 mg Oral Daily with breakfast    aspirin  81 mg Oral Daily    atorvastatin  20 mg Oral Daily    predniSONE  5 mg Oral Daily       Objective:    Review of Systems:   Limited d/t pateint factors.     Vital signs:  Temp:  [97.7 °F (36.5 °C)-98.4 °F (36.9 °C)] 98.2 °F (36.8 °C)  Pulse:  [70-73] 70  Resp:  [22-24] 22  BP: (141-168)/(47-97) 141/63  SpO2:  [92 %-99 %] 98 %  Patient Weight for the past 72 hrs:   Weight   24 1230 169 lb 12.1 oz (77 kg)   24 0545 160 lb 6.4 oz (72.8 kg)   24 0354 159 lb 13.3 oz (72.5 kg)     Physical Exam:    General: No acute distress.   Respiratory: Diminished, poor effort  Cardiovascular: S1, S2. Regular rate and rhythm.  Abdomen: Soft, nontender, nondistended.  Positive bowel sounds.  Extremities: No edema.    Diagnostic Data:    Labs:  Recent Labs   Lab 24  1243 24  0511 24  0420   WBC 7.8 7.8 8.3   HGB 9.4* 8.6* 8.4*   MCV 76.3* 76.3* 73.4*   .0 250.0 256.0       Recent Labs   Lab 24  1249 24  0511 24  1359 24  0608 24  0420   * 83  --  95 88  88   BUN 21 21  --  21 27*  27*   CREATSERUM 1.17* 1.07*  --  1.14* 1.18*  1.18*   CA 8.8 8.5  --  8.9 8.8  8.8   ALB 2.8* 2.6*  --   --  2.6*    147*  --  145 146*  146*   K 4.5 3.5 5.0 4.2 3.4*  3.4*    113*  --  112 108  108   CO2 26.0 28.0  --  30.0 32.0  32.0   ALKPHO 57 49*  --   --  51*   AST 70* 90*  --   --   79*   ALT 92* 124*  --   --  128*   BILT 0.7 0.6  --   --  0.6   TP 7.1 6.5  --   --  6.3*       Estimated Creatinine Clearance: 25.2 mL/min (A) (based on SCr of 1.18 mg/dL (H)).    No results for input(s): \"PTP\", \"INR\" in the last 168 hours.         COVID-19 Lab Results    COVID-19  Lab Results   Component Value Date    COVID19 Not Detected 06/23/2024    COVID19 Not Detected 08/18/2023    COVID19 Not Detected 08/17/2023       Pro-Calcitonin  No results for input(s): \"PCT\" in the last 168 hours.    Cardiac  Recent Labs   Lab 06/23/24  1249   PBNP 5,881*       Creatinine Kinase  No results for input(s): \"CK\" in the last 168 hours.    Inflammatory Markers  Recent Labs   Lab 06/23/24  1249   HAO 53.0       No results for input(s): \"TROP\", \"TROPHS\", \"CK\" in the last 168 hours.    Imaging: Imaging data reviewed in Epic.    Medications:    potassium chloride  40 mEq Oral Q4H    losartan  25 mg Oral Daily    torsemide  20 mg Oral BID (Diuretic)    amLODIPine  10 mg Oral Daily    ferrous sulfate  325 mg Oral Daily with breakfast    aspirin  81 mg Oral Daily    atorvastatin  20 mg Oral Daily    predniSONE  5 mg Oral Daily       Assessment & Plan:    Acute on chronic heart failure with preserved EF, ECHO EF 50-55%  Pulmonary hypertension  Lasix 40 IV BID > PO  Daily weight  Cardiology consult   Atrial fibrillation on DOAC  SSS sp PPM  CAD sp CABG sp bioprosthetic AVR, MV repair  AAA  Essential hypertension  Dyslipidemia  Aspirin  Norvasc, Hydralazine  Lipitor  DOAC on hold d/t vaginal bleeding  Monitor hemodynamics  Telemetry   Anemia, mixed iron deficiency and acute blood loss  Iron replacement   Monitor H&H   Vaginal bleeding, per chart work-up declined in the past, Norethindrone PRN  Monitor   Transaminitis, passive congestion   Monitor LFTs   PMR on chronic steroids  Prednisone   Dementia     Supplementary Documentation:   Quality:  DVT Prophylaxis: SCDs    At this point Ms. Peterson is expected to be discharge to:  Home    Plan of care discussed with  and NP.    Danny Nelson MD        **Certification      PHYSICIAN Certification of Need for Inpatient Hospitalization - Initial Certification    Patient will require inpatient services that will reasonably be expected to span two midnight's based on the clinical documentation in H+P.   Based on patients current state of illness, I anticipate that, after discharge, patient will require TBD.

## 2024-06-25 NOTE — PLAN OF CARE
Assumed patient care, patient is alert and oriented x1. On room air.V-paced on tele, no complains of chest pain. Bed in lower position, call light within reach.  at bedside, questions are answered, plan of care updated. POC: IV lasix    Problem: PAIN - ADULT  Goal: Verbalizes/displays adequate comfort level or patient's stated pain goal  Description: INTERVENTIONS:  - Encourage pt to monitor pain and request assistance  - Assess pain using appropriate pain scale  - Administer analgesics based on type and severity of pain and evaluate response  - Implement non-pharmacological measures as appropriate and evaluate response  - Consider cultural and social influences on pain and pain management  - Manage/alleviate anxiety  - Utilize distraction and/or relaxation techniques  - Monitor for opioid side effects  - Notify MD/LIP if interventions unsuccessful or patient reports new pain  - Anticipate increased pain with activity and pre-medicate as appropriate  Outcome: Progressing

## 2024-06-25 NOTE — PROGRESS NOTES
[facility]   part of formerly [facility]     Hospitalist Progress Note     Rosalia Peterson Patient Status:  Observation    2/3/1932 MRN [id number]   Location [facility] 8NE-A Attending Bobby Blanton MD   Hosp Day # 1 PCP Pepe Oconnell DO     Chief Complaint:   Chief Complaint   Patient presents with    Cough/URI       Subjective:     Somnolent but denies complaints    Objective:    Review of Systems:   3 point ROS completed and was negative, except for pertinent positive and negatives stated in subjective.    Vital signs:  Temp:  [97.6 °F (36.4 °C)-98.6 °F (37 °C)] 97.7 °F (36.5 °C)  Pulse:  [70-76] 71  Resp:  [20-24] 20  BP: (116-191)/() 166/52  SpO2:  [91 %-98 %] 92 %    Physical Exam:    General: somnolent but rousable  Respiratory: Clear to auscultation bilaterally. No wheezes. No rhonchi.  Cardiovascular: S1, S2. Regular rate and rhythm. No murmurs.  Abdomen: Soft, nontender, nondistended.    Extremities: No edema.    Diagnostic Data:    Labs:  Recent Labs   Lab 24  1243 24  0511   WBC 7.8 7.8   HGB 9.4* 8.6*   MCV 76.3* 76.3*   .0 250.0       Recent Labs   Lab 24  1249 24  0511 24  1359 24  0608   * 83  --  95   BUN 21 21  --  21   CREATSERUM 1.17* 1.07*  --  1.14*   CA 8.8 8.5  --  8.9   ALB 2.8* 2.6*  --   --     147*  --  145   K 4.5 3.5 5.0 4.2    113*  --  112   CO2 26.0 28.0  --  30.0   ALKPHO 57 49*  --   --    AST 70* 90*  --   --    ALT 92* 124*  --   --    BILT 0.7 0.6  --   --    TP 7.1 6.5  --   --        Estimated Creatinine Clearance: 26 mL/min (A) (based on SCr of 1.14 mg/dL (H)).    No results for input(s): \"PTP\", \"INR\" in the last 168 hours.         COVID-19 Lab Results    COVID-19  Lab Results   Component Value Date    COVID19 Not Detected 2024    COVID19 Not Detected 2023    COVID19 Not Detected 2023       Pro-Calcitonin  No results for input(s): \"PCT\" in the last 168 hours.    Cardiac  Recent  Labs   Lab 06/23/24  1249   PBNP 5,881*       Creatinine Kinase  No results for input(s): \"CK\" in the last 168 hours.    Inflammatory Markers  Recent Labs   Lab 06/23/24  1249   HAO 53.0       No results for input(s): \"TROP\", \"TROPHS\", \"CK\" in the last 168 hours.    Imaging: Imaging data reviewed in Epic.    Medications:    ferrous sulfate  325 mg Oral Daily with breakfast    amLODIPine  5 mg Oral Daily    hydrALAZINE  25 mg Oral Q8H CHUYITA    furosemide  40 mg Intravenous BID (Diuretic)    aspirin  81 mg Oral Daily    atorvastatin  20 mg Oral Daily    predniSONE  5 mg Oral Daily       Assessment & Plan:      #acute HFpEF exacerbation; repeat echo; cards following; IV lasix; monitor lytes  #elevated lfts; likely from congested hepatopathy; monitor  #anemia-monitor; iron stores somewhat low; started oral iron; some component from blood loss from vaginal bleeding;  spoke with ob/gyn; family previously declined workup for this. Was started on prn norethindrone.  they said ok to hold off norethindrone (though prn would be fine); defer DOAC to cards, which is currently on hold  # CAD with h/o CABG  # PPM  # AAA  # s/p bioprosthetic AVR-aspirin for now  #chronic afib-doac held per cards  #Pulm HTN  # PMR on chronic steroids   # CKD stage III - near baseline  #advanced dementia    Holding oral lasix while on iv lasix    Plan of care discussed with patient and RN    Bobby Bailon MD    Supplementary Documentation:     Quality:  DVT Prophylaxis: scds only for now, given recent vaginal bleeding  CODE status: see chart  Tsang: no  Central line: no      Estimated date of discharge: tbd  At this point Ms. Peterson is expected to be discharge to: tbd

## 2024-06-25 NOTE — PLAN OF CARE
A&O to self only. Pt denies any pain. SPO2 above 94% on RA, breath sounds are clear b/l, with intermittent expiratory wheezes along upper lobes of lungs R>L.  V-Paced on tele. Incontinent of bowel and bladder, purewick in place last bm 6/24/24.      Bed is locked and in low position. Call light and personal items within reach.  Pt updated with plan of care.      Problem: PAIN - ADULT  Goal: Verbalizes/displays adequate comfort level or patient's stated pain goal  Description: INTERVENTIONS:  - Encourage pt to monitor pain and request assistance  - Assess pain using appropriate pain scale  - Administer analgesics based on type and severity of pain and evaluate response  - Implement non-pharmacological measures as appropriate and evaluate response  - Consider cultural and social influences on pain and pain management  - Manage/alleviate anxiety  - Utilize distraction and/or relaxation techniques  - Monitor for opioid side effects  - Notify MD/LIP if interventions unsuccessful or patient reports new pain  - Anticipate increased pain with activity and pre-medicate as appropriate  Outcome: Progressing     Problem: Patient/Family Goals  Goal: Patient/Family Long Term Goal  Description: Patient's Long Term Goal: Stay out of hospital    Interventions:  - medication compliance  -follow up apts   - See additional Care Plan goals for specific interventions  Outcome: Progressing  Goal: Patient/Family Short Term Goal  Description: Patient's Short Term Goal: go home     Interventions:   -video swallow test 6/25  - cardiology to see   - See additional Care Plan goals for specific interventions  Outcome: Progressing     Problem: SAFETY ADULT - FALL  Goal: Free from fall injury  Description: INTERVENTIONS:  - Assess pt frequently for physical needs  - Identify cognitive and physical deficits and behaviors that affect risk of falls.  - [place] fall precautions as indicated by assessment.  - Educate pt/family on patient safety  including physical limitations  - Instruct pt to call for assistance with activity based on assessment  - Modify environment to reduce risk of injury  - Provide assistive devices as appropriate  - Consider OT/PT consult to assist with strengthening/mobility  - Encourage toileting schedule  Outcome: Progressing     Problem: DISCHARGE PLANNING  Goal: Discharge to home or other facility with appropriate resources  Description: INTERVENTIONS:  - Identify barriers to discharge w/pt and caregiver  - Include patient/family/discharge partner in discharge planning  - Arrange for needed discharge resources and transportation as appropriate  - Identify discharge learning needs (meds, wound care, etc)  - Arrange for interpreters to assist at discharge as needed  - Consider post-discharge preferences of patient/family/discharge partner  - Complete POLST form as appropriate  - Assess patient's ability to be responsible for managing their own health  - Refer to Case Management Department for coordinating discharge planning if the patient needs post-hospital services based on physician/LIP order or complex needs related to functional status, cognitive ability or social support system  Outcome: Progressing

## 2024-06-25 NOTE — VIDEO SWALLOW STUDY NOTE
ADULT VIDEOFLUOROSCOPIC SWALLOWING STUDY    Admission Date: 6/23/2024  Evaluation Date: 06/25/24  Radiologist: Daksha MORTON   Diet Recommendations - Solids: Regular  Diet Recommendations - Liquids: Nectar thick liquids/ Mildly thick    Further Follow-up:  Follow Up Needed (Documentation Required): Yes  SLP Follow-up Date: 06/26/24  Compensatory Strategies Recommended: Liquids via spoon;Slow rate;No straws;Multiple swallows (Prolonged bolus hold)  Aspiration Precautions: Upright position  Medication Administration Recommendations: Crushed in puree  Treatment Plan/Recommendations: Aspiration precautions;Dysphagia therapy    HISTORY   Background/Objective Information: Patient is a 89 y/o female admitted with dyspnea and PMHx significant for CKD, HTN, and CAD. See adult swallowing evaluation note from yesterday for full background history.     Problem List  Principal Problem:    Acute congestive heart failure, unspecified heart failure type (HCC)  Active Problems:    Vaginal bleeding    Iron deficiency anemia      Past Medical History  Past Medical History:    Arrhythmia    Afib    Coronary atherosclerosis    Hearing impairment    jeffrey. HA    High cholesterol    Hx of CABG    Incontinence    Pacemaker       Current Diet Consistency: Regular;Thin liquids  Prior Level of Function: Dependent  Prior Living Situation: Home with support  History of Recent: Difficulty breathing  Precautions: Aspiration  Imaging results:   CXR 6/23/24  CONCLUSION:       Findings are suggestive of fluid overload/CHF.        LOCATION:  [facility]                 Dictated by (CST): Emile Bello MD on 6/23/2024 at 2:13 PM      Finalized by (CST): Emile Bello MD on 6/23/2024 at 2:14 PM      Reason for Referral: R/O aspiration      Family/Patient Goals:  None stated.     ASSESSMENT   DYSPHAGIA ASSESSMENT  Test completed in conjunction with Radiologist.  Patient Positioned: Upright;Midline.  Patient Viewed: Lateral.  Patient  Alertness: Fully alert.  Consistencies Presented: Thin liquids;Nectar thick liquids/ Mildly thick;Puree;Hard solid to assess oropharyngeal swallow function and assess for compensatory strategies to improve safe swallow function.    THIN LIQUIDS  Method of Presentation: Teaspoon;Cup  Oral Phase of Swallow (VFSS - Thin Liquids): Within Functional Limits  Triggered at: Pyriform sinuses  Residue Severity, Location: Throughout pharynx  Cleared/Reduced with: Multiple swallows  Laryngeal Penetration: Before the swallow  Tracheal Aspiration: Before the swallow;After the swallow  Cough/Throat Clear Response:  (no reflexive cough. Pt cued to cough, however ineffective)  Cough/Throat Clear Effective: No  Strategy(ies) Implemented (Thin Liquids):  (Prolonged bolus hold)  Effectiveness: No  NECTAR THICK LIQUIDS/ MILDLY THICK  Method of Presentation: Teaspoon;Cup  Triggered at: Pyriform sinuses  Residue Severity, Location: Throughout pharynx  Cleared/Reduced with: Multiple swallows  Laryngeal Penetration: Before the swallow (flash)  Tracheal Aspiration: After the swallow  Cough/Throat Clear Response: No  Strategy(ies) Implemented (Nectar Thick): Liquids via spoon (Prolonged bolus hold)  Effectiveness: Yes     PUREE  Triggered at: Valleculae  Laryngeal Penetration: None  Tracheal Aspiration: None     HARD SOLID  Oral Phase of Swallow (VFSS - Hard Solid): Within Functional Limits  Triggered at: Base of tongue  Laryngeal Penetration: None  Tracheal Aspiration: None  Penetration Aspiration Scale Score: Score 8: Material enters the airway, passes below the vocal folds, and no effort is made to eject       Overall Impression: Patient presented with mild oropharyngeal dysphagia. Bolus acceptance was adequate without evidence of anterior bolus loss. Mastication and AP transit were thorough and efficient. Base of tongue retraction, epiglottic inversion, and hyolaryngeal excursion were all adequate in strength and amplitude. Pharyngeal  swallow initiation was delayed, with initiation beginning at the level of the pyriform sinuses for both thin and mildly thick consistencies. Delayed pharyngeal swallow initiation resulted in silent aspiration before and after the swallow with thin liquids. Penetration occurred before the swallow with mildly thick liquids, resulting in silent aspiration after the swallow. Patient cued to cough, however was weak and ineffective. Cued prolonged bolus hold of mildly thick liquids via spoon was effective in improving timing of pharyngeal swallow initiation and preventing aspiration before the swallow. This was not effective with thin liquids or with mildly thick liquids via cup sip. Adequate timing of pharyngeal swallow initiation noted for puree  and hard solid consistencies with no penetration or aspiration observed. Scattered pharyngeal residue observed for thin and mildly thick consistencies, which was cleared with reflexive multiple swallows.       Recommend initiating a regular diet and mildly thick liquids. Liquids should be presented via spoon only. Recommend patient practice prolonged oral bolus holding with mildly thick liquids via spoon to improve timing of pharyngeal swallow initiation. Patient should continue to initiate multiple swallows per bolus to clear pharyngeal residue. Continue to eat at a slow rate and in an upright position. Avoid straws. SLP to follow up to reinforce aspiration precautions and compensatory strategies, monitor current diet tolerance, and potentially advance diet.    GOALS  Goal #1 The patient will tolerate regular consistency and mildly thick liquids without overt signs or symptoms of aspiration with 95 % accuracy over 1-2 session(s).  In Progress   Goal #2 The patient/family/caregiver will demonstrate understanding and implementation of aspiration precautions and swallow strategies independently over 1-2 session(s).    In Progress   Goal #3 The patient will tolerate trial upgrade  of regular consistency and thin liquids without overt signs or symptoms of aspiration with 95 % accuracy over 3-4 session(s).  In Progress   Goal #4 VFSS     Met                         EDUCATION/INSTRUCTION  Reviewed results and recommendations with patient/family/caregiver.  Agreement/Understanding verbalized and all questions answered to their apparent satisfaction.    INTERDISCIPLINARY COMMUNICATION  Reviewed results with Radiologist; agreement verbalized.    Patient Experiencing Pain: No                FOLLOW UP  Treatment Plan/Recommendations: Aspiration precautions;Dysphagia therapy           Thank you for your referral.   If you have any questions, please contact Merari MERCEDES

## 2024-06-25 NOTE — PROGRESS NOTES
Heart Failure Nurse  Progress Note    Patient was evaluated by the Heart Failure Nurse  for understanding, verbalization, demonstration, and recall of education related to heart failure, overall adherence to the behaviors necessary to maintain a compensated status, and risk for readmission.     Patient assessment:    Patient is able to verbalize signs/symptoms fluid overload/impending HF exacerbation and who to contact with problems                                          _X__ yes  ___ no      Patient is following a 2000 mg sodium diet                                             __X_ yes  ___ no    If no, barriers to 2000 mg sodium diet: Patient has two meals a day from the dining [facility]acinto at [facility], and her  usually feeds her a salad for lunch.    Patient informed of 2-Part dietician classes that is free if sign up within 30 days of discharge or $40  _X__ yes  ___ no      Patient is adherent to medication regimen                                              _X__ yes  ___ no    If no, barriers to medication regimen:    Patient has sufficient funds to purchase medication                      _X__ yes  ___ no      Patient has a scale in the home              ___ yes  _X__ no      Patient is adherent to daily weight monitoring                                        ___ yes   __X_ no    If no, barriers to daily weight monitoring: Patient is unable to stand to be weighed. Patient's  was given a tape measure to measure her belly daily for increasing abdominal girth. Not able to measure patient's belly, she was sleeping.     Symptom Tracker Worksheet reviewed with patient  __X_ yes   ___ no      Patient verbalizes understanding of stoplight/heart failure zones          _X__ yes   ___ no      Patient understands the importance of 7-day follow-up appointment      __X_ yes  ___ no    Appointment Date:          Patient has adequate transportation to attend follow-up appointments    _X__ yes  ___  no    If no, was referral to Social Work made  ___yes  ___ no      Family/Friend present during education: due to patient's dementia, education was given to patient's , Skip.     Additional consultations required: SERGIO Espitia RN (signature)

## 2024-06-25 NOTE — PROGRESS NOTES
Cardiology Progress Note        Rosalia Peterson Patient Status:  Inpatient    2/3/1932 MRN [id number]   [facility] 8NE-A Attending Bobby Blanton MD   Hosp Day # 1 PCP Pepe Oconnell DO     Subjective:  No new issues overnight.    Medications:   ferrous sulfate  325 mg Oral Daily with breakfast    amLODIPine  5 mg Oral Daily    hydrALAZINE  25 mg Oral Q8H CHUYITA    furosemide  40 mg Intravenous BID (Diuretic)    aspirin  81 mg Oral Daily    atorvastatin  20 mg Oral Daily    predniSONE  5 mg Oral Daily       Continuous Infusions:        Allergies:  Allergies   Allergen Reactions    Dabigatran OTHER (SEE COMMENTS)     Oral, epistaxis         Intake/Output:    Intake/Output Summary (Last 24 hours) at 2024 0842  Last data filed at 2024 0834  Gross per 24 hour   Intake 720 ml   Output 1100 ml   Net -380 ml           Last 3 Weights   24 0354 159 lb 13.3 oz (72.5 kg)   24 0545 160 lb 6.4 oz (72.8 kg)   24 1230 169 lb 12.1 oz (77 kg)   24 0844 170 lb (77.1 kg)   23 0035 158 lb (71.7 kg)   23 158 lb (71.7 kg)            Physical Exam:    Temp:  [97.6 °F (36.4 °C)-98.6 °F (37 °C)] 98.2 °F (36.8 °C)  Pulse:  [70-76] 70  Resp:  [20-24] 20  BP: (116-191)/() 122/67  SpO2:  [91 %-98 %] 93 %    General: No apparent distress  HEENT: No focal deficits.  Neck: supple. JVP normal  Cardiac: Regular rhythm, S1, S2 normal,  rub or gallop.  No murmur.  Lungs: CTA  Abdomen: Soft, non-tender.   Extremities: No edema  Neurologic: no focal deficits  Skin: Warm and dry.     Telemetry:     EKG:      Echo:      Cardiac Cath:      Labs:  HEM:  Recent Labs   Lab 24  1243 24  0511   WBC 7.8 7.8   HGB 9.4* 8.6*   .0 250.0       Chem:  Recent Labs   Lab 24  1249 24  0511 24  1359 24  0608    147*  --  145   K 4.5 3.5 5.0 4.2    113*  --  112   CO2 26.0 28.0  --  30.0   BUN 21 21  --  21   CREATSERUM 1.17* 1.07*  --   1.14*   CA 8.8 8.5  --  8.9   MG  --  2.5  --   --    * 83  --  95       Recent Labs   Lab 06/23/24  1249 06/24/24  0511   ALT 92* 124*   AST 70* 90*   ALB 2.8* 2.6*       No results for input(s): \"TROP\", \"CK\" in the last 168 hours.    No results for input(s): \"PTP\", \"INR\" in the last 168 hours.              Impression:  Acute diastolic CHF, EF 50-55% on 6/24.  H/o MV repair, bio AVR.  Look ok on echo 6/24.  Pulm HTN, PASP 65-70.  Chronic afib, AVN ablation, PPM.  On xarelto.  Advanced dementia  Vaginal bleeding with acute on chronic anemia.  PMR, on steroids  HTN - fair.          Plan:  Continue IV lasix.  Swallow eval ordered.  Increase amlodipine to 10 mg daily.  CBC pending.  Xarelto held pending stability of Hb.  Low threshold for stopping permanently in this situation.  Goals of care discussions appropriate.        Justice Alvarez MD  6/25/2024  8:42 AM  L3

## 2024-06-26 LAB
ALBUMIN SERPL-MCNC: 2.6 G/DL (ref 3.4–5)
ALBUMIN/GLOB SERPL: 0.7 {RATIO} (ref 1–2)
ALP LIVER SERPL-CCNC: 51 U/L
ALT SERPL-CCNC: 128 U/L
ANION GAP SERPL CALC-SCNC: 6 MMOL/L (ref 0–18)
ANION GAP SERPL CALC-SCNC: 6 MMOL/L (ref 0–18)
AST SERPL-CCNC: 79 U/L (ref 15–37)
BASOPHILS # BLD AUTO: 0.08 X10(3) UL (ref 0–0.2)
BASOPHILS NFR BLD AUTO: 1 %
BILIRUB SERPL-MCNC: 0.6 MG/DL (ref 0.1–2)
BUN BLD-MCNC: 27 MG/DL (ref 9–23)
BUN BLD-MCNC: 27 MG/DL (ref 9–23)
CALCIUM BLD-MCNC: 8.8 MG/DL (ref 8.5–10.1)
CALCIUM BLD-MCNC: 8.8 MG/DL (ref 8.5–10.1)
CHLORIDE SERPL-SCNC: 108 MMOL/L (ref 98–112)
CHLORIDE SERPL-SCNC: 108 MMOL/L (ref 98–112)
CO2 SERPL-SCNC: 32 MMOL/L (ref 21–32)
CO2 SERPL-SCNC: 32 MMOL/L (ref 21–32)
CREAT BLD-MCNC: 1.18 MG/DL
CREAT BLD-MCNC: 1.18 MG/DL
EGFRCR SERPLBLD CKD-EPI 2021: 43 ML/MIN/1.73M2 (ref 60–?)
EGFRCR SERPLBLD CKD-EPI 2021: 43 ML/MIN/1.73M2 (ref 60–?)
EOSINOPHIL # BLD AUTO: 0.28 X10(3) UL (ref 0–0.7)
EOSINOPHIL NFR BLD AUTO: 3.4 %
ERYTHROCYTE [DISTWIDTH] IN BLOOD BY AUTOMATED COUNT: 21.5 %
GLOBULIN PLAS-MCNC: 3.7 G/DL (ref 2.8–4.4)
GLUCOSE BLD-MCNC: 88 MG/DL (ref 70–99)
GLUCOSE BLD-MCNC: 88 MG/DL (ref 70–99)
HCT VFR BLD AUTO: 26.5 %
HGB BLD-MCNC: 8.4 G/DL
IMM GRANULOCYTES # BLD AUTO: 0.03 X10(3) UL (ref 0–1)
IMM GRANULOCYTES NFR BLD: 0.4 %
LYMPHOCYTES # BLD AUTO: 1.3 X10(3) UL (ref 1–4)
LYMPHOCYTES NFR BLD AUTO: 15.6 %
MCH RBC QN AUTO: 23.3 PG (ref 26–34)
MCHC RBC AUTO-ENTMCNC: 31.7 G/DL (ref 31–37)
MCV RBC AUTO: 73.4 FL
MONOCYTES # BLD AUTO: 0.99 X10(3) UL (ref 0.1–1)
MONOCYTES NFR BLD AUTO: 11.9 %
NEUTROPHILS # BLD AUTO: 5.63 X10 (3) UL (ref 1.5–7.7)
NEUTROPHILS # BLD AUTO: 5.63 X10(3) UL (ref 1.5–7.7)
NEUTROPHILS NFR BLD AUTO: 67.7 %
OSMOLALITY SERPL CALC.SUM OF ELEC: 307 MOSM/KG (ref 275–295)
OSMOLALITY SERPL CALC.SUM OF ELEC: 307 MOSM/KG (ref 275–295)
PLATELET # BLD AUTO: 256 10(3)UL (ref 150–450)
POTASSIUM SERPL-SCNC: 3.4 MMOL/L (ref 3.5–5.1)
POTASSIUM SERPL-SCNC: 3.4 MMOL/L (ref 3.5–5.1)
POTASSIUM SERPL-SCNC: 4.1 MMOL/L (ref 3.5–5.1)
PROT SERPL-MCNC: 6.3 G/DL (ref 6.4–8.2)
RBC # BLD AUTO: 3.61 X10(6)UL
SODIUM SERPL-SCNC: 146 MMOL/L (ref 136–145)
SODIUM SERPL-SCNC: 146 MMOL/L (ref 136–145)
WBC # BLD AUTO: 8.3 X10(3) UL (ref 4–11)

## 2024-06-26 PROCEDURE — 99231 SBSQ HOSP IP/OBS SF/LOW 25: CPT | Performed by: HOSPITALIST

## 2024-06-26 RX ORDER — TORSEMIDE 20 MG/1
20 TABLET ORAL
Status: DISCONTINUED | OUTPATIENT
Start: 2024-06-26 | End: 2024-06-27

## 2024-06-26 RX ORDER — LOSARTAN POTASSIUM 25 MG/1
25 TABLET ORAL DAILY
Status: DISCONTINUED | OUTPATIENT
Start: 2024-06-26 | End: 2024-06-27

## 2024-06-26 RX ORDER — POTASSIUM CHLORIDE 20 MEQ/1
40 TABLET, EXTENDED RELEASE ORAL EVERY 4 HOURS
Status: COMPLETED | OUTPATIENT
Start: 2024-06-26 | End: 2024-06-26

## 2024-06-26 NOTE — PLAN OF CARE
Patient alert, oriented to self only. Pleasant, cooperative. O2 sat > 90% on room air. V-paced on tele. VSS. Denies pain. Continuing IV Lasix BID. Incontinent of bowel and bladder, external urinary catheter in place. Tolerating nectar thick liquids via teaspoon. Meds crushed in puree. Discharge planning ongoing. Plan of care reviewed with patient.     Problem: PAIN - ADULT  Goal: Verbalizes/displays adequate comfort level or patient's stated pain goal  Description: INTERVENTIONS:  - Encourage pt to monitor pain and request assistance  - Assess pain using appropriate pain scale  - Administer analgesics based on type and severity of pain and evaluate response  - Implement non-pharmacological measures as appropriate and evaluate response  - Consider cultural and social influences on pain and pain management  - Manage/alleviate anxiety  - Utilize distraction and/or relaxation techniques  - Monitor for opioid side effects  - Notify MD/LIP if interventions unsuccessful or patient reports new pain  - Anticipate increased pain with activity and pre-medicate as appropriate  Outcome: Progressing     Problem: Patient/Family Goals  Goal: Patient/Family Long Term Goal  Description: Patient's Long Term Goal: Stay out of hospital    Interventions:  - medication compliance  -follow up apts   - See additional Care Plan goals for specific interventions  Outcome: Progressing  Goal: Patient/Family Short Term Goal  Description: Patient's Short Term Goal: go home     Interventions:   -video swallow test 6/25  - cardiology to see   - See additional Care Plan goals for specific interventions  Outcome: Progressing     Problem: SAFETY ADULT - FALL  Goal: Free from fall injury  Description: INTERVENTIONS:  - Assess pt frequently for physical needs  - Identify cognitive and physical deficits and behaviors that affect risk of falls.  - [place] fall precautions as indicated by assessment.  - Educate pt/family on patient safety including  physical limitations  - Instruct pt to call for assistance with activity based on assessment  - Modify environment to reduce risk of injury  - Provide assistive devices as appropriate  - Consider OT/PT consult to assist with strengthening/mobility  - Encourage toileting schedule  Outcome: Progressing     Problem: DISCHARGE PLANNING  Goal: Discharge to home or other facility with appropriate resources  Description: INTERVENTIONS:  - Identify barriers to discharge w/pt and caregiver  - Include patient/family/discharge partner in discharge planning  - Arrange for needed discharge resources and transportation as appropriate  - Identify discharge learning needs (meds, wound care, etc)  - Arrange for interpreters to assist at discharge as needed  - Consider post-discharge preferences of patient/family/discharge partner  - Complete POLST form as appropriate  - Assess patient's ability to be responsible for managing their own health  - Refer to Case Management Department for coordinating discharge planning if the patient needs post-hospital services based on physician/LIP order or complex needs related to functional status, cognitive ability or social support system  Outcome: Progressing

## 2024-06-26 NOTE — SLP NOTE
SPEECH DAILY NOTE - INPATIENT    ASSESSMENT & PLAN   ASSESSMENT  Patient is a 89 female seen today to monitor current diet tolerance and teach compensatory strategies. Patient's  at bedside denied patient with any coughing or choking with modified diet. Discussed options for purchasing thickening agents following discharge from hospital. Education provided re: results of yesterday's VFSS and recommended compensatory strategies. Recommended that patient utilize a bolus hold when consuming mildly thick liquids via spoon to compensate for delayed pharyngeal swallow initiation.  Patient received alert in bed and tolerating RA. Patient utilized a bolus hold given moderate cues x6 trials of thin liquids via spoon. Cough x1 noted with initial trial. Pharyngeal swallow initiation appeared improved with use of bolus hold.    Recommend continued regular diet and mildly thick liquids. Liquids should be presented via spoon only and accompanied by a bolus hold. Patient should continue to initiate multiple reflexive swallows per bolus to clear pharyngeal residue. All PO intake should be consumed at a slow rate in an upright position. Avoid straws. Recommend patient and family seek home health speech therapy services for continued reinforcement of compensatory strategies and overall dysphagia management. SLP to follow up to reinforce aspiration precautions and compensatory strategies and monitor current diet tolerance. Education provided re: results and recommendations.     Diet Recommendations - Solids: Regular  Diet Recommendations - Liquids: Nectar thick liquids/ Mildly thick    Compensatory Strategies Recommended: Liquids via spoon;Small bites and sips;Multiple swallows;No straws (Prolonged bolus hold)  Aspiration Precautions: Upright position  Medication Administration Recommendations: Crushed in puree    Patient Experiencing Pain: No                Treatment Plan  Treatment Plan/Recommendations: Aspiration  precautions;Dysphagia therapy                G   GOALS  Goal #1 The patient will tolerate regular consistency and mildly thick liquids without overt signs or symptoms of aspiration with 95 % accuracy over 1-2 session(s).  In Progress   Goal #2 The patient/family/caregiver will demonstrate understanding and implementation of aspiration precautions and swallow strategies independently over 1-2 session(s).     In Progress   Goal #3 The patient will tolerate trial upgrade of regular consistency and thin liquids without overt signs or symptoms of aspiration with 95 % accuracy over 3-4 session(s).  In Progress   Goal #4 VFSS      Met                                     FOLLOW UP  Follow Up Needed (Documentation Required): Yes  SLP Follow-up Date: 06/27/24       Session: 1    If you have any questions, please contact Merari MERCEDES

## 2024-06-26 NOTE — PLAN OF CARE
Assumed patient care, patient is alert and oriented x1. On room air.V-paced on tele, no complains of chest pain. Bed in lower position, call light within reach.  at bedside, questions are answered, plan of care updated.     Problem: PAIN - ADULT  Goal: Verbalizes/displays adequate comfort level or patient's stated pain goal  Description: INTERVENTIONS:  - Encourage pt to monitor pain and request assistance  - Assess pain using appropriate pain scale  - Administer analgesics based on type and severity of pain and evaluate response  - Implement non-pharmacological measures as appropriate and evaluate response  - Consider cultural and social influences on pain and pain management  - Manage/alleviate anxiety  - Utilize distraction and/or relaxation techniques  - Monitor for opioid side effects  - Notify MD/LIP if interventions unsuccessful or patient reports new pain  - Anticipate increased pain with activity and pre-medicate as appropriate  Outcome: Progressing

## 2024-06-26 NOTE — PROGRESS NOTES
Progress Note  Rosalia Peterson Patient Status:  Inpatient    2/3/1932 MRN [id number]   Location [facility] 8NE-A Attending Danny Nelson MD   Hosp Day # 2 PCP Pepe Oconnell, DO     Subjective:  No concerns overnight. Breathing and her cough feels improved. She is on room air.  is at bedside.   She is wheelchair bound. Difficult getting to office appointments. We discussed the risks/benefits of resuming Xarelto. She has been having intermittent vaginal bleeding for the past month and they do not plan to do further work-up with goals of care.     Objective:  /63 (BP Location: Left arm)   Pulse 70   Temp 98.2 °F (36.8 °C) (Oral)   Resp 22   Ht 5' 3\" (1.6 m)   Wt 155 lb 6.8 oz (70.5 kg)   SpO2 98%   BMI 27.53 kg/m²     Intake/Output:    Intake/Output Summary (Last 24 hours) at 2024 0854  Last data filed at 2024 0820  Gross per 24 hour   Intake 200 ml   Output 700 ml   Net -500 ml       Last 3 Weights   24 0347 155 lb 6.8 oz (70.5 kg)   24 0354 159 lb 13.3 oz (72.5 kg)   24 0545 160 lb 6.4 oz (72.8 kg)   24 1230 169 lb 12.1 oz (77 kg)   24 0844 170 lb (77.1 kg)   23 0035 158 lb (71.7 kg)   23 158 lb (71.7 kg)       Labs:  Recent Labs   Lab 24  0511 24  1359 24  0608 24  0420   GLU 83  --  95 88  88   BUN 21  --  21 27*  27*   CREATSERUM 1.07*  --  1.14* 1.18*  1.18*   EGFRCR 49*  --  45* 43*  43*   CA 8.5  --  8.9 8.8  8.8   *  --  145 146*  146*   K 3.5 5.0 4.2 3.4*  3.4*   *  --  112 108  108   CO2 28.0  --  30.0 32.0  32.0     Recent Labs   Lab 24  1243 24  0511 24  0420   RBC 4.01 3.79* 3.61*   HGB 9.4* 8.6* 8.4*   HCT 30.6* 28.9* 26.5*   MCV 76.3* 76.3* 73.4*   MCH 23.4* 22.7* 23.3*   MCHC 30.7* 29.8* 31.7   RDW 21.6 21.5 21.5   NEPRELIM 6.62  --  5.63   WBC 7.8 7.8 8.3   .0 250.0 256.0         No results for input(s): \"TROP\", \"TROPHS\", \"CK\" in the last 168  hours.    Diagnostics:   Telemetry: AFIB with V pacing, PVCs    CXR 6/23  CONCLUSION:    Findings are suggestive of fluid overload/CHF     TTE 6/24/24  1. Left ventricle: The cavity size was normal. Wall thickness was normal.      Systolic function was normal. The estimated ejection fraction was 50-55%,      by visual assessment. No diagnostic evidence for regional wall motion      abnormalities.   2. Right ventricle: Pacer wire noted in the right ventricle. Systolic      pressure was moderately to severely increased.   3. Left atrium: The left atrial volume was markedly increased.   4. Right atrium: The atrium was moderately dilated. Pacer wire noted in      right atrium.   5. Aortic valve: A bioprosthetic valve is present. The prosthesis had a      normal range of motion. The sewing ring appeared normal, had no rocking      motion, and showed no evidence of dehiscence. There was mild to moderate      regurgitation. The peak systolic velocity was 1.4m/sec. The mean systolic      gradient was 3mm Hg. The valve area (VTI) was 2.55cm^2. The valve area      (VTI) index was 1.45cm^2/m^2.   6. Aortic root: The aortic root was mildly dilated and 3.7cm diameter. The      aortic root was dilated and 4.7cm diameter.   7. Mitral valve: Prior procedures include surgical repair. The annulus was      moderately fibrotic. There was mild to moderate regurgitation. The mean      diastolic gradient was 3mm Hg.   8. Tricuspid valve: There was moderate regurgitation.   9. Pulmonary arteries: Systolic pressure was moderately to markedly      increased, in the range of 65mm Hg to 70mm Hg.   Impressions:  This study is compared with previous dated 08/18/2023: The   PASP has increased from 46 mmHg.     ROS    Physical Exam:  General: Alert and oriented in no apparent distress.  HEENT: Pupils equal. Mucous membranes moist.   Neck: No JVD  Cardiac:  Normal S1 S2, Regular. No murmur  Lungs: Clear without wheezes or crackles    Abdomen: Soft,  non-tender, ND  Extremities: Without clubbing, cyanosis or edema.    Neurologic: No focal deficits. Normal affect.  Skin: Warm and dry,    Medications:   potassium chloride  40 mEq Oral Q4H    amLODIPine  10 mg Oral Daily    ferrous sulfate  325 mg Oral Daily with breakfast    hydrALAZINE  25 mg Oral Q8H CHUYITA    furosemide  40 mg Intravenous BID (Diuretic)    aspirin  81 mg Oral Daily    atorvastatin  20 mg Oral Daily    predniSONE  5 mg Oral Daily         Assessment:    Acute HFpEF, LVEF 50-55%  Decompensated on arrival  Appears relatively euvolemic on exam 6/26  GDMT: Start Losartan 25 mg daily. No MRA at this time with goals of care to avoid frequent labs can discuss in OP setting. No SGLT2 with risk for infection as patient is sedentary  Transition IV lasix to PO torsemide 20 mg BID  History of MV repair, bioAVR  Stable gradients on TTE 6/24. ASA  Pulmonary HTN, PASP 65-70  Chronic AFIB s/p AVN ablation/PPM  Xarelto held due to anemia.   Vaginal bleeding, acute on chronic anemia  Hgb 8.4. No plans for further invasive work-up per   Advanced dementia  CAD, history CABG  ASA/statin  PMR, steroids  HTN  Slightly hypertensive this morning. Amlodipine increased 6/25. Add losartan. Hold hydralazine.    Plan:    Transition IV lasix to PO torsemide 20 mg BID  Supplement potassium  Discontinue hydralazine 25 mg TID and change to Losartan 25 mg daily. Monitor blood pressure.   Discussed risks/benefits of resuming DOAC extensively with patient's . He will contemplate options.  CODE status has been changed from FULL to DNR to align with goals of care  Likely discharge tomorrow    Plan of care discussed with patient, RN.    YELITZA Stubbs  6/26/2024  8:54 AM    Patient seen and examined independently.  Note reviewed and labs reviewed. Agree with above assessment and plan.  89-year-old female who presented with acute on chronic HFpEF.  She has prior history of mitral valve repair and a bioprosthetic AVR.   Echocardiogram completed on 6/24 was notable for stable changes.  Also presented with concern for vaginal bleeding which appears to be a chronic issue.  According to her  however, has not been more significant than spotting.  Today, will plan transition from IV Lasix to p.o. torsemide 20 mg twice daily which is higher than her usual baseline.  We will discontinue hydralazine and changed to losartan given her renal function.  Will plan to monitor throughout the day and into the evening and likely plan for discharge tomorrow clinically stable.        Angel Boyle DO  Cardiologist  [facility]  6/26/2024 11:09 AM      Note to the patient: The 21st Century Cures Act makes medical notes like these available to patients in the interest of transparency. However, be advised that this is a medical document. It is intended as peer to peer communication. It is written in medical language and may contain abbreviations or verbiage that are unfamiliar. It may appear blunt or direct. Medical documents are intended to carry relevant information, facts as evident, and clinical opinion of the practitioner.     Disclaimer: Components of this note were documented using voice recognition system and are subject to errors not corrected at proofreading. Contact the author of this note for any clarifications.

## 2024-06-26 NOTE — PROGRESS NOTES
[facility]   part of [facility]     Hospitalist Progress Note     Rosalia Peterson Patient Status:  Observation    2/3/1932 MRN [id number]   Location [facility] 8NE-A Attending MARIANNA Nelson MD   Hosp Day # 2 PCP Pepe Oconnell DO     Chief Complaint:   Chief Complaint   Patient presents with    Cough/URI       Subjective:   Less coughing per    at bedside   Feeding wife- asking about her swallowing        Objective:    Review of Systems:   3 point ROS completed and was negative, except for pertinent positive and negatives stated in subjective.  Limited w/ pt     Vital signs:  Temp:  [97.7 °F (36.5 °C)-98.4 °F (36.9 °C)] 98.2 °F (36.8 °C)  Pulse:  [70-73] 70  Resp:  [20-24] 22  BP: (122-168)/(47-97) 141/63  SpO2:  [92 %-99 %] 98 %    Physical Exam:    General: awake  person only   Respiratory: Clear to auscultation bilaterally. No wheezes. No rhonchi.  RA   Cardiovascular: S1, S2. Regular rate and rhythm. No murmurs.  Paced   Abdomen: Soft, nontender, nondistended.    Extremities: No edema.    Diagnostic Data:    Labs:  Recent Labs   Lab 24  1243 24  0511 24  0420   WBC 7.8 7.8 8.3   HGB 9.4* 8.6* 8.4*   MCV 76.3* 76.3* 73.4*   .0 250.0 256.0       Recent Labs   Lab 24  1249 24  0511 24  1359 24  0608 24  0420   * 83  --  95 88  88   BUN 21 21  --  21 27*  27*   CREATSERUM 1.17* 1.07*  --  1.14* 1.18*  1.18*   CA 8.8 8.5  --  8.9 8.8  8.8   ALB 2.8* 2.6*  --   --  2.6*    147*  --  145 146*  146*   K 4.5 3.5 5.0 4.2 3.4*  3.4*    113*  --  112 108  108   CO2 26.0 28.0  --  30.0 32.0  32.0   ALKPHO 57 49*  --   --  51*   AST 70* 90*  --   --  79*   ALT 92* 124*  --   --  128*   BILT 0.7 0.6  --   --  0.6   TP 7.1 6.5  --   --  6.3*       Estimated Creatinine Clearance: 25.2 mL/min (A) (based on SCr of 1.18 mg/dL (H)).    No results for input(s): \"PTP\", \"INR\" in the last 168 hours.         COVID-19 Lab  Results    COVID-19  Lab Results   Component Value Date    COVID19 Not Detected 06/23/2024    COVID19 Not Detected 08/18/2023    COVID19 Not Detected 08/17/2023       Pro-Calcitonin  No results for input(s): \"PCT\" in the last 168 hours.    Cardiac  Recent Labs   Lab 06/23/24  1249   PBNP 5,881*       Creatinine Kinase  No results for input(s): \"CK\" in the last 168 hours.    Inflammatory Markers  Recent Labs   Lab 06/23/24  1249   HAO 53.0       No results for input(s): \"TROP\", \"TROPHS\", \"CK\" in the last 168 hours.    Imaging: Imaging data reviewed in Epic.    Medications:    potassium chloride  40 mEq Oral Q4H    amLODIPine  10 mg Oral Daily    ferrous sulfate  325 mg Oral Daily with breakfast    hydrALAZINE  25 mg Oral Q8H CHUYITA    furosemide  40 mg Intravenous BID (Diuretic)    aspirin  81 mg Oral Daily    atorvastatin  20 mg Oral Daily    predniSONE  5 mg Oral Daily       Assessment & Plan:      #acute HFpEF exacerbation;   - cards following;  - IV lasix;  BID   - monitor lytes  #elevated lfts; likely from congested hepatopathy; monitor  - LFT's still elevated   #anemia-monitor; iron stores somewhat low; started oral iron; some component from blood loss from vaginal bleeding;  spoke with ob/gyn; family previously declined workup for this. Was started on prn norethindrone.  they said ok to hold off norethindrone (though prn would be fine);  holding DOAC per  cards,   - Hgb 8.4 lower   # CAD with h/o CABG  # PPM  # AAA  # s/p bioprosthetic AVR-aspirin for now  #chronic afib-doac held per cards  #Pulm HTN  # PMR on chronic steroids   # CKD stage III - near baseline  - cr 1.18 stable   #advanced dementia  # Dysphagia - passed video swallow  nectar thick, spoonful liquid     POC   Norvasc increased   Holding DOAC 'lives at [facility]  has caregiver 12 hrs day, lives w/ spouse   Follow labs   Up w/ lift   Follow labs   Stressed swallowing instructions to    RN states  asking about code status - will talk to  him      Plan of care discussed with patient and RN,      Eleni Conn, MERCED     Supplementary Documentation:     Quality:  DVT Prophylaxis: scds only for now, given recent vaginal bleeding  CODE status: see chart  Tsang: no  Central line: no      Estimated date of discharge: tbd  At this point Ms. Peterson is expected to be discharge to: tbd

## 2024-06-27 VITALS
HEART RATE: 71 BPM | OXYGEN SATURATION: 99 % | RESPIRATION RATE: 22 BRPM | BODY MASS INDEX: 28.75 KG/M2 | WEIGHT: 162.25 LBS | HEIGHT: 63 IN | SYSTOLIC BLOOD PRESSURE: 86 MMHG | TEMPERATURE: 98 F | DIASTOLIC BLOOD PRESSURE: 48 MMHG

## 2024-06-27 LAB
ALBUMIN SERPL-MCNC: 2.6 G/DL (ref 3.4–5)
ALBUMIN/GLOB SERPL: 0.7 {RATIO} (ref 1–2)
ALP LIVER SERPL-CCNC: 51 U/L
ALT SERPL-CCNC: 127 U/L
ANION GAP SERPL CALC-SCNC: 6 MMOL/L (ref 0–18)
ANION GAP SERPL CALC-SCNC: 6 MMOL/L (ref 0–18)
AST SERPL-CCNC: 84 U/L (ref 15–37)
BASOPHILS # BLD AUTO: 0.05 X10(3) UL (ref 0–0.2)
BASOPHILS NFR BLD AUTO: 0.7 %
BILIRUB SERPL-MCNC: 0.6 MG/DL (ref 0.1–2)
BUN BLD-MCNC: 28 MG/DL (ref 9–23)
BUN BLD-MCNC: 28 MG/DL (ref 9–23)
CALCIUM BLD-MCNC: 8.7 MG/DL (ref 8.5–10.1)
CALCIUM BLD-MCNC: 8.7 MG/DL (ref 8.5–10.1)
CHLORIDE SERPL-SCNC: 111 MMOL/L (ref 98–112)
CHLORIDE SERPL-SCNC: 111 MMOL/L (ref 98–112)
CO2 SERPL-SCNC: 29 MMOL/L (ref 21–32)
CO2 SERPL-SCNC: 29 MMOL/L (ref 21–32)
CREAT BLD-MCNC: 1.15 MG/DL
CREAT BLD-MCNC: 1.15 MG/DL
EGFRCR SERPLBLD CKD-EPI 2021: 45 ML/MIN/1.73M2 (ref 60–?)
EGFRCR SERPLBLD CKD-EPI 2021: 45 ML/MIN/1.73M2 (ref 60–?)
EOSINOPHIL # BLD AUTO: 0.25 X10(3) UL (ref 0–0.7)
EOSINOPHIL NFR BLD AUTO: 3.3 %
ERYTHROCYTE [DISTWIDTH] IN BLOOD BY AUTOMATED COUNT: 22 %
GLOBULIN PLAS-MCNC: 3.9 G/DL (ref 2.8–4.4)
GLUCOSE BLD-MCNC: 84 MG/DL (ref 70–99)
GLUCOSE BLD-MCNC: 84 MG/DL (ref 70–99)
HCT VFR BLD AUTO: 28.3 %
HGB BLD-MCNC: 8.5 G/DL
IMM GRANULOCYTES # BLD AUTO: 0.02 X10(3) UL (ref 0–1)
IMM GRANULOCYTES NFR BLD: 0.3 %
LYMPHOCYTES # BLD AUTO: 1.37 X10(3) UL (ref 1–4)
LYMPHOCYTES NFR BLD AUTO: 17.8 %
MCH RBC QN AUTO: 22.7 PG (ref 26–34)
MCHC RBC AUTO-ENTMCNC: 30 G/DL (ref 31–37)
MCV RBC AUTO: 75.7 FL
MONOCYTES # BLD AUTO: 0.98 X10(3) UL (ref 0.1–1)
MONOCYTES NFR BLD AUTO: 12.7 %
NEUTROPHILS # BLD AUTO: 5.02 X10 (3) UL (ref 1.5–7.7)
NEUTROPHILS # BLD AUTO: 5.02 X10(3) UL (ref 1.5–7.7)
NEUTROPHILS NFR BLD AUTO: 65.2 %
NT-PROBNP SERPL-MCNC: 2784 PG/ML (ref ?–450)
OSMOLALITY SERPL CALC.SUM OF ELEC: 307 MOSM/KG (ref 275–295)
OSMOLALITY SERPL CALC.SUM OF ELEC: 307 MOSM/KG (ref 275–295)
PLATELET # BLD AUTO: 278 10(3)UL (ref 150–450)
POTASSIUM SERPL-SCNC: 4.2 MMOL/L (ref 3.5–5.1)
POTASSIUM SERPL-SCNC: 4.2 MMOL/L (ref 3.5–5.1)
PROT SERPL-MCNC: 6.5 G/DL (ref 6.4–8.2)
RBC # BLD AUTO: 3.74 X10(6)UL
SODIUM SERPL-SCNC: 146 MMOL/L (ref 136–145)
SODIUM SERPL-SCNC: 146 MMOL/L (ref 136–145)
WBC # BLD AUTO: 7.7 X10(3) UL (ref 4–11)

## 2024-06-27 RX ORDER — POTASSIUM CHLORIDE 20 MEQ/1
20 TABLET, EXTENDED RELEASE ORAL 2 TIMES DAILY
Status: DISCONTINUED | OUTPATIENT
Start: 2024-06-27 | End: 2024-06-27

## 2024-06-27 RX ORDER — FERROUS SULFATE 325(65) MG
325 TABLET, DELAYED RELEASE (ENTERIC COATED) ORAL
Qty: 30 TABLET | Refills: 0 | Status: SHIPPED | OUTPATIENT
Start: 2024-06-28 | End: 2024-06-27

## 2024-06-27 RX ORDER — AMLODIPINE BESYLATE 10 MG/1
10 TABLET ORAL DAILY
Qty: 30 TABLET | Refills: 1 | Status: SHIPPED | OUTPATIENT
Start: 2024-06-28

## 2024-06-27 RX ORDER — FERRIC GLYCINATE 18 MG/15ML
18 LIQUID (ML) ORAL DAILY
Qty: 100 ML | Refills: 0 | Status: SHIPPED | OUTPATIENT
Start: 2024-06-27

## 2024-06-27 RX ORDER — TORSEMIDE 20 MG/1
20 TABLET ORAL
Qty: 60 TABLET | Refills: 1 | Status: SHIPPED | OUTPATIENT
Start: 2024-06-27

## 2024-06-27 RX ORDER — LOSARTAN POTASSIUM 25 MG/1
25 TABLET ORAL DAILY
Qty: 30 TABLET | Refills: 1 | Status: SHIPPED | OUTPATIENT
Start: 2024-06-28

## 2024-06-27 NOTE — PLAN OF CARE
Assumed patient care, patient is alert and oriented x1. On room air.V-paced on tele, no complains of chest pain. Bed in lower position, call light within reach.  at bedside, questions are answered, plan of care updated.     POC: patient will discharge to home later today.     Problem: PAIN - ADULT  Goal: Verbalizes/displays adequate comfort level or patient's stated pain goal  Description: INTERVENTIONS:  - Encourage pt to monitor pain and request assistance  - Assess pain using appropriate pain scale  - Administer analgesics based on type and severity of pain and evaluate response  - Implement non-pharmacological measures as appropriate and evaluate response  - Consider cultural and social influences on pain and pain management  - Manage/alleviate anxiety  - Utilize distraction and/or relaxation techniques  - Monitor for opioid side effects  - Notify MD/LIP if interventions unsuccessful or patient reports new pain  - Anticipate increased pain with activity and pre-medicate as appropriate  Outcome: Progressing

## 2024-06-27 NOTE — PLAN OF CARE
Nursing discharge note  Pt discharge home, IV removed, Tele DC and returned to monitor tech. F/U instructions provided and discussed with , he verbalized understanding. Prescriptions given, discussed adverse reactions and side effects of all new medications, and provided appropriate handouts.  verbalized understanding. Pt picked up via ambulance with all belongings. Pt denies C/O pain, malaise , or cardiac S/S. All needs met by staff.

## 2024-06-27 NOTE — PROGRESS NOTES
[facility]   part of [facility]     Hospitalist Progress Note     Rosalia Peterson Patient Status:  Inpatient    2/3/1932 MRN [id number]   Location [facility] 8NE-A Attending Bobby Blanton MD   Hosp Day # 3 PCP Pepe Oconnell DO     Chief Complaint: HF    Subjective:   Patient is pleasantly confused. On room air.     Current medications:   potassium chloride  20 mEq Oral BID    losartan  25 mg Oral Daily    torsemide  20 mg Oral BID (Diuretic)    rivaroxaban  15 mg Oral Daily with dinner    amLODIPine  10 mg Oral Daily    ferrous sulfate  325 mg Oral Daily with breakfast    aspirin  81 mg Oral Daily    atorvastatin  20 mg Oral Daily    predniSONE  5 mg Oral Daily       Objective:    Review of Systems:   Limited d/t pateint factors.     Vital signs:  Temp:  [97.3 °F (36.3 °C)-98.2 °F (36.8 °C)] 97.8 °F (36.6 °C)  Pulse:  [70-75] 72  Resp:  [18-22] 18  BP: ()/() 129/62  SpO2:  [90 %-99 %] 99 %  Patient Weight for the past 72 hrs:   Weight   24 1230 169 lb 12.1 oz (77 kg)   24 0545 160 lb 6.4 oz (72.8 kg)   24 0354 159 lb 13.3 oz (72.5 kg)     Physical Exam:    General: No acute distress.   Respiratory: Diminished, clear  Cardiovascular: S1, S2. Regular rate and rhythm.  Abdomen: Soft, nontender, nondistended.  Positive bowel sounds.  Extremities: No edema.    Diagnostic Data:    Labs:  Recent Labs   Lab 24  1243 24  0511 24  0420 24  0506   WBC 7.8 7.8 8.3 7.7   HGB 9.4* 8.6* 8.4* 8.5*   MCV 76.3* 76.3* 73.4* 75.7*   .0 250.0 256.0 278.0       Recent Labs   Lab 24  0511 24  1359 24  0608 24  0420 24  1630 24  0506   GLU 83  --  95 88  88  --  84  84   BUN 21  --  21 27*  27*  --  28*  28*   CREATSERUM 1.07*  --  1.14* 1.18*  1.18*  --  1.15*  1.15*   CA 8.5  --  8.9 8.8  8.8  --  8.7  8.7   ALB 2.6*  --   --  2.6*  --  2.6*   *  --  145 146*  146*  --  146*  146*   K 3.5   < > 4.2 3.4*   3.4* 4.1 4.2  4.2   *  --  112 108  108  --  111  111   CO2 28.0  --  30.0 32.0  32.0  --  29.0  29.0   ALKPHO 49*  --   --  51*  --  51*   AST 90*  --   --  79*  --  84*   *  --   --  128*  --  127*   BILT 0.6  --   --  0.6  --  0.6   TP 6.5  --   --  6.3*  --  6.5    < > = values in this interval not displayed.       Estimated Creatinine Clearance: 25.8 mL/min (A) (based on SCr of 1.15 mg/dL (H)).    No results for input(s): \"PTP\", \"INR\" in the last 168 hours.         COVID-19 Lab Results    COVID-19  Lab Results   Component Value Date    COVID19 Not Detected 06/23/2024    COVID19 Not Detected 08/18/2023    COVID19 Not Detected 08/17/2023       Pro-Calcitonin  No results for input(s): \"PCT\" in the last 168 hours.    Cardiac  Recent Labs   Lab 06/23/24  1249   PBNP 5,881*       Creatinine Kinase  No results for input(s): \"CK\" in the last 168 hours.    Inflammatory Markers  Recent Labs   Lab 06/23/24  1249   HAO 53.0       No results for input(s): \"TROP\", \"TROPHS\", \"CK\" in the last 168 hours.    Imaging: Imaging data reviewed in Epic.    Medications:    potassium chloride  20 mEq Oral BID    losartan  25 mg Oral Daily    torsemide  20 mg Oral BID (Diuretic)    rivaroxaban  15 mg Oral Daily with dinner    amLODIPine  10 mg Oral Daily    ferrous sulfate  325 mg Oral Daily with breakfast    aspirin  81 mg Oral Daily    atorvastatin  20 mg Oral Daily    predniSONE  5 mg Oral Daily       Assessment & Plan:    Acute on chronic heart failure with preserved EF, ECHO EF 50-55%  Pulmonary hypertension  Torsemide   Daily weight  Cardiology consult   Atrial fibrillation on DOAC  SSS sp PPM  CAD sp CABG sp bioprosthetic AVR, MV repair  AAA  Essential hypertension  Dyslipidemia  Aspirin  Norvasc  Losartan  Lipitor  DOAC  Monitor hemodynamics  Telemetry   Anemia, mixed iron deficiency and acute blood loss  Iron replacement   Monitor H&H   Vaginal bleeding, per chart work-up declined in the past, Norethindrone  PRN  Monitor   PMR on chronic steroids  Prednisone   Transaminitis, passive congestion   Dementia     Supplementary Documentation:   Quality:  DVT Prophylaxis: SCDs    Home today.    Plan of care discussed with patient, family, NP.    Danny Nelson MD

## 2024-06-27 NOTE — SLP NOTE
SPEECH DAILY NOTE - INPATIENT    ASSESSMENT & PLAN   ASSESSMENT  Pt seen for dysphagia tx to assess tolerance with recommended diet, ensure proper utilization of aspiration precautions and provide pt/family education. Spouse present and feeding patient lunch meal. Patient received awake, alert, and smiling. Patient on room air. Discharge education and counseling completed with patient/spouse regarding dysphagia recommendations from recent VFSS, swallow strategies and aspiration precautions.  Reviewed pillars of pneumonia and recommended daily, good oral care/hygiene for patient. Spouse asked pertinent questions and dysphagia recommendations provided in print/written form to take with.  All questions were responded to spouse understanding.   Recommend continued regular diet and mildly thick liquids. Liquids should be presented via spoon only and accompanied by a bolus hold. Patient should continue to initiate multiple reflexive swallows per bolus to clear pharyngeal residue. All PO intake should be consumed at a slow rate in an upright position. Avoid straws. Recommend patient and family seek home health speech therapy services for continued reinforcement of compensatory strategies and overall dysphagia management.       Diet Recommendations - Solids: Regular  Diet Recommendations - Liquids: Nectar thick liquids/ Mildly thick  1:1 slow feeding by teaspoon;  supportive feeding approach  Compensatory Strategies Recommended: Liquids via spoon;Small bites and sips;Multiple swallows;No straws (Prolonged bolus hold)  Aspiration Precautions: Upright position  Medication Administration Recommendations: Crushed in puree    Patient Experiencing Pain: No           Treatment Plan  Treatment Plan/Recommendations: No further inpatient SLP service warranted    Interdisciplinary Communication: Discussed with RN            GOALS  Goal #1 The patient will tolerate regular consistency and mildly thick liquids without overt signs or  symptoms of aspiration with 95 % accuracy over 1-2 session(s). Met   Goal #2 The patient/family/caregiver will demonstrate understanding and implementation of aspiration precautions and swallow strategies independently over 1-2 session(s).    Met   Goal #3 The patient will tolerate trial upgrade of regular consistency and thin liquids without overt signs or symptoms of aspiration with 95 % accuracy over 3-4 session(s). Not address 2/2 patient discharge today   Goal #4 VFSS      Met       FOLLOW UP  Follow Up Needed (Documentation Required): No, 2/2 discharge today         Session: 2    If you have any questions, please contact Daisy Herbert, SLP  Daisy Herbert, MS CCC-SLP/L, pager [phone]  Speech-LanguagePathologist

## 2024-06-27 NOTE — PROGRESS NOTES
Progress Note  Rosalia Peterson Patient Status:  Inpatient    2/3/1932 MRN [id number]   Location [facility] 8NE-A Attending Danny Nelson MD   Hosp Day # 3 PCP Pepe Oconnell DO     Subjective:  She is resting comfortably sleeping nearly flat on room air. No concerns overnight. Mild vaginal spotting briefly noted yesterday evening but no significant bleeding. Hgb stable.     Objective:  /62 (BP Location: Left arm)   Pulse 75   Temp 97.8 °F (36.6 °C) (Axillary)   Resp 18   Ht 5' 3\" (1.6 m)   Wt 162 lb 4.1 oz (73.6 kg)   SpO2 99%   BMI 28.74 kg/m²     Intake/Output:    Intake/Output Summary (Last 24 hours) at 2024 0949  Last data filed at 2024 0530  Gross per 24 hour   Intake --   Output 1100 ml   Net -1100 ml       Last 3 Weights   24 0514 162 lb 4.1 oz (73.6 kg)   24 0347 155 lb 6.8 oz (70.5 kg)   24 0354 159 lb 13.3 oz (72.5 kg)   24 0545 160 lb 6.4 oz (72.8 kg)   24 1230 169 lb 12.1 oz (77 kg)   24 0844 170 lb (77.1 kg)   23 0035 158 lb (71.7 kg)   23 158 lb (71.7 kg)       Labs:  Recent Labs   Lab 24  0608 24  0420 24  1630 24  0506   GLU 95 88  88  --  84  84   BUN 21 27*  27*  --  28*  28*   CREATSERUM 1.14* 1.18*  1.18*  --  1.15*  1.15*   EGFRCR 45* 43*  43*  --  45*  45*   CA 8.9 8.8  8.8  --  8.7  8.7    146*  146*  --  146*  146*   K 4.2 3.4*  3.4* 4.1 4.2  4.2    108  108  --  111  111   CO2 30.0 32.0  32.0  --  29.0  29.0     Recent Labs   Lab 24  1243 24  0511 24  0420 24  0506   RBC 4.01 3.79* 3.61* 3.74*   HGB 9.4* 8.6* 8.4* 8.5*   HCT 30.6* 28.9* 26.5* 28.3*   MCV 76.3* 76.3* 73.4* 75.7*   MCH 23.4* 22.7* 23.3* 22.7*   MCHC 30.7* 29.8* 31.7 30.0*   RDW 21.6 21.5 21.5 22.0   NEPRELIM 6.62  --  5.63 5.02   WBC 7.8 7.8 8.3 7.7   .0 250.0 256.0 278.0         No results for input(s): \"TROP\", \"TROPHS\", \"CK\" in the last 168  hours.    Diagnostics:   Telemetry: V paced underlying AFIB    CXR 6/23  CONCLUSION:    Findings are suggestive of fluid overload/CHF      TTE 6/24/24  1. Left ventricle: The cavity size was normal. Wall thickness was normal.      Systolic function was normal. The estimated ejection fraction was 50-55%,      by visual assessment. No diagnostic evidence for regional wall motion      abnormalities.   2. Right ventricle: Pacer wire noted in the right ventricle. Systolic      pressure was moderately to severely increased.   3. Left atrium: The left atrial volume was markedly increased.   4. Right atrium: The atrium was moderately dilated. Pacer wire noted in      right atrium.   5. Aortic valve: A bioprosthetic valve is present. The prosthesis had a      normal range of motion. The sewing ring appeared normal, had no rocking      motion, and showed no evidence of dehiscence. There was mild to moderate      regurgitation. The peak systolic velocity was 1.4m/sec. The mean systolic      gradient was 3mm Hg. The valve area (VTI) was 2.55cm^2. The valve area      (VTI) index was 1.45cm^2/m^2.   6. Aortic root: The aortic root was mildly dilated and 3.7cm diameter. The      aortic root was dilated and 4.7cm diameter.   7. Mitral valve: Prior procedures include surgical repair. The annulus was      moderately fibrotic. There was mild to moderate regurgitation. The mean      diastolic gradient was 3mm Hg.   8. Tricuspid valve: There was moderate regurgitation.   9. Pulmonary arteries: Systolic pressure was moderately to markedly      increased, in the range of 65mm Hg to 70mm Hg.   Impressions:  This study is compared with previous dated 08/18/2023: The   PASP has increased from 46 mmHg.   Review of Systems   Cardiovascular:  Negative for chest pain and leg swelling.   Respiratory:  Negative for shortness of breath.        Physical Exam:  General: Alert and in no apparent distress.  HEENT: Pupils equal. Mucous membranes moist.    Neck: No JVD  Cardiac:  Normal S1 S2, Regular. No murmur  Lungs: Clear without wheezes or crackles    Abdomen: Soft, non-tender, ND  Extremities: Without clubbing, cyanosis or edema.    Neurologic: No focal deficits. Normal affect.  Skin: Warm and dry,    Medications:   losartan  25 mg Oral Daily    torsemide  20 mg Oral BID (Diuretic)    rivaroxaban  15 mg Oral Daily with dinner    amLODIPine  10 mg Oral Daily    ferrous sulfate  325 mg Oral Daily with breakfast    aspirin  81 mg Oral Daily    atorvastatin  20 mg Oral Daily    predniSONE  5 mg Oral Daily         Assessment:    Acute HFpEF, LVEF 50-55%  Decompensated on arrival  Euvolemic on exam today  GDMT: Losartan 25 mg daily started on 6/26. No MRA at this time with goals of care to avoid frequent labs can discuss in OP setting. No SGLT2 with risk for infection as patient is sedentary  PO torsemide 20 mg BID. Appears to have good UOP overnight.  History of MV repair, bioAVR  Stable gradients on TTE 6/24. ASA  Pulmonary HTN, PASP 65-70  Chronic AFIB s/p AVN ablation/PPM  Xarelto resumed on 6/26. Hgb stable.   Vaginal bleeding, acute on chronic anemia  Hgb 8.5. No plans for further invasive work-up per   Advanced dementia  CAD, history CABG  ASA/statin  PMR, steroids  HTN  Normotensive on amlodipine and losartan    Plan:  Continue PO torsemide 20 mg BID and Kcl 20 meq BID  Blood pressure controlled with new addition of Losartan 25 mg daily. Plan for BMP in 1 week.  Discussed risks/benefits of resuming DOAC extensively with patient's . We have resumed DOAC and recommend further discussions with OP cardiologist.  CODE status has been changed from FULL to DNR to align with goals of care  Stable for discharge this afternoon from cardiac standpoint.     Plan of care discussed with patient, RN. Discussed case with Dr Boyle.     YELITZA Stubbs  6/27/2024  9:49 AM    Note reviewed and labs reviewed. Agree with above assessment and plan.  MDM per me.   89-year-old female who presented with acute on chronic HFpEF.  History of mitral valve repair and bioprosthetic AVR.  Unremarkable changes noted on repeat echocardiogram during this admission.  Also presented with concern for vaginal blood loss which appears to be a chronic issue when discussing with her .  He states that it is usually no more than spotting.  Changed BP regimen to include losartan with adequate control thereafter.  Recommend outpatient metabolic panel.  Will plan for DOAC/aspirin 81 mg daily and reassessment of anticoagulation/antiplatelet therapy as an outpatient if bleeding is to continue.        Angel Boyle DO  Cardiologist  [facility]  6/27/2024 12:17 PM      Note to the patient: The 21st Century Cures Act makes medical notes like these available to patients in the interest of transparency. However, be advised that this is a medical document. It is intended as peer to peer communication. It is written in medical language and may contain abbreviations or verbiage that are unfamiliar. It may appear blunt or direct. Medical documents are intended to carry relevant information, facts as evident, and clinical opinion of the practitioner.     Disclaimer: Components of this note were documented using voice recognition system and are subject to errors not corrected at proofreading. Contact the author of this note for any clarifications.

## 2024-06-27 NOTE — CM/SW NOTE
06/27/24 1143   Discharge disposition   Expected discharge disposition Home or Self   Post Acute Care Provider Home   Discharge transportation [facility]     Pt cleared to discharge today. BLS set up for 1:30pm. PCS form completed.     [facility]: [phone]    BRAIN Huddleston, LSW  Discharge Planner

## 2024-06-27 NOTE — PROGRESS NOTES
[facility]   part of [facility]     Hospitalist Progress Note     Rosalia Peterson Patient Status:  Observation    2/3/1932 MRN [id number]   Location [facility] 8NE-A Attending MARIANNA Nelson MD   Hosp Day # 3 PCP Pepe Oconnell DO     Chief Complaint:   Chief Complaint   Patient presents with    Cough/URI       Subjective:    Being fed by    no coughing today     Objective:    Review of Systems:   3 point ROS completed and was negative, except for pertinent positive and negatives stated in subjective.  Limited w/ pt     Vital signs:  Temp:  [97.3 °F (36.3 °C)-98.2 °F (36.8 °C)] 97.8 °F (36.6 °C)  Pulse:  [70-75] 75  Resp:  [18-22] 18  BP: ()/() 129/62  SpO2:  [90 %-99 %] 99 %    Physical Exam:    General: awake  person only   Respiratory: Clear to auscultation bilaterally. No wheezes. No rhonchi.  RA   Cardiovascular: S1, S2. Regular rate and rhythm. No murmurs.  Paced   Abdomen: Soft, nontender, nondistended.    Extremities: No edema.    Diagnostic Data:    Labs:  Recent Labs   Lab 24  1243 24  0511 24  0420 24  0506   WBC 7.8 7.8 8.3 7.7   HGB 9.4* 8.6* 8.4* 8.5*   MCV 76.3* 76.3* 73.4* 75.7*   .0 250.0 256.0 278.0       Recent Labs   Lab 24  0511 24  1359 24  0608 24  0420 24  1630 24  0506   GLU 83  --  95 88  88  --  84  84   BUN 21  --  21 27*  27*  --  28*  28*   CREATSERUM 1.07*  --  1.14* 1.18*  1.18*  --  1.15*  1.15*   CA 8.5  --  8.9 8.8  8.8  --  8.7  8.7   ALB 2.6*  --   --  2.6*  --  2.6*   *  --  145 146*  146*  --  146*  146*   K 3.5   < > 4.2 3.4*  3.4* 4.1 4.2  4.2   *  --  112 108  108  --  111  111   CO2 28.0  --  30.0 32.0  32.0  --  29.0  29.0   ALKPHO 49*  --   --  51*  --  51*   AST 90*  --   --  79*  --  84*   *  --   --  128*  --  127*   BILT 0.6  --   --  0.6  --  0.6   TP 6.5  --   --  6.3*  --  6.5    < > = values in this interval not displayed.        Estimated Creatinine Clearance: 25.8 mL/min (A) (based on SCr of 1.15 mg/dL (H)).    No results for input(s): \"PTP\", \"INR\" in the last 168 hours.         COVID-19 Lab Results    COVID-19  Lab Results   Component Value Date    COVID19 Not Detected 06/23/2024    COVID19 Not Detected 08/18/2023    COVID19 Not Detected 08/17/2023       Pro-Calcitonin  No results for input(s): \"PCT\" in the last 168 hours.    Cardiac  Recent Labs   Lab 06/23/24  1249   PBNP 5,881*       Creatinine Kinase  No results for input(s): \"CK\" in the last 168 hours.    Inflammatory Markers  Recent Labs   Lab 06/23/24  1249   HAO 53.0       No results for input(s): \"TROP\", \"TROPHS\", \"CK\" in the last 168 hours.    Imaging: Imaging data reviewed in Epic.    Medications:    losartan  25 mg Oral Daily    torsemide  20 mg Oral BID (Diuretic)    rivaroxaban  15 mg Oral Daily with dinner    amLODIPine  10 mg Oral Daily    ferrous sulfate  325 mg Oral Daily with breakfast    aspirin  81 mg Oral Daily    atorvastatin  20 mg Oral Daily    predniSONE  5 mg Oral Daily       Assessment & Plan:      #acute HFpEF exacerbation;   - cards following;  - IV lasix; -> to po demedex 20 mg BID     - monitor lytes  -Cozaar, norvasc   - xarelto started  now had vaginal bleeding overnoc  stable Hgb     #elevated lfts; likely from congested hepatopathy; monitor  - LFT's still elevated     #anemia-monitor; iron stores somewhat low; started oral iron; some component from blood loss from vaginal bleeding;  spoke with ob/gyn; family previously declined workup for this. Was started on prn norethindrone.  they said ok to hold off norethindrone (though prn would be fine);    - Hgb    # CAD with h/o CABG  # PPM  # AAA  # s/p bioprosthetic AVR-aspirin for now  #chronic afib-doac held per cards  #Pulm HTN  # PMR on chronic steroids   # CKD stage III - near baseline  - cr 1.18 stable   #advanced dementia  # Dysphagia - passed video swallow  nectar thick, spoonful liquid     POC    Na 146   Hgb stable 8.5  xarelto resumed on some vaginal bleeding overnoc   LFT's elevated  unchanged   Dc today  check labs next Wed  CMP, cbc   Cont xarelto   PCP to see pt Tues at home     Plan of care discussed with patient and RN,   Dr Nelson, cards MERCED Conn NP     Supplementary Documentation:     Quality:  DVT Prophylaxis: scds only for now, given recent vaginal bleeding  CODE status: see chart  Tsang: no  Central line: no      Estimated date of discharge: tbd  At this point Ms. Peterson is expected to be discharge to: tbd

## 2024-06-27 NOTE — PLAN OF CARE
A&O1, alert to self only. Denies any pain. RA, lung sounds clear w/ intermittent audible wheezing. V-paced on tele. Incontinent of bowel and bladder, purewick in place, last bm 6/26/24. During brief change, mild vaginal bleeding while cleaning.     Bed is locked and in low position. Call light and personal items within reach.  Pt updated with plan of care    Problem: PAIN - ADULT  Goal: Verbalizes/displays adequate comfort level or patient's stated pain goal  Description: INTERVENTIONS:  - Encourage pt to monitor pain and request assistance  - Assess pain using appropriate pain scale  - Administer analgesics based on type and severity of pain and evaluate response  - Implement non-pharmacological measures as appropriate and evaluate response  - Consider cultural and social influences on pain and pain management  - Manage/alleviate anxiety  - Utilize distraction and/or relaxation techniques  - Monitor for opioid side effects  - Notify MD/LIP if interventions unsuccessful or patient reports new pain  - Anticipate increased pain with activity and pre-medicate as appropriate  Outcome: Progressing     Problem: Patient/Family Goals  Goal: Patient/Family Long Term Goal  Description: Patient's Long Term Goal: Stay out of hospital    Interventions:  - medication compliance  -follow up apts   - See additional Care Plan goals for specific interventions  Outcome: Progressing  Goal: Patient/Family Short Term Goal  Description: Patient's Short Term Goal: go home     Interventions:   -video swallow test 6/25  - cardiology to see   - See additional Care Plan goals for specific interventions  Outcome: Progressing     Problem: SAFETY ADULT - FALL  Goal: Free from fall injury  Description: INTERVENTIONS:  - Assess pt frequently for physical needs  - Identify cognitive and physical deficits and behaviors that affect risk of falls.  - [place] fall precautions as indicated by assessment.  - Educate pt/family on patient safety including  physical limitations  - Instruct pt to call for assistance with activity based on assessment  - Modify environment to reduce risk of injury  - Provide assistive devices as appropriate  - Consider OT/PT consult to assist with strengthening/mobility  - Encourage toileting schedule  Outcome: Progressing     Problem: DISCHARGE PLANNING  Goal: Discharge to home or other facility with appropriate resources  Description: INTERVENTIONS:  - Identify barriers to discharge w/pt and caregiver  - Include patient/family/discharge partner in discharge planning  - Arrange for needed discharge resources and transportation as appropriate  - Identify discharge learning needs (meds, wound care, etc)  - Arrange for interpreters to assist at discharge as needed  - Consider post-discharge preferences of patient/family/discharge partner  - Complete POLST form as appropriate  - Assess patient's ability to be responsible for managing their own health  - Refer to Case Management Department for coordinating discharge planning if the patient needs post-hospital services based on physician/LIP order or complex needs related to functional status, cognitive ability or social support system  Outcome: Progressing

## 2024-06-27 NOTE — DISCHARGE SUMMARY
[place] [facility]PITAL[facility]  DISCHARGE SUMMARY     Rosalia Peterson Patient Status:  Inpatient    2/3/1932 MRN [id number]   Location [facility] 8NE-A Attending No att. providers found   Hosp Day # 3 PCP Pepe Oconnell DO     Date of Admission: 2024  Date of Discharge:  2024     Discharge Disposition: Home or Self Care    Discharge Diagnosis:  Acute heart failure preserved EF 50-55%  History of mitral valve repair, bilateral static AVR  Pulmonary hypertension  Chronic atrial fibrillation status post AV node ablation/PPM  Vaginal bleeding acute on chronic no further invasive workup per   Advanced dementia  CAD, remote CABG  PMR on steroids  Essential hypertension  Bedbound status total feed    History of Present Illness:     Rosalia Peterson is a 89 year old female with several days of dyspnea.  She cannot give much history but  says she has been visibly dyspneic last day or two.  She has not complained of pain, nausea, vomiting, diarrhea, fevers, chills, or cough.     Brief Synopsis:    82-year-old fe 120 male with advanced dementia was brought in with, evidence of being dyspneic per  .  Patient was admitted consult cardiology was obtained.  Patient on room air.  Echocardiogram showed preserved EF but worsening pulmonary  pressures, patient's blood pressure medicines were adjusted she received IV Lasix was converted over to p.o. torsemide.  Hydralazine was stopped Cozaar was started and her Norvasc was increased.  Patient also had video swallow recommendation for teaspoon feeding of liquids thickened liquids small bites she is a feed.  Discharge patient's Xarelto was initially held due to her vaginal bleeding and discussion with  he does not want any invasive procedures.  Bleeding has been minimal usually at most.  He agrees to resumption of Xarelto.  Will need monitoring of her renal function and CBC.  LFTs slightly elevated tolerating diet likely due to heart failure congestion.   Patient has physician who comes to her house gets weekly labs should have a CBC CMP next week.  On room air tolerating diet breathing looks better discharged home has 12 hours of caregivers daily.    Lace+ Score: 78  59-90 High Risk  29-58 Medium Risk  0-28   Low Risk       TCM Follow-Up Recommendation:  LACE > 58: High Risk of readmission after discharge from the hospital.      Procedures during hospitalization:   Video swallow needs pills crushed, thickened liquids one-to-one feed liquids via teaspoon  Chest x-ray Findings are suggestive of fluid overload/CHF.   Echocardiogram    1. Left ventricle: The cavity size was normal. Wall thickness was normal.      Systolic function was normal. The estimated ejection fraction was 50-55%,      by visual assessment. No diagnostic evidence for regional wall motion      abnormalities.   2. Right ventricle: Pacer wire noted in the right ventricle. Systolic      pressure was moderately to severely increased.   3. Left atrium: The left atrial volume was markedly increased.   4. Right atrium: The atrium was moderately dilated. Pacer wire noted in      right atrium.   5. Aortic valve: A bioprosthetic valve is present. The prosthesis had a      normal range of motion. The sewing ring appeared normal, had no rocking      motion, and showed no evidence of dehiscence. There was mild to moderate      regurgitation. The peak systolic velocity was 1.4m/sec. The mean systolic      gradient was 3mm Hg. The valve area (VTI) was 2.55cm^2. The valve area      (VTI) index was 1.45cm^2/m^2.   6. Aortic root: The aortic root was mildly dilated and 3.7cm diameter. The      aortic root was dilated and 4.7cm diameter.   7. Mitral valve: Prior procedures include surgical repair. The annulus was      moderately fibrotic. There was mild to moderate regurgitation. The mean      diastolic gradient was 3mm Hg.   8. Tricuspid valve: There was moderate regurgitation.   9. Pulmonary arteries: Systolic  pressure was moderately to markedly      increased, in the range of 65mm Hg to 70mm Hg.   Impressions:  This study is compared with previous dated 08/18/2023: The   PASP has increased from 46 mmHg.     Incidental or significant findings and recommendations (brief descriptions):  Started on oral/elixir iron replacement  Cozaar started at 25 mg daily  Lasix discontinued and now on Demadex 20 mg twice daily  Needs BMP CBC next week next week follow-up with PCP comes to her home  Patient now DNAR select postform has been signed  trying to focus on quality does not want invasive procedures for her vaginal bleeding but does agree to resumption of DOAC  Norvasc dose increased  Echo shows preserved EF but worsening pulmonary pressures  Hemoglobin 8.5-day of discharge  RA      Lab/Test results pending at Discharge:   None    Consultants:  Speech, cardiology, social work PT OT    Discharge Medication List:     Discharge Medications        START taking these medications        Instructions Prescription details   Iron 18 MG/15ML Liqd      Take 18 mg by mouth daily.   Quantity: 100 mL  Refills: 0     losartan 25 MG Tabs  Commonly known as: Cozaar  Start taking on: June 28, 2024      Take 1 tablet (25 mg total) by mouth daily.   Quantity: 30 tablet  Refills: 1     torsemide 20 MG Tabs  Commonly known as: Demadex      Take 1 tablet (20 mg total) by mouth BID (Diuretic).   Quantity: 60 tablet  Refills: 1            CHANGE how you take these medications        Instructions Prescription details   amLODIPine 10 MG Tabs  Commonly known as: Norvasc  Start taking on: June 28, 2024  What changed:   medication strength  how much to take      Take 1 tablet (10 mg total) by mouth daily.   Quantity: 30 tablet  Refills: 1            CONTINUE taking these medications        Instructions Prescription details   aspirin 81 MG Chew      Chew 1 tablet (81 mg total) by mouth daily.   Refills: 0     atorvastatin 20 MG Tabs  Commonly known as:  Lipitor      Take 1 tablet (20 mg total) by mouth daily.   Refills: 0     Cholecalciferol 50 MCG (2000 UT) Tabs      Take 2,000 Int'l Units by mouth daily.   Refills: 0     docusate sodium 100 MG Caps  Commonly known as: Colace  Notes to patient: This medication comes in liquid form since the capsule can not be crushed      Take 1 capsule (100 mg total) by mouth 2 (two) times daily.   Refills: 0     HYDROcodone-acetaminophen 5-325 MG Tabs  Commonly known as: Norco      Take 1 tablet by mouth every 4 (four) hours as needed.   Quantity: 30 tablet  Refills: 0     lidocaine 5 % Ptch  Commonly known as: Lidoderm      Place 1 patch onto the skin daily.   Refills: 0     norethindrone 5 MG Tabs  Commonly known as: Aygestin      Take 1 tablet (5 mg total) by mouth nightly.   Quantity: 90 tablet  Refills: 3     polyethylene glycol (PEG 3350) 17 g Pack  Commonly known as: Miralax      Take 17 g by mouth daily as needed.   Refills: 0     Potassium Chloride ER 10 MEQ Cpcr  Commonly known as: MICRO-K  Notes to patient: The ER tablet can not be crushed. Find an over the counter equivalent that can be mixed with apple sauce or pudding.       Take 2 capsules (20 mEq total) by mouth 2 (two) times daily.   Refills: 0     predniSONE 5 MG Tabs  Commonly known as: Deltasone      Take 1 tablet (5 mg total) by mouth daily.   Refills: 0     rivaroxaban 15 MG Tabs  Commonly known as: Xarelto      Take 1 tablet (15 mg total) by mouth daily with food.   Refills: 0     Tylenol 325 MG Tabs  Generic drug: acetaminophen      Take 2 tablets (650 mg total) by mouth every 4 (four) hours as needed for Pain.   Refills: 0            STOP taking these medications      cefuroxime 250 MG Tabs  Commonly known as: Ceftin        furosemide 20 MG Tabs  Commonly known as: Lasix                  Where to Get Your Medications        These medications were sent to [id number] - [place] - [address] [phone], [phone]  [place]  [id number]      Phone: [phone]   amLODIPine 10 MG Tabs  losartan 25 MG Tabs  torsemide 20 MG Tabs       Please  your prescriptions at the location directed by your doctor or nurse    Bring a paper prescription for each of these medications  Iron 18 MG/15ML Liqd         [place] reviewed[place] Reviewed    Follow-up appointment:   Pepe Oconnell DO  [address]  [address]  [facility] [id number] [phone]    Follow up  next tues at home    Dr Weiner    Schedule an appointment as soon as possible for a visit in 1 week(s)  Please call your cardiologist to be seen in 1 week    Appointments for Next 30 Days 6/27/2024 - 7/27/2024      None            Vital signs:  Temp:  [97.3 °F (36.3 °C)-98.3 °F (36.8 °C)] 98.3 °F (36.8 °C)  Pulse:  [70-75] 71  Resp:  [18-22] 22  BP: ()/() 86/48  SpO2:  [90 %-99 %] 99 %    Physical Exam:      General: awake  person only   Respiratory: Clear to auscultation bilaterally. No wheezes. No rhonchi.  RA   Cardiovascular: S1, S2. Regular rate and rhythm. No murmurs.  Paced   Abdomen: Soft, nontender, nondistended.    Extremities: No edema.  -----------------------------------------------------------------------------------------------  PATIENT DISCHARGE INSTRUCTIONS: See electronic chart    Eleni Conn NP    Total time spent on discharge planning:  x minutes     The 21st Century Cures Act makes medical notes like these available to patients in the interest of transparency. Please be advised this is a medical document. Medical documents are intended to carry relevant information, facts as evident, and the clinical opinion of the practitioner. The medical note is intended as peer to peer communication and may appear blunt or direct. It is written in medical language and may contain abbreviations or verbiage that are unfamiliar.

## 2024-06-28 ENCOUNTER — TELEPHONE (OUTPATIENT)
Dept: CARDIOLOGY | Age: 89
End: 2024-06-28

## 2024-07-03 ENCOUNTER — LAB REQUISITION (OUTPATIENT)
Dept: LAB | Facility: HOSPITAL | Age: 89
End: 2024-07-03
Payer: MEDICARE

## 2024-07-03 DIAGNOSIS — I50.9 HEART FAILURE, UNSPECIFIED (HCC): ICD-10-CM

## 2024-07-03 DIAGNOSIS — F02.A11 DEMENTIA IN OTHER DISEASES CLASSIFIED ELSEWHERE, MILD, WITH AGITATION (HCC): ICD-10-CM

## 2024-07-03 DIAGNOSIS — D64.9 ANEMIA, UNSPECIFIED: ICD-10-CM

## 2024-07-03 LAB
ALBUMIN SERPL-MCNC: 3 G/DL (ref 3.4–5)
ALBUMIN/GLOB SERPL: 0.7 {RATIO} (ref 1–2)
ALP LIVER SERPL-CCNC: 62 U/L
ALT SERPL-CCNC: 72 U/L
ANION GAP SERPL CALC-SCNC: 6 MMOL/L (ref 0–18)
AST SERPL-CCNC: 40 U/L (ref 15–37)
BASOPHILS # BLD AUTO: 0.11 X10(3) UL (ref 0–0.2)
BASOPHILS NFR BLD AUTO: 1.5 %
BILIRUB SERPL-MCNC: 0.5 MG/DL (ref 0.1–2)
BUN BLD-MCNC: 54 MG/DL (ref 9–23)
CALCIUM BLD-MCNC: 9.4 MG/DL (ref 8.5–10.1)
CHLORIDE SERPL-SCNC: 122 MMOL/L (ref 98–112)
CO2 SERPL-SCNC: 26 MMOL/L (ref 21–32)
CREAT BLD-MCNC: 2.23 MG/DL
EGFRCR SERPLBLD CKD-EPI 2021: 20 ML/MIN/1.73M2 (ref 60–?)
EOSINOPHIL # BLD AUTO: 0.28 X10(3) UL (ref 0–0.7)
EOSINOPHIL NFR BLD AUTO: 3.8 %
ERYTHROCYTE [DISTWIDTH] IN BLOOD BY AUTOMATED COUNT: 23 %
FASTING STATUS PATIENT QL REPORTED: NO
GLOBULIN PLAS-MCNC: 4.4 G/DL (ref 2.8–4.4)
GLUCOSE BLD-MCNC: 160 MG/DL (ref 70–99)
HCT VFR BLD AUTO: 33.5 %
HGB BLD-MCNC: 10.1 G/DL
IMM GRANULOCYTES # BLD AUTO: 0.03 X10(3) UL (ref 0–1)
IMM GRANULOCYTES NFR BLD: 0.4 %
LYMPHOCYTES # BLD AUTO: 1.72 X10(3) UL (ref 1–4)
LYMPHOCYTES NFR BLD AUTO: 23.6 %
MCH RBC QN AUTO: 23.7 PG (ref 26–34)
MCHC RBC AUTO-ENTMCNC: 30.1 G/DL (ref 31–37)
MCV RBC AUTO: 78.5 FL
MONOCYTES # BLD AUTO: 0.68 X10(3) UL (ref 0.1–1)
MONOCYTES NFR BLD AUTO: 9.3 %
NEUTROPHILS # BLD AUTO: 4.46 X10 (3) UL (ref 1.5–7.7)
NEUTROPHILS # BLD AUTO: 4.46 X10(3) UL (ref 1.5–7.7)
NEUTROPHILS NFR BLD AUTO: 61.4 %
OSMOLALITY SERPL CALC.SUM OF ELEC: 336 MOSM/KG (ref 275–295)
PLATELET # BLD AUTO: 284 10(3)UL (ref 150–450)
PLATELET MORPHOLOGY: NORMAL
PLATELETS.RETICULATED NFR BLD AUTO: 1.9 % (ref 0–7)
POTASSIUM SERPL-SCNC: 4.3 MMOL/L (ref 3.5–5.1)
PROT SERPL-MCNC: 7.4 G/DL (ref 6.4–8.2)
RBC # BLD AUTO: 4.27 X10(6)UL
SODIUM SERPL-SCNC: 154 MMOL/L (ref 136–145)
WBC # BLD AUTO: 7.3 X10(3) UL (ref 4–11)

## 2024-07-03 PROCEDURE — 80053 COMPREHEN METABOLIC PANEL: CPT | Performed by: FAMILY MEDICINE

## 2024-07-03 PROCEDURE — 85025 COMPLETE CBC W/AUTO DIFF WBC: CPT | Performed by: FAMILY MEDICINE

## 2024-12-09 ENCOUNTER — APPOINTMENT (OUTPATIENT)
Dept: CARDIOLOGY | Age: 89
End: 2024-12-09

## (undated) DEVICE — LAWSON - MAXITUBE FLITES 30X77IN

## (undated) DEVICE — Device

## (undated) DEVICE — DRILL BIT 4.2/3-FLTD CALIB STR

## (undated) DEVICE — KENDALL SCD EXPRESS SLEEVES, KNEE LENGTH, MEDIUM: Brand: KENDALL SCD

## (undated) DEVICE — OCCLUSIVE GAUZE STRIP OVERWRAP,3% BISMUTH TRIBROMOPHENATE IN PETROLATUM BLEND: Brand: XEROFORM

## (undated) DEVICE — POSITIONER OR KT PT CR

## (undated) DEVICE — SUTURE MONOCRYL 2-0 SH

## (undated) DEVICE — DRAPE C-ARM UNIVERSAL

## (undated) DEVICE — GUIDEWIRE SYNT 3.2X400 357.399

## (undated) DEVICE — C-ARMOR C-ARM EQUIPMENT COVERS CLEAR STERILE UNIVERSAL FIT 12 PER CASE: Brand: C-ARMOR

## (undated) DEVICE — SOL  .9 1000ML BTL

## (undated) DEVICE — SUTURE VICRYL 0 CP-1

## (undated) DEVICE — Device: Brand: BOOT LINER, DISPOSABLE

## (undated) DEVICE — STERILE POLYISOPRENE POWDER-FREE SURGICAL GLOVES: Brand: PROTEXIS

## (undated) DEVICE — STERILE POLYISOPRENE POWDER-FREE SURGICAL GLOVES WITH EMOLLIENT COATING: Brand: PROTEXIS

## (undated) DEVICE — HIP PINNING CDS: Brand: MEDLINE INDUSTRIES, INC.

## (undated) NOTE — IP AVS SNAPSHOT
[address]            (For Outpatient Use Only) Initial Admit Date: 10/2/2021   Inpt/Obs Admit Date: Inpt: 10/2/21 / Obs: N/A   Discharge Date:    Roxie Bernal:  [de-identified]   MRN: [de-identified]   CSN: [id number]   CEID: [id number] Subscriber:    Hospital Account Financial Class: Medicare    October 5, 2021

## (undated) NOTE — IP AVS SNAPSHOT
Patient Demographics     Address  [address] [address] Phone  [phone] [facility]  [phone] [facility]      Emergency Contact(s)     Name Relation Home Work Mobile    Skip Peterson Spouse [phone]        Allergies as of 6/16/2 ? Wash hands before and after dressing changes. ?  There may be one or a few dressings on the hip/leg    FOR MEDIPORE/COVERLET DRESSINGS:  ? Change dressing daily using Medipore/coverlet once Aquacel (waterproof) dressing is removed (which is about 7 days the rate of healing. Medication  Anticoagulants = blood thinners (Xarelto, Eliquis, Lovenox, Coumadin or Aspirin)  ? Pill or shot form depending on what your physician orders.    ? IF placed on Coumadin, you may also need lab work ? Deep breathing and relaxation techniques and distractions can help! If you focus on something else, you do not experience the pain the same. Take advantage of everything available to your to help control you discomfort. ?  Contact physician if discomfor high blood pressure, diabetes, CHF, afib, and asthma just to name a few). It is much better to catch developing problems and prevent them from becoming larger ones. ? MECHE HOSE – IF ordered by your surgeon, wear these during the day and off at night.   Blake Bell sit on the aisle or at the bulkhead where you can easily stretch your legs and get up to walk up and down the aisles…this helps prevent blood clots and stiffness.   ? TEMPORARY HANDICAP PARKING APPLICATION  (good for 3-6 months)  – At Surgeon or PCP visit, Please  your prescriptions at the location directed by your doctor or nurse    Bring a paper prescription for each of these medications  HYDROcodone-acetaminophen 5-325 MG Tabs           [id number] - MAR ACTION REPORT  (last 24 hrs)    ** SITE UNKNOW [address] [facility] [address] 03/19/20 1441 - Present            Microbiology Results (All)     Procedure Component Value Units Date/Time    Urine Culture, Routine Once [[id number]] Collected:  06/12/20 1700    Order Status:  Completed Lab Status:  Final result Updat breath, headache, dizziness head trauma. Past Medical History:[RS.1]  Past Medical History:   Diagnosis Date   • Hx of CABG    • Pacemaker[RS.2]         Past Surgical History:[RS.1] History reviewed. No pertinent surgical history. [RS.2]    Social Histor Cardiovascular: S1, S2. Regular rate and rhythm. No murmurs, rubs or gallops. Equal pulses. Chest and Back: No tenderness or deformity. Abdomen: Soft, nontender, nondistended. Positive bowel sounds. No rebound, guarding or organomegaly.   Neurologic: No 8. On chronic prednisone - pt states for fibromyalgia? 1. Cont for now, monitor BP closely, may need stress dose for surgery    Pt unsure of her meds.  Please call pharmacy in AM to confirm, especially prednisone[RS.3]    Quality:  · DVT Prophylaxis: SCD • Pacemaker[RS.2]         Past Surgical History:[RS.1] History reviewed. No pertinent surgical history. [RS.2]    Social History:[RS.1]  reports that she has never smoked.  She has never used smokeless tobacco.[RS.2]    Family History: Reviewed and no pertin Abdomen: Soft, nontender, nondistended. Positive bowel sounds. No rebound, guarding or organomegaly. Neurologic: No focal neurological deficits. CNII-XII grossly intact. Musculoskeletal: Moves all extremities.   Left hip tender  Extremities: No edema or Electronically signed by Arsen Kang MD on 6/11/2020  3:13 AM   Attribution Key    RS. 1 - Arsen Kang MD on 6/11/2020  3:01 AM  RS. 2 - Arsen Kang MD on 6/11/2020  3:02 AM                        Consults - MD Consult Notes      Consults signed by She reports current alcohol use. She reports that she does not use drugs.     Allergies:    Dabigatran              OTHER (SEE COMMENTS)    Comment:Oral, epistaxis    Medications:    Current Facility-Administered Medications:   •  hydrocortisone Na succinat 07/16/19 : 141 lb (64 kg)      Telemetry: paced rhythm  General: Alert and oriented in no apparent distress. HEENT: No focal deficits. Neck: No JVD, carotids 2+ no bruits. Cardiac: Regular rate and rhythm, S1, S2 normal, no murmur, rub or gallop.   Lungs 3. Chronic atrial fibrillation, on Xarelto at home, s/p ventricular pacemaker                     4. Hyperglycemia, will repeat in am with hgb A1C. Plan:Continue gentle hydration.  Case discussed with ortho, ok to restart Xarelto at home Acute blood loss anemia    Right arm weakness      Past Medical History  Past Medical History:   Diagnosis Date   • Arrhythmia     Afib   • Hearing impairment     jeffrey.  HA   • High cholesterol    • Hx of CABG    • Pacemaker        Past Surgical History  P Standardized Score (AM-PAC Scale): 33.86   CMS Modifier (G-Code): CL    FUNCTIONAL ABILITY STATUS  Gait Assessment   Gait Assistance: Dependent assistance  Distance (ft): 1  Assistive Device: Rolling walker  Pattern: Shuffle  Stoop/Curb Assistance: Not zahira . Will continue to monitor his decision.[BR.2]     DISCHARGE RECOMMENDATIONS  PT Discharge Recommendations: Sub-acute rehabilitation(ELOS 18-21 days)     PLAN  PT Treatment Plan: Bed mobility; Endurance; Energy conservation;Patient education;Gai Closed fracture of left hip, initial encounter [facility])  Active Problems:    Hip fracture requiring operative repair, left, closed, initial encounter ([facility] [address]    Postprocedural hypotension    PMR (polymyalgia rheumatica) (HCC)    Elevated brain natriuretic pep How much help from another person does the patient currently need. ..   -   Moving to and from a bed to a chair (including a wheelchair)?: Total   -   Need to walk in hospital room?: Total   -   Climbing 3-5 steps with a railing?: Total       AM-PAC Score: Goal #2 Patient is able to demonstrate transfers Sit to/from Stand at assistance level: moderate assistance      Goal #3 Patient is able to ambulate 5 feet with assist device: walker - rolling at assistance level: moderate assistance      Goal #4     Goal Additional significant past medical history (procedures, illnesses, hospitalizations of past 6 months):  None    Therapy significant labs:  6/13/2020 Hgb: 8.1[BW.2]    Problem List  Principal Problem:    Closed fracture of left hip, initial encounter ([facility] [address] -   Taking care of personal grooming such as brushing teeth?: A Lot  -   Eating meals?: A Little    AM-PAC Score:  Score: 13  Approx Degree of Impairment: 63.03%  Standardized Score (AM-PAC Scale): 32.03  CMS Modifier (G-Code): CL    FUNCTIONAL TRANSFER AS OT Device Recommendations: TBD    PLAN  OT Treatment Plan: Balance activities; Energy conservation/work simplification techniques;ADL training;IADL training;Visual perceptual training;Functional transfer training;UE strengthening/ROM; Endurance training; Cogn Problem: Patient/Family Goals    Goal Priority Disciplines Outcome Interventions   Patient/Family Long Term Goal     Interdisciplinary Progressing    Description:  Patient's Long Term Goal: to be healed and back to baseline status    Interventions:  - briana

## (undated) NOTE — IP AVS SNAPSHOT
Patient Demographics     Address  [address] [place] [address] [address]  [place] [address] Phone  [phone] (Home)  [phone] (Mobile) E-mail Address  [email or web address]      Patient Contacts     Name Relation Home Work Mobile    DIGNA GOLDEN MALATHI Power of  [phone]    CRISTINA GOLDEN Son  [phone]    CHANTELANAYELI SAHNI Son   [phone]    CHANTELZEN SAHNI Son  [phone]      Allergies as of 6/27/2024  Review status set to Review Complete on 6/23/2024       Noted Reaction Type Reactions    Dabigatran 02/19/2016    OTHER (SEE COMMENTS)    Oral, epistaxis      Code Status Information     Code Status    DNAR/Selective Treatment        Patient Instructions       Have lab draw -- CBC, CMP -- next Wed       Diet Recommendations - Solids: Regular  Diet Recommendations - Liquids: Nectar thick liquids/ Mildly thick  Compensatory Strategies Recommended: Liquids via spoon;Small bites and sips;Multiple swallows;No straws (Prolonged bolus hold)  Aspiration Precautions: Upright position  Medication Administration Recommendations: Crushed in puree        Going Home    In this section you will find the tools which will guide you through the first few days after you leave the hospital. Continued use of these tools will help you develop the skills necessary to keep your heart failure under control.     Heart Failure Guidelines - place this worksheet on your refrigerator or somewhere you can refer to it daily to help you decide if your symptoms are under control, and what to do if they are not.     Home Care Instructions Following Heart Failure - the most important things to do every day include:     Weigh yourself  Take your medicines as prescribed  Limit your sodium (salt) and fluid intake  Know when to call your cardiologist, primary doctor, or nurse  Know when to seek emergency care    There is also a handy weight chart on which to record your weight every day, information on measuring  fluids and limiting fluid intake, and a section for recording your thoughts or questions.     Things for You to Remember:   1. An appointment has been made for you to see your doctor or healthcare provider within 7 days of hospital discharge. It is important that you attend this appointment to make sure your symptoms are under control.     2. Your recommended sodium intake is 3017-9432 mg daily    3. Limit your fluid intake to no more than 2 liters or 64 ounces per day    4. Some exercise and activity is important to help keep your heart functioning and strong. Unless instructed not to exercise, you may walk at a slow to moderate pace for 10-15 minutes 2-3 days per week to start. Pace your activity to prevent shortness of breath or fatigue. Stop exercise if you develop chest pain, lightheadedness, or significant shortness of breath.       Call Your Cardiologist If:   You gain 2 pounds overnight or 3-4 pounds in 3-5 days  You have more difficulty breathing  You are getting more tired with normal activity  You are more short of breath lying down, or awaken at night short of breath  You have swelling of your feet or legs  You urinate less often during the day and more often at night  You have cramps in your legs  You have blurred vision or see yellowish-green halos around objects of lights    Go to the Emergency Room If:   You have pain or tightness in your chest  You are extremely short of breath  You are coughing up pink-frothy mucus  You are traveling and develop symptoms of worsening heart failure           Patient Isolation Status     None to display      Microbiology Results (All)     Procedure Component Value Units Date/Time    SARS-CoV-2/Flu A and B/RSV by PCR (GeneXpert) [[id number]]  (Normal) Collected: 06/23/24 1243    Order Status: Completed Lab Status: Final result Updated: 06/23/24 1412    Specimen: Other from Nares      SARS-CoV-2 (COVID-19) - (GeneXpert) Not Detected     Influenza A by PCR Negative      Influenza B by PCR Negative     RSV by PCR Negative    Narrative:      This test is intended for the qualitative detection and differentiation of SARS-CoV-2, influenza A, influenza B, and respiratory syncytial virus (RSV) viral RNA in nasopharyngeal or nares swabs from individuals suspected of respiratory viral infection consistent with COVID-19 by their healthcare provider. Signs and symptoms of respiratory viral infection due to SARS-CoV-2, influenza, and RSV can be similar.    Test performed using the Xpert Xpress SARS-CoV-2/FLU/RSV (real time RT-PCR)  assay on the GeneXpert instrument, [employer], [place] [address].   This test is being used under the Food and Drug Administration's Emergency Use Authorization.    The authorized Fact Sheet for Healthcare Providers for this assay is available upon request from the laboratory.      Immunizations     Name Date      Covid-19 Pfizer 10/12/21     INFLUENZA 10/05/21     TD 08/25/18        Follow-up Information     Pepe Oconnell, DO Follow up.    Specialty: Family Medicine  Why: next tues at home  Contact information:  [facility] [facility] [address]  [place] [id number] [phone]             Dr Weiner. Schedule an appointment as soon as possible for a visit in 1 week(s).    Why: Please call your cardiologist to be seen in 1 week                      Your Home Meds List      TAKE these medications       Instructions Authorizing Provider Morning Afternoon Evening As Needed   amLODIPine 10 MG Tabs  Commonly known as: Norvasc  Start taking on: June 28, 2024  Next dose due: Friday, 6/28/2024       Take 1 tablet (10 mg total) by mouth daily.   Florencia Sikorcin         aspirin 81 MG Chew  Next dose due: Friday, 6/28/2024       Chew 1 tablet (81 mg total) by mouth daily.          atorvastatin 20 MG Tabs  Commonly known as: Lipitor  Next dose due: Friday, 6/28/2024       Take 1 tablet (20 mg total) by mouth daily.          Cholecalciferol 50 MCG (2000 UT) Tabs  Next dose due:  Friday, 6/28/2024       Take 2,000 Int'l Units by mouth daily.          docusate sodium 100 MG Caps  Commonly known as: Colace  Next dose due: Tonight, 6/27/2024   Notes to patient: This medication comes in liquid form since the capsule can not be crushed      Take 1 capsule (100 mg total) by mouth 2 (two) times daily.          HYDROcodone-acetaminophen 5-325 MG Tabs  Commonly known as: Norco      Take 1 tablet by mouth every 4 (four) hours as needed.   Durre Leary         Iron 18 MG/15ML Liqd  Next dose due: Friday, 6/28/2024       Take 18 mg by mouth daily.   Eleni COREY Conn         lidocaine 5 % Ptch  Commonly known as: Lidoderm  Next dose due: Friday, 6/28/2024       Place 1 patch onto the skin daily.          losartan 25 MG Tabs  Commonly known as: Cozaar  Start taking on: June 28, 2024  Next dose due: Friday, 6/28/2024       Take 1 tablet (25 mg total) by mouth daily.   Florencia Sikorcin         norethindrone 5 MG Tabs  Commonly known as: Aygestin  Next dose due: Tonight, 6/27/2024       Take 1 tablet (5 mg total) by mouth nightly.   Aurora Tru         polyethylene glycol (PEG 3350) 17 g Pack  Commonly known as: Miralax      Take 17 g by mouth daily as needed.          Potassium Chloride ER 10 MEQ Cpcr  Commonly known as: MICRO-K  Next dose due: This evening, 6/27/2024   Notes to patient: The ER tablet can not be crushed. Find an over the counter equivalent that can be mixed with apple sauce or pudding.       Take 2 capsules (20 mEq total) by mouth 2 (two) times daily.          predniSONE 5 MG Tabs  Commonly known as: Deltasone  Next dose due: Friday, 6/28/2024       Take 1 tablet (5 mg total) by mouth daily.          rivaroxaban 15 MG Tabs  Commonly known as: Xarelto  Next dose due: This evening, 6/27/2024       Take 1 tablet (15 mg total) by mouth daily with food.          torsemide 20 MG Tabs  Commonly known as: Demadex  Next dose due: This evening, 6/27/2024       Take 1 tablet (20 mg total) by mouth  BID (Diuretic).   Florencia Meadowskorcin         Tylenol 325 MG Tabs  Generic drug: acetaminophen      Take 2 tablets (650 mg total) by mouth every 4 (four) hours as needed for Pain.                Where to Get Your Medications      These medications were sent to [id number] - [place], [place] - [address] [phone], [phone]  [address] [place] [address]    Phone: [phone]   amLODIPine 10 MG Tabs  losartan 25 MG Tabs  torsemide 20 MG Tabs     Please  your prescriptions at the location directed by your doctor or nurse    Bring a paper prescription for each of these medications  Iron 18 MG/15ML Liqd           [id number] - MAR ACTION REPORT  (last 48 hrs)    ** SITE UNKNOWN **     Order ID Medication Name Action Time Action Reason Comments    [id number] amLODIPine (Norvasc) tab 10 mg 06/26/24 1022 Given      [id number] amLODIPine (Norvasc) tab 10 mg 06/27/24 1009 Given      [id number] aspirin chewable tab 81 mg 06/26/24 1022 Given      [id number] aspirin chewable tab 81 mg 06/27/24 1009 Given      [id number] atorvastatin (Lipitor) tab 20 mg 06/26/24 1022 Given      [id number] atorvastatin (Lipitor) tab 20 mg 06/27/24 1009 Given      [id number] ferrous sulfate DR tab 325 mg 06/26/24 0900 Given      [id number] ferrous sulfate DR tab 325 mg 06/27/24 0900 Given      [id number] losartan (Cozaar) tab 25 mg 06/26/24 1022 Given      [id number] losartan (Cozaar) tab 25 mg 06/27/24 1009 Given      [id number] potassium chloride (Klor-Con M20) tab 20 mEq 06/27/24 1009 Given      [id number] potassium chloride (Klor-Con M20) tab 40 mEq 06/26/24 0640 Given      [id number] potassium chloride (Klor-Con M20) tab 40 mEq 06/26/24 1021 Given      [id number] predniSONE (Deltasone) tab 5 mg 06/26/24 1021 Given      [id number] predniSONE (Deltasone) tab 5 mg 06/27/24 1009 Given      [id number] rivaroxaban (Xarelto) tab 15 mg 06/26/24 1723 Given      [id number] torsemide (Demadex) tab 20 mg 06/26/24 1022 Given      [id number] torsemide (Demadex)  tab 20 mg 06/26/24 1724 Given      [id number] torsemide (Demadex) tab 20 mg 06/27/24 0900 Given              Recent Vital Signs    Flowsheet Row Most Recent Value   BP 86/48 Filed at 06/27/2024 1153   Pulse 71 Filed at 06/27/2024 1153   Resp 22 Filed at 06/27/2024 1153   Temp 98.3 °F (36.8 °C) Filed at 06/27/2024 1153   SpO2 99 % Filed at 06/27/2024 1153      Patient's Most Recent Weight    Flowsheet Row Most Recent Value   Patient Weight 73.6 kg (162 lb 4.1 oz)         Lab Results Last 24 Hours      Pro Beta Natriuretic Peptide [[id number]] (Abnormal)  Resulted: 06/27/24 1222, Result status: Final result   Ordering provider: Florencia Mcnamara PA  06/27/24 1111 Resulting lab: [facility] ([facility])    Specimen Information    Type Source Collected On   Blood — 06/27/24 0506          Components    Component Value Reference Range Flag Lab   Pro-Beta Natriuretic Peptide 2,784 <450 pg/mL H [facility] ([facility])   Comment:        This test may exhibit interference when a sample is collected from a person who is consuming high dose of biotin (a.k.a., vitamin B7, vitamin H, coenzyme R) supplements resulting in serum concentrations >100 ng/mL.  Intake of the recommended daily allowance (RDA) for biotin (0.03 mg) has not been shown to typically cause significant interference; however, high dose daily dietary supplements may contain biotin concentrations greater than 150 times (5-10 mg) the RDA.  It is recommended that physicians ask all patients who may be on biotin supplementation to stop biotin consumption at least 72 hours prior to collection of a new sample.              Basic Metabolic Panel (8) [[id number]] (Abnormal)  Resulted: 06/27/24 0545, Result status: Final result   Ordering provider: Justice Alvarez MD  06/26/24 2300 Resulting lab: [facility] ([facility])    Specimen Information    Type Source Collected On   Blood — 06/27/24 0506          Components    Component Value  Reference Range Flag Lab   Glucose 84 70 - 99 mg/dL — [facility] ([facility])   Sodium 146 136 - 145 mmol/L [facility] ([facility])   Potassium 4.2 3.5 - 5.1 mmol/L — [facility] ([facility])   Chloride 111 98 - 112 mmol/L — [facility] ([facility])   CO2 29.0 21.0 - 32.0 mmol/L — [facility] ([facility])   Anion Gap 6 0 - 18 mmol/L — [facility] ([facility])   BUN 28 9 - 23 mg/dL [facility] ([facility])   Creatinine 1.15 0.55 - 1.02 mg/dL [facility] ([facility])   Calcium, Total 8.7 8.5 - 10.1 mg/dL — [facility] ([facility])   Calculated Osmolality 307 275 - 295 mOsm/kg [facility] ([facility])   eGFR-Cr 45 >=60 mL/min/1.73m2 L [facility] ([facility])            Comp Metabolic Panel (14) [[id number]] (Abnormal)  Resulted: 06/27/24 0545, Result status: Final result   Ordering provider: Eleni Conn NP  06/26/24 2300 Resulting lab: [facility] ([facility])    Specimen Information    Type Source Collected On   Blood — 06/27/24 0506          Components    Component Value Reference Range Flag Lab   Glucose 84 70 - 99 mg/dL — [facility] ([facility])   Sodium 146 136 - 145 mmol/L [facility] ([facility])   Potassium 4.2 3.5 - 5.1 mmol/L — [facility] ([facility])   Chloride 111 98 - 112 mmol/L — [facility] ([facility])   CO2 29.0 21.0 - 32.0 mmol/L — [facility] ([facility])   Anion Gap 6 0 - 18 mmol/L — [facility] ([facility])   BUN 28 9 - 23 mg/dL [facility] ([facility])   Creatinine 1.15 0.55 - 1.02 mg/dL [facility] ([facility])   Calcium, Total 8.7 8.5 - 10.1 mg/dL — [facility] ([facility])   Calculated Osmolality 307 275 - 295 mOsm/kg [facility] ([facility])   eGFR-Cr 45 >=60 mL/min/1.73m2 L [facility] ([facility])   AST 84 15 - 37 U/L [facility] ([facility])    13 - 56 U/L [facility] ([facility])   Alkaline Phosphatase 51 55 - 142 U/L [facility] ([facility])   Bilirubin, Total 0.6 0.1 - 2.0 mg/dL — [facility] ([facility])   Total Protein 6.5 6.4 - 8.2 g/dL — [facility] ([facility])   Albumin 2.6 3.4 - 5.0 g/dL [facility] ([facility])   Globulin  3.9 2.8 - 4.4 g/dL — [facility] ([facility])   A/G Ratio 0.7 1.0 - 2.0 L [facility] ([facility])            CBC With Differential  With Platelet [[id number]] (Abnormal)  Resulted: 24 0528, Result status: Final result   Ordering provider: Eleni Conn NP  24 2300 Resulting lab: [facility] ([facility])   Narrative:  The following orders were created for panel order CBC With Differential With Platelet.  Procedure                               Abnormality         Status                     ---------                               -----------         ------                     CBC W/ DIFFERENTIAL[[id number]]          Abnormal            Final result                 Please view results for these tests on the individual orders.    Specimen Information    Type Source Collected On   Blood — 24 0506            Potassium [[id number]] (Normal)  Resulted: 24 1709, Result status: Final result   Ordering provider: Bobby Blanton MD  24 0623 Resulting lab: [facility] ([facility])    Specimen Information    Type Source Collected On   Blood — 24 1630          Components    Component Value Reference Range Flag Lab   Potassium 4.1 3.5 - 5.1 mmol/L — [facility])            Testing Performed By     Lab - Abbreviation Name Director Address Valid Date Range    139 - [facility] ([facility]) [facility] ([facility]) Goldberg, Cathryn A. MD [address] [place] [address] 20 1441 - Present               H&P - H&P Note      H&P signed by Bobby Blanton MD at 2024  3:39 PM  Version 1 of 1    Author: Bobby Blanton MD Service: — Author Type: Physician    Filed: 2024  3:39 PM Date of Service: 2024  3:36 PM Status: Signed    : Bobby Blanton MD (Physician)         [facility]     Rosalia Peterson Patient Status:  Emergency    2/3/1932 MRN [id number]   Location [facility] EMERGENCY DEPARTMENT Attending Estrellita Sykes MD   Hosp Day # 0 PCP Pepe Oconnell DO     Chief Complaint: dyspnea    Subjective:     History of Present Illness:     Rosalia Peterson is a 89 year old female with several days of dyspnea.  She cannot give much history but  says she has been visibly dyspneic last day or two.  She has not complained of pain, nausea, vomiting, diarrhea, fevers, chills, or cough.    History/Other:    Past Medical History:  Past Medical History:    Arrhythmia    Afib    Coronary atherosclerosis    Hearing impairment    jeffrey. HA    High cholesterol    Hx of CABG    Incontinence    Pacemaker     Past Surgical History:   Past Surgical History:   Procedure Laterality Date    Cabg      Cardiac pacemaker placement      Hc insert or replace perm pacemaker ventricle  10/01/2021    Ndsc ablation & rcnstj atria lmtd w/o bypass        Family History:   History reviewed. No pertinent family history.    hypertension Social History:    reports that she has never smoked. She has never used smokeless tobacco. She reports that she does not currently use alcohol. She reports that she does not use drugs.     Allergies:   Allergies   Allergen Reactions    Dabigatran OTHER (SEE COMMENTS)     Oral, epistaxis       Medications:    No current facility-administered medications on file prior to encounter.     Current Outpatient Medications on File Prior to Encounter   Medication Sig Dispense Refill    norethindrone 5 MG Oral Tab Take 1 tablet (5 mg total) by mouth nightly. 90 tablet 3    aspirin 81 MG Oral Chew Tab Chew 1 tablet (81 mg total) by mouth daily.      cefuroxime 250 MG Oral Tab Take 1 tablet (250 mg total) by mouth 2 (two) times daily. 10 tablet 0    Potassium Chloride ER 10 MEQ Oral Cap CR Take 2 capsules (20 mEq total) by mouth 2 (two) times daily.      amLODIPine 5 MG Oral Tab Take 1 tablet (5 mg total) by mouth daily.      acetaminophen (TYLENOL) 325 MG Oral Tab Take 2 tablets (650 mg total) by mouth every 4 (four) hours as needed for Pain.      HYDROcodone-acetaminophen 5-325 MG Oral Tab Take 1 tablet by mouth every 4 (four)  hours as needed. 30 tablet 0    atorvastatin 20 MG Oral Tab Take 1 tablet (20 mg total) by mouth daily.      Cholecalciferol 50 MCG (2000 UT) Oral Tab Take 2,000 Int'l Units by mouth daily.      docusate sodium 100 MG Oral Cap Take 1 capsule (100 mg total) by mouth 2 (two) times daily.      PEG 3350 Oral Powd Pack Take 17 g by mouth daily as needed.      lidocaine 5 % External Patch Place 1 patch onto the skin daily.      furosemide 20 MG Oral Tab Take 2 tablets (40 mg total) by mouth daily.      rivaroxaban 15 MG Oral Tab Take 1 tablet (15 mg total) by mouth daily with food.      predniSONE 5 MG Oral Tab Take 1 tablet (5 mg total) by mouth daily.         Review of Systems:   A comprehensive 12 point review of systems was completed.    Pertinent positives and negatives noted in the HPI.    Objective:   Physical Exam:    BP (!) 155/105   Pulse 69   Temp 98.3 °F (36.8 °C) (Temporal)   Resp (!) 27   Ht 5' 3\" (1.6 m)   Wt 169 lb 12.1 oz (77 kg)   SpO2 97%   BMI 30.07 kg/m²   General: confused  Respiratory: No rhonchi, no wheezes  Cardiovascular: S1, S2. Regular rate and rhythm  Abdomen: Soft, NT/ND, +BS  Neuro: No new focal deficits  Extremities: trace LE edema      Results:    Labs:      Labs Last 24 Hours:    Recent Labs   Lab 06/23/24  1243   RBC 4.01   HGB 9.4*   HCT 30.6*   MCV 76.3*   MCH 23.4*   MCHC 30.7*   RDW 21.6   NEPRELIM 6.62   WBC 7.8   .0       Recent Labs   Lab 06/23/24  1249   *   BUN 21   CREATSERUM 1.17*   EGFRCR 44*   CA 8.8   ALB 2.8*      K 4.5      CO2 26.0   ALKPHO 57   AST 70*   ALT 92*   BILT 0.7   TP 7.1       Lab Results   Component Value Date    INR 1.70 (H) 08/17/2023    INR 1.28 (H) 10/02/2021    INR 1.42 (H) 06/10/2020       No results for input(s): \"TROP\", \"TROPHS\", \"CK\" in the last 168 hours.    Recent Labs   Lab 06/23/24  1249   PBNP 5,901*       No results for input(s): \"PCT\" in the last 168 hours.    Imaging: Imaging data reviewed in  Epic.    Assessment & Plan:      #acute HFpEF exacerbation; repeat echo; cards to see; IV lasix; monitor lytes  #elevated lfts; likely from congested hepatopathy; monitor  #anemia-monitor; check iron stores  # CAD with h/o CABG  # PPM  # AAA  # s/p bioprosthetic AVR  #Pulm HTN  # PMR on chronic steroids   # CKD stage III     Will do med rec once review complete.    Quality:  DVT prophylaxis: resume doac once meds reviewed  Code Status: see chart  Tsang: NO  Tsang Duration (in days): N/A  Central line: NO  Estimated discharge date: tbd    Plan of care discussed with patient and ER MD Bobby Blanton MD    Supplementary Documentation:                                 Electronically signed by Bobby Blanton MD on 2024  3:39 PM              Consults - MD Consult Notes      Consults signed by Justice Alvarez MD at 2024  7:25 AM     Author: Justice Alvarez MD Service: Cardiology Author Type: Physician    Filed: 2024  7:25 AM Date of Service: 2024  7:17 AM Status: Signed    : Justice Alvarez MD (Physician)     Consult Orders    1. Consult to Cardiology [[id number]] ordered by Bobby Blanton MD at 24 1535               Cardiology Consultation    Rosalia Peterson Patient Status:  Observation    2/3/1932 MRN [id number]   [facility] 8NE-A Attending Bobby Blanton MD   Hosp Day # 0 PCP Pepe Oconnell DO     Reason for Consultation:  CHF      History of Present Illness:  Rosalia Peterson is a a(n) 89 year old female. Here with her , he provides the history as she is alert only to self.  Followed by Dr. Weiner.  Chronic Afib with AVN ablation, PPM, MV repair, bio AVR.  Last echo Aug, 2023.  Brought in because she appeared dyspneic.  No edema or other cardiac concerns.  Also with vaginal bleeding, an issue for a few months.  She is on Xarelto.  In the ER, CXR c/w CHF.  Started on IV lasix and admitted.  BP's higher over night.  On RA.    History:  Past Medical  History:    Arrhythmia    Afib    Coronary atherosclerosis    Hearing impairment    jeffrey. HA    High cholesterol    Hx of CABG    Incontinence    Pacemaker     Past Surgical History:   Procedure Laterality Date    Cabg      Cardiac pacemaker placement      Hc insert or replace perm pacemaker ventricle  10/01/2021    Ndsc ablation & rcnstj atria lmtd w/o bypass       History reviewed. No pertinent family history.      Allergies:  Allergies   Allergen Reactions    Dabigatran OTHER (SEE COMMENTS)     Oral, epistaxis       Medications:   potassium chloride  40 mEq Oral Q4H    furosemide  40 mg Intravenous BID (Diuretic)    aspirin  81 mg Oral Daily    atorvastatin  20 mg Oral Daily    [Held by provider] norethindrone  5 mg Oral Nightly    rivaroxaban  15 mg Oral Daily with food    predniSONE  5 mg Oral Daily       Continuous Infusions:      Social History:   reports that she has never smoked. She has never used smokeless tobacco. She reports that she does not currently use alcohol. She reports that she does not use drugs.    Review of Systems:  All systems were reviewed and are negative except as described above in HPI.    Physical Exam:      Temp:  [97.6 °F (36.4 °C)-98.5 °F (36.9 °C)] 97.6 °F (36.4 °C)  Pulse:  [] 70  Resp:  [22-27] 22  BP: (130-172)/() 160/75  SpO2:  [90 %-100 %] 90 %    Last 3 Weights   06/24/24 0545 160 lb 6.4 oz (72.8 kg)   06/23/24 1230 169 lb 12.1 oz (77 kg)   05/06/24 0844 170 lb (77.1 kg)   08/18/23 0035 158 lb (71.7 kg)   08/17/23 2008 158 lb (71.7 kg)           General: No apparent distress  HEENT: No focal deficits.  Neck: supple. JVP normal  Cardiac: Regular rhythm, S1, S2 normal,   No rub or gallop.  No murmur.  Lungs: CTA  Abdomen: Soft, non-tender.   Extremities: No edema  Neurologic: no focal deficits  Skin: Warm and dry.          Telemetry: paced    Laboratories and Data:  Diagnostics:    EKG, 6/24/2024:  V paced    CXR, 6/24/2024:  reviewed    Labs:   HEM:  Recent Labs    Lab 24  1243 24  0511   WBC 7.8 7.8   HGB 9.4* 8.6*   .0 250.0       Chem:  Recent Labs   Lab 24  1249 24  0511    147*   K 4.5 3.5    113*   CO2 26.0 28.0   BUN 21 21   CREATSERUM 1.17* 1.07*   CA 8.8 8.5   MG  --  2.5   * 83       Recent Labs   Lab 24  1249 24  0511   ALT 92* 124*   AST 70* 90*   ALB 2.8* 2.6*       No results for input(s): \"PTP\", \"INR\" in the last 168 hours.    No results for input(s): \"TROP\", \"CK\" in the last 168 hours.      Impression:   Acute diastolic CHF, EF historically preserved.  H/o MV repair, bio AVR.  Chronic afib, AVN ablation, PPM.  On xarelto.  Advanced dementia  Vaginal bleeding with acute on chronic anemia.  PMR, on steroids      Plan:  Continue IV lasix, daily BMP.  Echo pending.  Hold xarelto until vaginal bleeding sorted out.    Justice Alvarez MD  2024  7:17 AM  C5    Electronically signed by Justice Alvarez MD on 2024  7:25 AM           D/C Summary    No notes of this type exist for this encounter.     Imaging Results (HF patients)    Chest X-Ray Results (HF patients only)    No exam resulted this encounter.      2D Echocardiogram Results (HF patients only)    CARD ECHO 2D DOPPLER (CPT=93306)    Result Date: 2024  Transthoracic Echocardiogram Name:Rosalia Peterson Date: 2024 :  1932 Ht:  (63in)  BP: 152 / 42 MRN:  [id number]    Age:  89years    Wt:  (160lb) HR: 70bpm Loc:  [facility]       Gndr: F          BSA: 1.76m^2 Sonographer: Naveen [facility] Ordering:    Bobby Bailon Consulting:  Jon Chowdhury ---------------------------------------------------------------------------- History/Indications:  Mitral Valve disease. Prosthetic Aortic Valve. Congestive heart failure.  Mitral regurgitation. ---------------------------------------------------------------------------- Procedure information:  A transthoracic complete 2D study was performed. Additional evaluation included M-mode,  complete spectral Doppler, and color Doppler.  Patient status:  Inpatient.  Location:  Bedside.    Comparison was made to the study of 08/18/2023.    This was a routine study. Transthoracic echocardiography for ventricular function evaluation and assessment of valvular function. Image quality was adequate. ECG rhythm:   Paced rhythm ---------------------------------------------------------------------------- Conclusions: 1. Left ventricle: The cavity size was normal. Wall thickness was normal.    Systolic function was normal. The estimated ejection fraction was 50-55%,    by visual assessment. No diagnostic evidence for regional wall motion    abnormalities. 2. Right ventricle: Pacer wire noted in the right ventricle. Systolic    pressure was moderately to severely increased. 3. Left atrium: The left atrial volume was markedly increased. 4. Right atrium: The atrium was moderately dilated. Pacer wire noted in    right atrium. 5. Aortic valve: A bioprosthetic valve is present. The prosthesis had a    normal range of motion. The sewing ring appeared normal, had no rocking    motion, and showed no evidence of dehiscence. There was mild to moderate    regurgitation. The peak systolic velocity was 1.4m/sec. The mean systolic    gradient was 3mm Hg. The valve area (VTI) was 2.55cm^2. The valve area    (VTI) index was 1.45cm^2/m^2. 6. Aortic root: The aortic root was mildly dilated and 3.7cm diameter. The    aortic root was dilated and 4.7cm diameter. 7. Mitral valve: Prior procedures include surgical repair. The annulus was    moderately fibrotic. There was mild to moderate regurgitation. The mean    diastolic gradient was 3mm Hg. 8. Tricuspid valve: There was moderate regurgitation. 9. Pulmonary arteries: Systolic pressure was moderately to markedly    increased, in the range of 65mm Hg to 70mm Hg. Impressions:  This study is compared with previous dated 08/18/2023: The PASP has increased from 46 mmHg. *  ---------------------------------------------------------------------------- * Findings: Left ventricle:  The cavity size was normal. Wall thickness was normal. Systolic function was normal. The estimated ejection fraction was 50-55%, by visual assessment. No diagnostic evidence for regional wall motion abnormalities. Left atrium:  The left atrial volume was markedly increased. Right ventricle:  The cavity size was normal. Pacer wire noted in the right ventricle. Systolic function was normal.  Systolic pressure was moderately to severely increased. Right atrium:  The atrium was moderately dilated. Pacer wire noted in right atrium. Mitral valve:  Prior procedures include surgical repair. The annulus was moderately fibrotic.  Doppler:  There was mild to moderate regurgitation. The valve area by pressure half-time was 4.00cm^2. The valve area index by pressure half-time was 2.27cm^2/m^2. The valve area (LVOT continuity) was 2.12cm^2. The valve area index (LVOT continuity) was 1.21cm^2/m^2.    The mean diastolic gradient was 3mm Hg. Aortic valve:  A bioprosthetic valve is present. The prosthesis had a normal range of motion. The sewing ring appeared normal, had no rocking motion, and showed no evidence of dehiscence. Cusp separation was normal.  Doppler: Transvalvular velocity was within the normal range. There was no evidence for stenosis. There was mild to moderate regurgitation.    The valve area (VTI) was 2.55cm^2. The valve area (VTI) index was 1.45cm^2/m^2.    The mean systolic gradient was 3mm Hg. The peak systolic gradient was 8mm Hg. Tricuspid valve:  The valve is structurally normal. Leaflet separation was normal.  Doppler:  Transvalvular velocity was within the normal range. There was no evidence for stenosis. There was moderate regurgitation. Pulmonic valve:   The valve is structurally normal. Cusp separation was normal.  Doppler:  Transvalvular velocity was within the normal range. There was no evidence for  stenosis. There was no significant regurgitation. Pericardium:   There was no pericardial effusion. Aorta: Aortic root: The aortic root was normal. The aortic root was mildly dilated and 3.7cm diameter. The aortic root was dilated and 4.7cm diameter. Pulmonary arteries: Systolic pressure was moderately to markedly increased, in the range of 65mm Hg to 70mm Hg. Systemic veins:  Central venous respirophasic diameter changes are in the normal range (>50%). Inferior vena cava: The IVC was normal-sized. The IVC was normal-sized. ---------------------------------------------------------------------------- Measurements  Left ventricle       Value          Ref       08/18/2023  IVS thickness,   (H) 1.1   cm       0.6 - 0.9 0.9  ED, PLAX  LV ID, ED, PLAX      5.0   cm       3.8 - 5.2 4.9  LV ID, ES, PLAX  (H) 3.7   cm       2.2 - 3.5 3.7  LV PW thickness, (H) 1.0   cm       0.6 - 0.9 0.8  ED, PLAX  IVS/LV PW ratio,     1.18           --------- 1.15  ED, PLAX  LV PW/LV ID          0.19           --------- 0.17  ratio, ED, PLAX  LV ejection      (L) 50    %        54 - 74   47  fraction  Stroke               39    ml/m^2   --------- ----------  volume/bsa, 2D  LV e', medial    (L) 4.8   cm/sec   >=7.0     ----------  LVOT                 Value          Ref       08/18/2023  LVOT ID              2.3   cm       --------- 2.3  LVOT peak            0.82  m/sec    --------- 0.83  velocity, S  LVOT VTI, S          16.3  cm       --------- 15.0  LVOT mean            1     mm Hg    --------- 1  gradient, S  Stroke volume        68    ml       --------- 62  (SV), LVOT DP  Stroke index         39    ml/m^2   --------- 35  (SV/bsa), LVOT  DP  Aortic valve         Value          Ref       08/18/2023  Aortic valve         1.4   m/sec    --------- 1.19  peak velocity, S  Aortic valve         26.5  cm       --------- 21.6  VTI, S  Aortic mean          3     mm Hg    --------- 3  gradient, S  Aortic peak          8     mm Hg    --------- 6   gradient, S  Aortic valve         2.55  cm^2     --------- 2.88  area, VTI  Aortic valve         1.45  cm^2/m^2 --------- 1.61  area/bsa, VTI  Velocity ratio,      0.58           --------- 0.7  peak, LVOT/AV  Aortic root          Value          Ref       08/18/2023  Aortic root ID,  (H) 4.2   cm       2.5 - 4.0 4.6  ED  Left atrium          Value          Ref       08/18/2023  LA ID, A-P, ES   (H) 5.3   cm       2.7 - 3.8 5.5  LA volume, S     (H) 152   ml       22 - 52   ----------  LA volume/bsa, S (H) 86    ml/m^2   16 - 34   ----------  LA volume, ES,   (H) 155   ml       22 - 52   ----------  1-p A4C  LA volume, ES,   (H) 141   ml       22 - 52   128  1-p A2C  LA volume, ES,       157   ml       --------- ----------  A/L  LA volume/bsa,   (H) 89    ml/m^2   16 - 34   ----------  ES, A/L  LA/aortic root       1.26           --------- 1.2  ratio  Mitral valve         Value          Ref       08/18/2023  Mitral               151   ms       --------- 182  deceleration  time  Mitral pressure      55    ms       --------- ----------  half-time  Mitral mean          3     mm Hg    --------- 2  gradient, D  Mitral peak          10    mm Hg    --------- 8  gradient, D  Mitral valve         4.00  cm^2     --------- ----------  area, PHT, DP  Mitral valve         2.27  cm^2/m^2 --------- ----------  area/bsa, PHT,  DP  Mitral valve         2.12  cm^2     --------- ----------  area, LVOT  continuity  Mitral valve         1.21  cm^2/m^2 --------- ----------  area/bsa, LVOT  continuity  Mitral regurg        31.9  cm       --------- 29.1  VTI, PISA  Pulmonary artery     Value          Ref       08/18/2023  PA pressure, S,      66    mm Hg    --------- ----------  DP  PA pressure, ED,     20    mm Hg    --------- ----------  DP  Tricuspid valve      Value          Ref       08/18/2023  Tricuspid regurg (H) 3.75  m/sec    <=2.8     2.9  peak velocity  Tricuspid peak       58    mm Hg    --------- 36  RV-RA gradient  Systemic  veins       Value          Ref       08/18/2023  Estimated CVP        8     mm Hg    --------- 5  Right ventricle      Value          Ref       08/18/2023  RV pressure, S,      66    mm Hg    --------- 41  DP  Pulmonic valve       Value          Ref       08/18/2023  Pulmonic regurg      1.73  m/sec    --------- ----------  velocity, ED  Pulmonic regurg      12    mm Hg    --------- ----------  gradient, ED Legend: (L)  and  (H)  zane values outside specified reference range. ---------------------------------------------------------------------------- Prepared and electronically signed by Justice Alvarez MD 06/24/2024 16:32         Cath Angiogram Results (HF Patients only)    No exam resulted this encounter.       Physical Therapy Notes (last 72 hours)  Notes from 6/24/2024  1:16 PM through 6/27/2024  1:16 PM   No notes of this type exist for this encounter.     Occupational Therapy Notes (last 72 hours)  Notes from 6/24/2024  1:16 PM through 6/27/2024  1:16 PM   No notes of this type exist for this encounter.        Video Swallow Study Note - Video Swallow Study Notes      Video Swallow Study Note signed by Merari Velásquez at 6/25/2024  1:57 PM  Version 1 of 1    Author: Merari Velásquez Service: Rehab Author Type: SLP Student    Filed: 6/25/2024  1:57 PM Date of Service: 6/25/2024 12:57 PM Status: Signed    : Merari Velásquez (SLP Student) Cosigner: Daljit Carrillo SLP at 6/25/2024  2:07 PM       ADULT VIDEOFLUOROSCOPIC SWALLOWING STUDY    Admission Date: 6/23/2024  Evaluation Date: 06/25/24  Radiologist: Daksha MORTON   Diet Recommendations - Solids: Regular  Diet Recommendations - Liquids: Nectar thick liquids/ Mildly thick    Further Follow-up:  Follow Up Needed (Documentation Required): Yes  SLP Follow-up Date: 06/26/24  Compensatory Strategies Recommended: Liquids via spoon;Slow rate;No straws;Multiple swallows (Prolonged bolus hold)  Aspiration Precautions: Upright position  Medication  Administration Recommendations: Crushed in puree  Treatment Plan/Recommendations: Aspiration precautions;Dysphagia therapy    HISTORY   Background/Objective Information: Patient is a 89 y/o female admitted with dyspnea and PMHx significant for CKD, HTN, and CAD. See adult swallowing evaluation note from yesterday for full background history.     Problem List  Principal Problem:    Acute congestive heart failure, unspecified heart failure type (HCC)  Active Problems:    Vaginal bleeding    Iron deficiency anemia      Past Medical History  Past Medical History:    Arrhythmia    Afib    Coronary atherosclerosis    Hearing impairment    jeffrey. HA    High cholesterol    Hx of CABG    Incontinence    Pacemaker       Current Diet Consistency: Regular;Thin liquids  Prior Level of Function: Dependent  Prior Living Situation: Home with support  History of Recent: Difficulty breathing  Precautions: Aspiration  Imaging results:   CXR 6/23/24  CONCLUSION:       Findings are suggestive of fluid overload/CHF.        LOCATION:  [facility]                 Dictated by (CST): Emile Bello MD on 6/23/2024 at 2:13 PM      Finalized by (CST): Emile Bello MD on 6/23/2024 at 2:14 PM      Reason for Referral: R/O aspiration      Family/Patient Goals:  None stated.     ASSESSMENT   DYSPHAGIA ASSESSMENT  Test completed in conjunction with Radiologist.  Patient Positioned: Upright;Midline.  Patient Viewed: Lateral.  Patient Alertness: Fully alert.  Consistencies Presented: Thin liquids;Nectar thick liquids/ Mildly thick;Puree;Hard solid to assess oropharyngeal swallow function and assess for compensatory strategies to improve safe swallow function.    THIN LIQUIDS  Method of Presentation: Teaspoon;Cup  Oral Phase of Swallow (VFSS - Thin Liquids): Within Functional Limits  Triggered at: Pyriform sinuses  Residue Severity, Location: Throughout pharynx  Cleared/Reduced with: Multiple swallows  Laryngeal Penetration: Before the  swallow  Tracheal Aspiration: Before the swallow;After the swallow  Cough/Throat Clear Response:  (no reflexive cough. Pt cued to cough, however ineffective)  Cough/Throat Clear Effective: No  Strategy(ies) Implemented (Thin Liquids):  (Prolonged bolus hold)  Effectiveness: No  NECTAR THICK LIQUIDS/ MILDLY THICK  Method of Presentation: Teaspoon;Cup  Triggered at: Pyriform sinuses  Residue Severity, Location: Throughout pharynx  Cleared/Reduced with: Multiple swallows  Laryngeal Penetration: Before the swallow (flash)  Tracheal Aspiration: After the swallow  Cough/Throat Clear Response: No  Strategy(ies) Implemented (Nectar Thick): Liquids via spoon (Prolonged bolus hold)  Effectiveness: Yes     PUREE  Triggered at: Valleculae  Laryngeal Penetration: None  Tracheal Aspiration: None     HARD SOLID  Oral Phase of Swallow (VFSS - Hard Solid): Within Functional Limits  Triggered at: Base of tongue  Laryngeal Penetration: None  Tracheal Aspiration: None  Penetration Aspiration Scale Score: Score 8: Material enters the airway, passes below the vocal folds, and no effort is made to eject       Overall Impression: Patient presented with mild oropharyngeal dysphagia. Bolus acceptance was adequate without evidence of anterior bolus loss. Mastication and AP transit were thorough and efficient. Base of tongue retraction, epiglottic inversion, and hyolaryngeal excursion were all adequate in strength and amplitude. Pharyngeal swallow initiation was delayed, with initiation beginning at the level of the pyriform sinuses for both thin and mildly thick consistencies. Delayed pharyngeal swallow initiation resulted in silent aspiration before and after the swallow with thin liquids. Penetration occurred before the swallow with mildly thick liquids, resulting in silent aspiration after the swallow. Patient cued to cough, however was weak and ineffective. Cued prolonged bolus hold of mildly thick liquids via spoon was effective in  improving timing of pharyngeal swallow initiation and preventing aspiration before the swallow. This was not effective with thin liquids or with mildly thick liquids via cup sip. Adequate timing of pharyngeal swallow initiation noted for puree  and hard solid consistencies with no penetration or aspiration observed. Scattered pharyngeal residue observed for thin and mildly thick consistencies, which was cleared with reflexive multiple swallows.       Recommend initiating a regular diet and mildly thick liquids. Liquids should be presented via spoon only. Recommend patient practice prolonged oral bolus holding with mildly thick liquids via spoon to improve timing of pharyngeal swallow initiation. Patient should continue to initiate multiple swallows per bolus to clear pharyngeal residue. Continue to eat at a slow rate and in an upright position. Avoid straws. SLP to follow up to reinforce aspiration precautions and compensatory strategies, monitor current diet tolerance, and potentially advance diet.    GOALS  Goal #1 The patient will tolerate regular consistency and mildly thick liquids without overt signs or symptoms of aspiration with 95 % accuracy over 1-2 session(s).  In Progress   Goal #2 The patient/family/caregiver will demonstrate understanding and implementation of aspiration precautions and swallow strategies independently over 1-2 session(s).    In Progress   Goal #3 The patient will tolerate trial upgrade of regular consistency and thin liquids without overt signs or symptoms of aspiration with 95 % accuracy over 3-4 session(s).  In Progress   Goal #4 VFSS     Met                         EDUCATION/INSTRUCTION  Reviewed results and recommendations with patient/family/caregiver.  Agreement/Understanding verbalized and all questions answered to their apparent satisfaction.    INTERDISCIPLINARY COMMUNICATION  Reviewed results with Radiologist; agreement verbalized.    Patient Experiencing Pain: No                 FOLLOW UP  Treatment Plan/Recommendations: Aspiration precautions;Dysphagia therapy           Thank you for your referral.   If you have any questions, please contact Daljit Bills, SLP              SLP Note - SLP Notes      SLP Note signed by Merari Velásquez at 6/26/2024  1:46 PM  Version 1 of 1    Author: Merari Velásquez Service: Rehab Author Type: SLP Student    Filed: 6/26/2024  1:46 PM Date of Service: 6/26/2024 12:45 PM Status: Signed    : Merari Velásquez (SLP Student) Cosigner: Daljit Carrillo SLP at 6/26/2024  1:50 PM       SPEECH DAILY NOTE - INPATIENT    ASSESSMENT & PLAN   ASSESSMENT  Patient is a 89 female seen today to monitor current diet tolerance and teach compensatory strategies. Patient's  at bedside denied patient with any coughing or choking with modified diet. Discussed options for purchasing thickening agents following discharge from hospital. Education provided re: results of yesterday's VFSS and recommended compensatory strategies. Recommended that patient utilize a bolus hold when consuming mildly thick liquids via spoon to compensate for delayed pharyngeal swallow initiation.  Patient received alert in bed and tolerating RA. Patient utilized a bolus hold given moderate cues x6 trials of thin liquids via spoon. Cough x1 noted with initial trial. Pharyngeal swallow initiation appeared improved with use of bolus hold.    Recommend continued regular diet and mildly thick liquids. Liquids should be presented via spoon only and accompanied by a bolus hold. Patient should continue to initiate multiple reflexive swallows per bolus to clear pharyngeal residue. All PO intake should be consumed at a slow rate in an upright position. Avoid straws. Recommend patient and family seek home health speech therapy services for continued reinforcement of compensatory strategies and overall dysphagia management. SLP to follow up to reinforce aspiration precautions and compensatory  strategies and monitor current diet tolerance. Education provided re: results and recommendations.     Diet Recommendations - Solids: Regular  Diet Recommendations - Liquids: Nectar thick liquids/ Mildly thick    Compensatory Strategies Recommended: Liquids via spoon;Small bites and sips;Multiple swallows;No straws (Prolonged bolus hold)  Aspiration Precautions: Upright position  Medication Administration Recommendations: Crushed in puree    Patient Experiencing Pain: No                Treatment Plan  Treatment Plan/Recommendations: Aspiration precautions;Dysphagia therapy                G   GOALS  Goal #1 The patient will tolerate regular consistency and mildly thick liquids without overt signs or symptoms of aspiration with 95 % accuracy over 1-2 session(s).  In Progress   Goal #2 The patient/family/caregiver will demonstrate understanding and implementation of aspiration precautions and swallow strategies independently over 1-2 session(s).     In Progress   Goal #3 The patient will tolerate trial upgrade of regular consistency and thin liquids without overt signs or symptoms of aspiration with 95 % accuracy over 3-4 session(s).  In Progress   Goal #4 VFSS      Met                                     FOLLOW UP  Follow Up Needed (Documentation Required): Yes  SLP Follow-up Date: 06/27/24       Session: 1    If you have any questions, please contact Merari MERCEDES    Electronically signed by Daljit Carrillo, SLP on 6/26/2024  1:50 PM           Multidisciplinary Problems     Active Goals     Not on file          Resolved Goals        Problem: Patient/Family Goals    Goal Priority Disciplines Outcome Interventions   Patient/Family Long Term Goal   (Resolved)     Interdisciplinary Adequate for Discharge    Description: Patient's Long Term Goal: Stay out of hospital    Interventions:  - medication compliance  -follow up apts   - See additional Care Plan goals for specific interventions   Patient/Family Short Term Goal    (Resolved)     Interdisciplinary Adequate for Discharge    Description: Patient's Short Term Goal: go home     Interventions:   -video swallow test 6/25  - cardiology to see   - See additional Care Plan goals for specific interventions

## (undated) NOTE — IP AVS SNAPSHOT
[address]            (For Outpatient Use Only) Initial Admit Date: 6/10/2020   Inpt/Obs Admit Date: Inpt: 6/11/20 / Obs: N/A   Discharge Date:    Keiry Varner:  [de-identified]   MRN: [de-identified]   CSN: [id number]   CEID: [id number]

## (undated) NOTE — IP AVS SNAPSHOT
Patient Demographics     Address  [place]  [address] Phone  [phone] Health system  [phone] [facility]      Emergency Contact(s)     Name Relation Home Work Mobile    Skip ePterson Spouse [phone]        Allergies as of 10/5/2021 Evening As Needed   atorvastatin 20 MG Tabs  Commonly known as: LIPITOR  Next dose due: Tonight at bedtime      Take 1 tablet by mouth daily. Cholecalciferol 50 MCG (2000 UT) Tabs  Next dose due:  Tomorrow morning      Take 2,000 Int'l Units by patricia tablet 10/05/21 1131 Given      [id number] PEG 3350 (MIRALAX) powder packet 17 g 10/05/21 0550 Given      [id number] atorvastatin (LIPITOR) tab 20 mg 10/04/21 2025 Given      [id number] bisacodyl (DULCOLAX) rectal suppository 10 mg 10/05/21 1226 Given      [facility] 3SW-A Attending Matilde Mercer MD   Hosp Day # 0 PCP Cierra Vazquez DO     Chief Complaint: s/p fall    History of Present Illness: Franci Bravo is a 80year old female with past medical history significant for CAD with prev CABG, AAA, s/p biop docusate sodium 100 MG Oral Cap, Take 100 mg by mouth 2 (two) times daily. , Disp: , Rfl:   PEG 3350 Oral Powd Pack, Take 17 g by mouth daily as needed. , Disp: , Rfl:   rivaroxaban 15 MG Oral Tab, Take 15 mg by mouth daily with food. , Disp: , Rfl:   predn GFRAA 50*   GFRNAA 44*   CA 9.1   ALB 3.2*      K 3.4*      CO2 32.0   ALKPHO 80   AST 30   ALT 24   BILT 0.8   TP 7.0       Estimated Creatinine Clearance: 34.4 mL/min (A) (based on SCr of 1.12 mg/dL (H)).     Recent Labs   Lab 10/02/21  0740 Type: Physician    Filed: 10/3/2021  7:56 AM Date of Service: 10/3/2021  7:53 AM Status: Signed    : David Mcmahon MD (Physician)     Consult Orders    1.  IP consult to Orthopedic Surgery [[id number]] ordered by Heidi Smith MD at 10/02/21 1400 loading, can gently flex knee   Skin intact  Compartments soft  Motor intact from L2-S1 myotomes  Sensation intact from L2-S1 dermatomes  2+ DP pulses, 2 second cap refill    Pertinent Imaging:  Right hip and pelvis xrays     CONCLUSION:  Nondisplaced frac Problems:    Coronary artery disease involving native coronary artery of native heart without angina pectoris    Chronic atrial fibrillation (HCC)    Thrombocytopenia (HCC)    Hypokalemia    Azotemia    Hyperglycemia    Closed nondisplaced fracture of pelv standing up from a chair with arms (e.g., wheelchair, bedside commode, etc.): Unable   -   Moving from lying on back to sitting on the side of the bed?: Unable   How much help from another person does the patient currently need. ..   -   Moving to and from for  to see how much assist she is requiring. On 3rd attempt, still unable to achieve standing, with total assist/max assist of 2. Retracted armrest of b/s chair and max a of 2 to scoot pt over to b/s chair in squat pivot.  Pt then required max a level of impairment may benefit from DAVID. Pt will cont to benefit from skilled inpt pt to address the above problems and facilitate return to baseline.      DISCHARGE RECOMMENDATIONS  PT Discharge Recommendations: Sub-acute rehabilitation     PLAN  PT T replacement 9/29/21; h/o L hip fx s/p IM nailing 2020    Problem List  Principal Problem:    Closed nondisplaced fracture of pelvis, unspecified part of pelvis, initial encounter [facility])  Active Problems:    Coronary artery disease involving native coronary redirect  · Orientation Level:  oriented to person, disoriented to place, disoriented to time and disoriented to situation  · Memory:  impaired working memory, decreased recall of biographical information, decreased recall of precautions, decreased long te (including a wheelchair)?: A Lot   -   Need to walk in hospital room?: A Lot   -   Climbing 3-5 steps with a railing?: A Lot       AM-PAC Score:  Raw Score: 12   Approx Degree of Impairment: 68.66%   Standardized Score (AM-PAC Scale): 35.33   CMS Modifier Needs met; Call light within reach; RN aware of session/findings; All patient questions and concerns addressed; SCDs in place; Alarm set;  Family present    ASSESSMENT   Patient is a 80year old female admitted on 10/2/2021 for fall sustaining closed nondis established on 10/3/2021    PPE worn by PT: gloves, goggles, surgical mask  PPE worn by patient: surgical mask       Physical Therapy Note signed by Jace Harvey PT at 10/3/2021  4:53 PM  Version 1 of 2    Author: Jace Harvey, JEREMY Service: [facility]  Type of Home: Independent living facility ([place] [address])   Home Layout: One level                Lives With: Spouse; Caregiver part-time (caregiver 5 days/wk 7 AM-1PM)  Drives: No  Patient Owned Equipment: Rolling walker  Radha movements    Lower extremity strength is within functional limits L LE grossly 3+-4/5, R hip 3-, R knee ext 3+    BALANCE  Static Sitting: Fair (with B UE support)  Dynamic Sitting: Fair (with B UE support)  Static Standing: Fair - (with RW)  Dynamic Stand provided for hand placement/set-up & was able to tolerate standing with the RW x 1 min with B UE support then patient was returned to sitting on the bed min-mod assist.  Patient performed sit->stand again with the RW mod assist then took steps to the recli independent bed mobility, transfers, and gait. The patient is below baseline and would benefit from skilled inpatient PT to address the above deficits to assist patient in returning to prior to level of function.   DISCHARGE RECOMMENDATIONS  PT Discharge R xray negative for fracture or dislocation, findings consistent with rotator cuff impingement    Problem List  Principal Problem:    Closed nondisplaced fracture of pelvis, unspecified part of pelvis, initial encounter [facility])  Active Problems:    Coronary ar brushing teeth?: A Little  -   Eating meals?: A Little    AM-PAC Score:  Score: 12  Approx Degree of Impairment: 66.57%  Standardized Score (AM-PAC Scale): 30.6  CMS Modifier (G-Code): CL    FUNCTIONAL TRANSFER ASSESSMENT  Supine to Sit : Dependent assista Balance activities; Energy conservation/work simplification techniques; ADL training; Functional transfer training; UE strengthening/ROM;  Endurance training; Patient/Family education; Cognitive reorientation; Patient/Family training; Equipment eval/educati nondisplaced fracture of pelvis, unspecified part of pelvis, initial encounter [facility])  Active Problems:    Coronary artery disease involving native coronary artery of native heart without angina pectoris    Chronic atrial fibrillation ([facility] [address]    Thrombocytope Tolerated    PAIN ASSESSMENT  Rating: Unable to rate  Location: grimaces w/ movement to the right UE and LE  Management Techniques:  Activity promotion    COGNITION  Attention Span:  attends with cues to redirect  Orientation Level:  oriented to person, dis drying)?: A Lot  -   Toileting, which includes using toilet, bedpan or urinal? : A Lot  -   Putting on and taking off regular upper body clothing?: A Lot  -   Taking care of personal grooming such as brushing teeth?: A Little  -   Eating meals?: A Little daily living. Research supports that patients with this level of impairment often benefit from continued skilled OT intervention.    In this OT evaluation patient presents with the following performance deficits: decreased balance, endurance, strength, func Established Goals: 7    ADL Goals   Patient will perform grooming: with setup  Patient will perform upper body bathing:  with setup  Patient will perform lower body dressing:  with mod assist  Patient will perform toileting: with mod assist    Functional T